# Patient Record
Sex: FEMALE | Race: WHITE | Employment: OTHER | URBAN - METROPOLITAN AREA
[De-identification: names, ages, dates, MRNs, and addresses within clinical notes are randomized per-mention and may not be internally consistent; named-entity substitution may affect disease eponyms.]

---

## 2017-11-01 ENCOUNTER — HOSPITAL ENCOUNTER (OUTPATIENT)
Dept: OTHER | Age: 82
Discharge: HOME | End: 2017-11-30
Attending: INTERNAL MEDICINE | Admitting: INTERNAL MEDICINE

## 2017-12-01 ENCOUNTER — HOSPITAL ENCOUNTER (OUTPATIENT)
Dept: OTHER | Age: 82
Discharge: HOME | End: 2017-12-31
Attending: INTERNAL MEDICINE | Admitting: INTERNAL MEDICINE

## 2018-01-01 ENCOUNTER — HOSPITAL ENCOUNTER (OUTPATIENT)
Dept: OTHER | Age: 83
Discharge: HOME | End: 2018-01-31
Attending: INTERNAL MEDICINE | Admitting: INTERNAL MEDICINE

## 2018-02-01 ENCOUNTER — HOSPITAL ENCOUNTER (OUTPATIENT)
Dept: OTHER | Age: 83
Discharge: HOME | End: 2018-02-28
Attending: INTERNAL MEDICINE | Admitting: INTERNAL MEDICINE

## 2018-03-01 ENCOUNTER — HOSPITAL ENCOUNTER (OUTPATIENT)
Dept: OTHER | Age: 83
Discharge: HOME | End: 2018-03-31
Attending: INTERNAL MEDICINE | Admitting: INTERNAL MEDICINE

## 2018-04-01 ENCOUNTER — HOSPITAL ENCOUNTER (OUTPATIENT)
Dept: OTHER | Age: 83
Discharge: HOME | End: 2018-04-30
Attending: INTERNAL MEDICINE | Admitting: INTERNAL MEDICINE

## 2018-05-01 ENCOUNTER — HOSPITAL ENCOUNTER (OUTPATIENT)
Dept: OTHER | Age: 83
Discharge: HOME | End: 2018-05-31
Attending: INTERNAL MEDICINE | Admitting: INTERNAL MEDICINE

## 2018-06-01 ENCOUNTER — HOSPITAL ENCOUNTER (OUTPATIENT)
Dept: OTHER | Age: 83
Discharge: HOME | End: 2018-06-30
Attending: INTERNAL MEDICINE | Admitting: INTERNAL MEDICINE

## 2018-07-01 ENCOUNTER — HOSPITAL ENCOUNTER (OUTPATIENT)
Dept: OTHER | Age: 83
Discharge: HOME | End: 2018-07-01
Attending: INTERNAL MEDICINE | Admitting: INTERNAL MEDICINE

## 2018-08-08 ENCOUNTER — HOSPITAL ENCOUNTER (OUTPATIENT)
Age: 83
Discharge: HOME | End: 2018-08-08

## 2018-08-08 PROCEDURE — 9900000038 HC CARDIAC REHAB PHASE 3 - MONTHLY

## 2018-09-06 ENCOUNTER — HOSPITAL ENCOUNTER (OUTPATIENT)
Age: 83
Discharge: HOME | End: 2018-09-06

## 2018-09-06 PROCEDURE — 9900000038 HC CARDIAC REHAB PHASE 3 - MONTHLY

## 2018-10-03 ENCOUNTER — HOSPITAL ENCOUNTER (OUTPATIENT)
Age: 83
Discharge: HOME | End: 2018-10-03

## 2018-10-03 PROCEDURE — 9900000038 HC CARDIAC REHAB PHASE 3 - MONTHLY

## 2018-11-06 ENCOUNTER — HOSPITAL ENCOUNTER (OUTPATIENT)
Age: 83
Discharge: HOME | End: 2018-11-06

## 2018-11-06 PROCEDURE — 9900000038 HC CARDIAC REHAB PHASE 3 - MONTHLY

## 2018-12-05 ENCOUNTER — HOSPITAL ENCOUNTER (OUTPATIENT)
Age: 83
Discharge: HOME | End: 2018-12-05

## 2018-12-05 PROCEDURE — 9900000038 HC CARDIAC REHAB PHASE 3 - MONTHLY

## 2019-01-07 ENCOUNTER — HOSPITAL ENCOUNTER (OUTPATIENT)
Age: 84
Discharge: HOME | End: 2019-01-07

## 2019-01-07 PROCEDURE — 9900000038 HC CARDIAC REHAB PHASE 3 - MONTHLY

## 2019-02-06 ENCOUNTER — HOSPITAL ENCOUNTER (OUTPATIENT)
Age: 84
Discharge: HOME | End: 2019-02-06

## 2019-02-06 PROCEDURE — 9900000038 HC CARDIAC REHAB PHASE 3 - MONTHLY

## 2019-03-05 ENCOUNTER — HOSPITAL ENCOUNTER (OUTPATIENT)
Age: 84
Discharge: HOME | End: 2019-03-05

## 2019-03-05 PROCEDURE — 9900000038 HC CARDIAC REHAB PHASE 3 - MONTHLY

## 2019-04-03 ENCOUNTER — HOSPITAL ENCOUNTER (OUTPATIENT)
Age: 84
Discharge: HOME | End: 2019-04-03
Payer: COMMERCIAL

## 2019-04-03 PROCEDURE — 9900000038 HC CARDIAC REHAB PHASE 3 - MONTHLY

## 2019-05-02 ENCOUNTER — OFFICE VISIT (OUTPATIENT)
Dept: ORTHOPEDIC SURGERY | Age: 84
End: 2019-05-02
Payer: COMMERCIAL

## 2019-05-02 VITALS — HEIGHT: 64 IN | WEIGHT: 169.97 LBS | BODY MASS INDEX: 29.02 KG/M2

## 2019-05-02 DIAGNOSIS — M17.12 PRIMARY OSTEOARTHRITIS OF LEFT KNEE: ICD-10-CM

## 2019-05-02 DIAGNOSIS — M25.562 LEFT KNEE PAIN, UNSPECIFIED CHRONICITY: Primary | ICD-10-CM

## 2019-05-02 PROCEDURE — 99214 OFFICE O/P EST MOD 30 MIN: CPT | Performed by: ORTHOPAEDIC SURGERY

## 2019-05-02 RX ORDER — LEVOTHYROXINE SODIUM 0.05 MG/1
50 TABLET ORAL DAILY
Refills: 1 | Status: ON HOLD | COMMUNITY
Start: 2019-02-27 | End: 2021-01-01 | Stop reason: HOSPADM

## 2019-05-02 NOTE — PROGRESS NOTES
Juni Garza  [facility]#-[id number]  LOT#- [id number]  WYK:64/8899    4CC XYLOCAINE  VFW#-[id number]  OWX#[id number]  EXP: 10/2022    2CC DEPO  NDC#-[id number]  ZGI#[id number]  EXP: 01/2021    SITE: LEFT KNEE

## 2019-05-02 NOTE — PROGRESS NOTES
CHIEF COMPLAINT:    Chief Complaint   Patient presents with    Knee Pain     LEFT KNEE. PT STATES NO INJURY PAIN FOR YEARS. . NOW GETTING WORSE HARD TO WALK       HISTORY OF PRESENT ILLNESS:                The patient is a 81 y.o. female presents to clinic for evaluation of left knee pain. She states she's had pain in his knee for several years however, it has recently been worsening. She reports increased pain with activity and stair climbing. She points the anterior and medial aspects of the knee as the location of pain.     Does have stage IV kidney disease    Past Medical History:   Diagnosis Date    CAD (coronary artery disease)     MI    Hyperlipidemia     Hypertension     Kidney disease     stage 3 kidney disease    Peripheral vascular disease ([facility] [address]           The pain assessment was noted & is as follows:  Pain Assessment  Location of Pain: Knee  Location Modifiers: Left  Severity of Pain: 6  Quality of Pain: Aching, Dull, Sharp  Duration of Pain: Persistent  Frequency of Pain: Constant  Aggravating Factors: Bending, Stretching, Walking, Stairs  Limiting Behavior: Some  Relieving Factors: Rest  Result of Injury: No  Work-Related Injury: No  Are there other pain locations you wish to document?: No]      Work Status/Functionality:     Past Medical History: Medical history form was reviewed today & can be found in the media tab  Past Medical History:   Diagnosis Date    CAD (coronary artery disease)     MI    Hyperlipidemia     Hypertension     Kidney disease     stage 3 kidney disease    Peripheral vascular disease ([facility] [address]       Past Surgical History:     Past Surgical History:   Procedure Laterality Date    COLONOSCOPY  2/2012    CORONARY ANGIOPLASTY WITH STENT PLACEMENT      2009    ELBOW SURGERY      left    JOINT REPLACEMENT  8/30/11     right total knee replacement    TONSILLECTOMY       Current Medications:     Current Outpatient Medications:     levothyroxine (SYNTHROID) 50 MCG tablet, Visco supplementation with hyaluronate products. We talked about the typical course of this type of treatment and the fact that often times in the treatment for significant arthritis, this is successful less than half the time. We also talked about the corticosteroid injections and the fact that this can give a brief window of relief, but does not cure the problem; in fact, the pain often has a rebound effect in 6-10 weeks after the steroid has worn off. We also discussed arthroscopy surgery in attempts to debride the joint, but the fact that this is relatively unreliable treatment in the face of significant arthritis. It can occasionally be used, particularly if there is significant meniscus pathology. Lastly we discussed total joint replacement arthroplasty as the final and definitive step in treatment of arthritis. Patient realizes the magnitude of this type of treatment as well as having voiced a general understanding to the duration of the prosthesis. The patient voiced understanding to these continuum of treatment options. She was given a cortisone injection to the left knee today and will return to clinic for symptoms do not improve with this treatment. I discussed in detail the risks, benefits and complications of an injection which included but are not limited to infection, skin reactions, hot swollen joint, and anaphylaxis with the patient. The patient verbalized understanding and gave informed consent for the injection. The patient's knee was flexed to 90° and the skin prepped using sterile alcohol solution. A sterile 22-gauge needle was inserted into the knee and the mixture of 4 mL of 2% Carbocaine, 4 mL of 0.25% Marcaine, and 2mL of 40 mg of Depo-Medrol was injected under sterile technique. The needle was withdrawn and the puncture site sealed with a Band-Aid.      Technique: Under sterile conditions a [employer] ultrasound unit with a variable frequency (6.0-15.0 MHz) linear transducer was used

## 2019-05-10 ENCOUNTER — HOSPITAL ENCOUNTER (OUTPATIENT)
Age: 84
Discharge: HOME | End: 2019-05-10
Payer: COMMERCIAL

## 2019-05-10 PROCEDURE — 9900000038 HC CARDIAC REHAB PHASE 3 - MONTHLY

## 2019-05-23 ENCOUNTER — HOSPITAL ENCOUNTER (OUTPATIENT)
Dept: VASCULAR LAB | Age: 84
Discharge: HOME | End: 2019-05-23
Payer: COMMERCIAL

## 2019-05-23 PROCEDURE — 93922 UPR/L XTREMITY ART 2 LEVELS: CPT

## 2019-06-03 PROCEDURE — 9900000038 HC CARDIAC REHAB PHASE 3 - MONTHLY

## 2019-06-24 ENCOUNTER — HOSPITAL ENCOUNTER (OUTPATIENT)
Dept: CARDIAC REHAB | Age: 84
Discharge: HOME | End: 2019-06-24
Payer: COMMERCIAL

## 2019-07-12 PROCEDURE — 9900000038 HC CARDIAC REHAB PHASE 3 - MONTHLY

## 2019-07-29 ENCOUNTER — HOSPITAL ENCOUNTER (OUTPATIENT)
Dept: CARDIAC REHAB | Age: 84
Discharge: HOME | End: 2019-07-29
Payer: COMMERCIAL

## 2019-08-06 PROCEDURE — 9900000038 HC CARDIAC REHAB PHASE 3 - MONTHLY

## 2019-08-26 ENCOUNTER — HOSPITAL ENCOUNTER (OUTPATIENT)
Dept: CARDIAC REHAB | Age: 84
Discharge: HOME | End: 2019-08-26
Payer: COMMERCIAL

## 2019-09-11 PROCEDURE — 9900000038 HC CARDIAC REHAB PHASE 3 - MONTHLY

## 2019-09-23 ENCOUNTER — OFFICE VISIT (OUTPATIENT)
Dept: ORTHOPEDIC SURGERY | Age: 84
End: 2019-09-23
Payer: COMMERCIAL

## 2019-09-23 DIAGNOSIS — M25.562 LEFT KNEE PAIN, UNSPECIFIED CHRONICITY: Primary | ICD-10-CM

## 2019-09-23 RX ORDER — METHYLPREDNISOLONE ACETATE 40 MG/ML
80 INJECTION, SUSPENSION INTRA-ARTICULAR; INTRALESIONAL; INTRAMUSCULAR; SOFT TISSUE ONCE
Status: COMPLETED | OUTPATIENT
Start: 2019-09-23 | End: 2019-09-23

## 2019-09-23 RX ADMIN — METHYLPREDNISOLONE ACETATE 80 MG: 40 INJECTION, SUSPENSION INTRA-ARTICULAR; INTRALESIONAL; INTRAMUSCULAR; SOFT TISSUE at 10:18

## 2019-09-23 NOTE — PROGRESS NOTES
LEFT knee cortisone injection visit only  I discussed in detail the risks, benefits and complications of an injection which included but are not limited to infection, skin reactions, hot swollen joint, and anaphylaxis with the patient. The patient verbalized understanding and gave informed consent for the injection. The patient's knee was flexed to 90° and the skin prepped using sterile alcohol solution. A sterile 22-gauge needle was inserted into the knee and the mixture of 4 mL of 2% Carbocaine, 4 mL of 0.25% Marcaine, and 80 mg of Depo-Medrol was injected under sterile technique. The needle was withdrawn and the puncture site sealed with a Band-Aid. Technique: Under sterile conditions a [employer] ultrasound unit with a variable frequency (6.0-15.0 MHz) linear transducer was used to localize the placement of a 22-gauge needle into the LEFT knee joint. Findings: Successful needle placement for knee injection. Final images were taken and saved for permanent record. The patient tolerated the injection well. The patient was instructed to call the office immediately if there is any pain, redness, warmth, fever, or chills.

## 2019-09-30 ENCOUNTER — HOSPITAL ENCOUNTER (OUTPATIENT)
Dept: CARDIAC REHAB | Age: 84
Discharge: HOME | End: 2019-09-30
Payer: COMMERCIAL

## 2019-10-02 PROCEDURE — 9900000038 HC CARDIAC REHAB PHASE 3 - MONTHLY

## 2019-10-28 ENCOUNTER — HOSPITAL ENCOUNTER (OUTPATIENT)
Dept: CARDIAC REHAB | Age: 84
Discharge: HOME | End: 2019-10-28
Payer: COMMERCIAL

## 2019-11-05 PROCEDURE — 9900000038 HC CARDIAC REHAB PHASE 3 - MONTHLY

## 2019-11-25 ENCOUNTER — HOSPITAL ENCOUNTER (OUTPATIENT)
Dept: CARDIAC REHAB | Age: 84
Discharge: HOME | End: 2019-11-25
Payer: COMMERCIAL

## 2019-12-02 PROCEDURE — 9900000038 HC CARDIAC REHAB PHASE 3 - MONTHLY

## 2019-12-23 ENCOUNTER — OFFICE VISIT (OUTPATIENT)
Dept: ORTHOPEDIC SURGERY | Age: 84
End: 2019-12-23
Payer: COMMERCIAL

## 2019-12-23 DIAGNOSIS — M17.12 PRIMARY OSTEOARTHRITIS OF LEFT KNEE: Primary | ICD-10-CM

## 2019-12-23 RX ORDER — METHYLPREDNISOLONE ACETATE 40 MG/ML
80 INJECTION, SUSPENSION INTRA-ARTICULAR; INTRALESIONAL; INTRAMUSCULAR; SOFT TISSUE ONCE
Status: COMPLETED | OUTPATIENT
Start: 2019-12-23 | End: 2019-12-23

## 2019-12-23 RX ADMIN — METHYLPREDNISOLONE ACETATE 80 MG: 40 INJECTION, SUSPENSION INTRA-ARTICULAR; INTRALESIONAL; INTRAMUSCULAR; SOFT TISSUE at 10:23

## 2019-12-30 ENCOUNTER — HOSPITAL ENCOUNTER (OUTPATIENT)
Dept: CARDIAC REHAB | Age: 84
Discharge: HOME | End: 2019-12-30
Payer: COMMERCIAL

## 2020-01-08 PROCEDURE — 9900000038 HC CARDIAC REHAB PHASE 3 - MONTHLY

## 2020-01-27 ENCOUNTER — HOSPITAL ENCOUNTER (OUTPATIENT)
Dept: CARDIAC REHAB | Age: 85
Discharge: HOME | End: 2020-01-27
Payer: COMMERCIAL

## 2020-02-11 PROCEDURE — 9900000038 HC CARDIAC REHAB PHASE 3 - MONTHLY

## 2020-02-24 ENCOUNTER — HOSPITAL ENCOUNTER (OUTPATIENT)
Dept: CARDIAC REHAB | Age: 85
Discharge: HOME | End: 2020-02-24
Payer: COMMERCIAL

## 2020-03-04 ENCOUNTER — OFFICE VISIT (OUTPATIENT)
Dept: ORTHOPEDIC SURGERY | Age: 85
End: 2020-03-04
Payer: COMMERCIAL

## 2020-03-04 VITALS — HEIGHT: 64 IN | WEIGHT: 169.97 LBS | BODY MASS INDEX: 29.02 KG/M2

## 2020-03-04 PROBLEM — M70.41 PREPATELLAR BURSITIS OF RIGHT KNEE: Status: ACTIVE | Noted: 2020-03-04

## 2020-03-04 PROCEDURE — 99214 OFFICE O/P EST MOD 30 MIN: CPT | Performed by: ORTHOPAEDIC SURGERY

## 2020-03-04 RX ORDER — CEPHALEXIN 500 MG/1
500 CAPSULE ORAL 3 TIMES DAILY
Qty: 30 CAPSULE | Refills: 0 | Status: SHIPPED | OUTPATIENT
Start: 2020-03-04 | End: 2020-03-14

## 2020-03-04 RX ORDER — SULFAMETHOXAZOLE AND TRIMETHOPRIM 800; 160 MG/1; MG/1
1 TABLET ORAL 2 TIMES DAILY
Qty: 20 TABLET | Refills: 0 | Status: CANCELLED | OUTPATIENT
Start: 2020-03-04 | End: 2020-03-14

## 2020-03-04 NOTE — PROGRESS NOTES
SURGERY      left    JOINT REPLACEMENT  8/30/11     right total knee replacement    TONSILLECTOMY       Current Medications:     Current Outpatient Medications:     cephALEXin (KEFLEX) 500 MG capsule, Take 1 capsule by mouth 3 times daily for 10 days, Disp: 30 capsule, Rfl: 0    levothyroxine (SYNTHROID) 50 MCG tablet, TK 1 T PO D, Disp: , Rfl: 1    therapeutic multivitamin-minerals (THERAGRAN-M) tablet, Take 1 tablet by mouth daily. , Disp: , Rfl:     fenofibrate (TRIGLIDE) 160 MG tablet, Take 160 mg by mouth daily. , Disp: , Rfl:     amlodipine (NORVASC) 5 MG tablet, Take 5 mg by mouth 2 times daily. , Disp: , Rfl:     metoprolol (LOPRESSOR) 25 MG tablet, Take 25 mg by mouth 2 times daily. , Disp: , Rfl:     lisinopril-hydrochlorothiazide (PRINZIDE;ZESTORETIC) 20-12.5 MG per tablet, Take 1 tablet by mouth daily. , Disp: , Rfl:     atorvastatin (LIPITOR) 40 MG tablet, Take 40 mg by mouth nightly.  , Disp: , Rfl:     aspirin 81 MG chewable tablet, Take 81 mg by mouth daily. , Disp: , Rfl:     Cholecalciferol (VITAMIN D3) 2000 UNIT CAPS, Take 2,000 Units by mouth daily. , Disp: , Rfl:   Allergies:  Patient has no known allergies. Social History:    reports that she has never smoked. She has never used smokeless tobacco. She reports current alcohol use. She reports that she does not use drugs. Family History:   Family History   Problem Relation Age of Onset    Heart Disease Mother     Cancer Mother         ovarian    Cancer Father         lulu       REVIEW OF SYSTEMS:   For new problems, a full review of systems will be found scanned in the patient's chart. CONSTITUTIONAL: Denies unexplained weight loss, fevers, chills   NEUROLOGICAL: Denies unsteady gait or progressive weakness  SKIN: Denies skin changes, delayed healing, rash, itching       PHYSICAL EXAM:    Vitals: Height 5' 4.02\" (1.626 m), weight 169 lb 15.6 oz (77.1 kg).     GENERAL EXAM:  · General Apparence: Patient is adequately

## 2020-03-04 NOTE — PATIENT INSTRUCTIONS
chair, counter, or wall. 2. Standing on the leg with your affected ankle, keep that knee straight. Try to balance on that leg for up to 30 seconds. Then rest for up to 10 seconds. 3. Repeat 6 to 8 times. 4. When you can balance on your affected leg for 30 seconds with your eyes open, try to balance on it with your eyes closed. 5. When you can do this exercise with your eyes closed for 30 seconds and with ease and no pain, try standing on a pillow or piece of foam, and repeat steps 1 through 4. Follow-up care is a key part of your treatment and safety. Be sure to make and go to all appointments, and call your doctor if you are having problems. It's also a good idea to know your test results and keep a list of the medicines you take. Where can you learn more? Go to [email or web address] and sign in to your [employer] account. Enter Amarilys Heath in the [facility] box to learn more about \"Ankle Sprain: Rehab Exercises. \"     If you do not have an account, please click on the \"Sign Up Now\" link. Current as of: June 26, 2019  Content Version: 12.3  © 2096-3401 [employer]. Care instructions adapted under license by [facility] ([facility]). If you have questions about a medical condition or this instruction, always ask your healthcare professional. [place] [address] any warranty or liability for your use of this information.

## 2020-05-11 ENCOUNTER — NURSE ONLY (OUTPATIENT)
Dept: ORTHOPEDIC SURGERY | Age: 85
End: 2020-05-11
Payer: COMMERCIAL

## 2020-05-11 RX ORDER — BUPIVACAINE HYDROCHLORIDE 2.5 MG/ML
30 INJECTION, SOLUTION INFILTRATION; PERINEURAL ONCE
Status: COMPLETED | OUTPATIENT
Start: 2020-05-11 | End: 2020-05-11

## 2020-05-11 RX ORDER — METHYLPREDNISOLONE ACETATE 40 MG/ML
80 INJECTION, SUSPENSION INTRA-ARTICULAR; INTRALESIONAL; INTRAMUSCULAR; SOFT TISSUE ONCE
Status: COMPLETED | OUTPATIENT
Start: 2020-05-11 | End: 2020-05-11

## 2020-05-11 RX ORDER — LIDOCAINE HYDROCHLORIDE 10 MG/ML
20 INJECTION, SOLUTION INFILTRATION; PERINEURAL ONCE
Status: COMPLETED | OUTPATIENT
Start: 2020-05-11 | End: 2020-05-11

## 2020-05-11 RX ADMIN — LIDOCAINE HYDROCHLORIDE 20 ML: 10 INJECTION, SOLUTION INFILTRATION; PERINEURAL at 10:09

## 2020-05-11 RX ADMIN — METHYLPREDNISOLONE ACETATE 80 MG: 40 INJECTION, SUSPENSION INTRA-ARTICULAR; INTRALESIONAL; INTRAMUSCULAR; SOFT TISSUE at 10:09

## 2020-05-11 RX ADMIN — BUPIVACAINE HYDROCHLORIDE 75 MG: 2.5 INJECTION, SOLUTION INFILTRATION; PERINEURAL at 10:09

## 2021-01-01 ENCOUNTER — ANESTHESIA (OUTPATIENT)
Dept: OPERATING ROOM | Age: 86
DRG: 470 | End: 2021-01-01
Payer: MEDICARE

## 2021-01-01 ENCOUNTER — CARE COORDINATION (OUTPATIENT)
Dept: CASE MANAGEMENT | Age: 86
End: 2021-01-01

## 2021-01-01 ENCOUNTER — HOSPITAL ENCOUNTER (OUTPATIENT)
Age: 86
Discharge: HOME | End: 2021-06-24
Payer: COMMERCIAL

## 2021-01-01 ENCOUNTER — HOSPITAL ENCOUNTER (INPATIENT)
Age: 86
LOS: 2 days | Discharge: NURSING, REHAB OR LONG-TERM CARE | DRG: 470 | End: 2021-07-16
Attending: ORTHOPAEDIC SURGERY | Admitting: ORTHOPAEDIC SURGERY
Payer: MEDICARE

## 2021-01-01 ENCOUNTER — HOSPITAL ENCOUNTER (OUTPATIENT)
Age: 86
Setting detail: OBSERVATION
Discharge: ANOTHER HOSPITAL | End: 2021-08-17
Attending: EMERGENCY MEDICINE | Admitting: INTERNAL MEDICINE
Payer: COMMERCIAL

## 2021-01-01 ENCOUNTER — OFFICE VISIT (OUTPATIENT)
Dept: ORTHOPEDIC SURGERY | Age: 86
End: 2021-01-01
Payer: COMMERCIAL

## 2021-01-01 ENCOUNTER — HOSPITAL ENCOUNTER (OUTPATIENT)
Dept: CT IMAGING | Age: 86
Discharge: HOME | DRG: 470 | End: 2021-07-07
Payer: MEDICARE

## 2021-01-01 ENCOUNTER — APPOINTMENT (OUTPATIENT)
Dept: CT IMAGING | Age: 86
DRG: 871 | End: 2021-01-01
Payer: MEDICARE

## 2021-01-01 ENCOUNTER — TELEPHONE (OUTPATIENT)
Dept: ORTHOPEDIC SURGERY | Age: 86
End: 2021-01-01

## 2021-01-01 ENCOUNTER — HOSPITAL ENCOUNTER (INPATIENT)
Age: 86
LOS: 4 days | DRG: 871 | End: 2021-10-15
Attending: EMERGENCY MEDICINE | Admitting: INTERNAL MEDICINE
Payer: MEDICARE

## 2021-01-01 ENCOUNTER — APPOINTMENT (OUTPATIENT)
Dept: GENERAL RADIOLOGY | Age: 86
DRG: 470 | End: 2021-01-01
Attending: ORTHOPAEDIC SURGERY
Payer: MEDICARE

## 2021-01-01 ENCOUNTER — HOSPITAL ENCOUNTER (OUTPATIENT)
Dept: CT IMAGING | Age: 86
Discharge: HOME | End: 2021-06-04
Payer: COMMERCIAL

## 2021-01-01 ENCOUNTER — OFFICE VISIT (OUTPATIENT)
Dept: ORTHOPEDIC SURGERY | Age: 86
End: 2021-01-01

## 2021-01-01 ENCOUNTER — TELEPHONE (OUTPATIENT)
Dept: OBGYN CLINIC | Age: 86
End: 2021-01-01

## 2021-01-01 ENCOUNTER — APPOINTMENT (OUTPATIENT)
Dept: GENERAL RADIOLOGY | Age: 86
DRG: 871 | End: 2021-01-01
Payer: MEDICARE

## 2021-01-01 ENCOUNTER — ANESTHESIA EVENT (OUTPATIENT)
Dept: OPERATING ROOM | Age: 86
DRG: 470 | End: 2021-01-01
Payer: MEDICARE

## 2021-01-01 VITALS
RESPIRATION RATE: 16 BRPM | WEIGHT: 169 LBS | HEIGHT: 64 IN | OXYGEN SATURATION: 94 % | SYSTOLIC BLOOD PRESSURE: 164 MMHG | BODY MASS INDEX: 28.85 KG/M2 | DIASTOLIC BLOOD PRESSURE: 76 MMHG | TEMPERATURE: 98.3 F | HEART RATE: 91 BPM

## 2021-01-01 VITALS
RESPIRATION RATE: 16 BRPM | SYSTOLIC BLOOD PRESSURE: 106 MMHG | DIASTOLIC BLOOD PRESSURE: 54 MMHG | OXYGEN SATURATION: 100 %

## 2021-01-01 VITALS — BODY MASS INDEX: 28.85 KG/M2 | HEIGHT: 64 IN | WEIGHT: 169 LBS

## 2021-01-01 VITALS
HEIGHT: 64 IN | HEART RATE: 77 BPM | SYSTOLIC BLOOD PRESSURE: 152 MMHG | TEMPERATURE: 97.7 F | WEIGHT: 169 LBS | DIASTOLIC BLOOD PRESSURE: 69 MMHG | OXYGEN SATURATION: 97 % | RESPIRATION RATE: 19 BRPM | BODY MASS INDEX: 28.85 KG/M2

## 2021-01-01 VITALS — HEIGHT: 64 IN | WEIGHT: 169 LBS | BODY MASS INDEX: 28.85 KG/M2

## 2021-01-01 VITALS
RESPIRATION RATE: 16 BRPM | BODY MASS INDEX: 23.94 KG/M2 | WEIGHT: 140.21 LBS | HEART RATE: 97 BPM | SYSTOLIC BLOOD PRESSURE: 109 MMHG | HEIGHT: 64 IN | TEMPERATURE: 97.9 F | DIASTOLIC BLOOD PRESSURE: 74 MMHG | OXYGEN SATURATION: 94 %

## 2021-01-01 DIAGNOSIS — I21.4 NSTEMI (NON-ST ELEVATED MYOCARDIAL INFARCTION) (HCC): Primary | ICD-10-CM

## 2021-01-01 DIAGNOSIS — N18.30 STAGE 3 CHRONIC KIDNEY DISEASE, UNSPECIFIED WHETHER STAGE 3A OR 3B CKD (HCC): ICD-10-CM

## 2021-01-01 DIAGNOSIS — R19.07 GENERALIZED ABDOMINAL MASS: ICD-10-CM

## 2021-01-01 DIAGNOSIS — E11.00 HYPEROSMOLAR HYPERGLYCEMIC STATE (HHS) (HCC): ICD-10-CM

## 2021-01-01 DIAGNOSIS — R33.9 URINARY RETENTION: ICD-10-CM

## 2021-01-01 DIAGNOSIS — M25.562 LEFT KNEE PAIN, UNSPECIFIED CHRONICITY: ICD-10-CM

## 2021-01-01 DIAGNOSIS — Z96.652 STATUS POST TOTAL KNEE REPLACEMENT, LEFT: ICD-10-CM

## 2021-01-01 DIAGNOSIS — E87.5 HYPERKALEMIA: ICD-10-CM

## 2021-01-01 DIAGNOSIS — R19.00 ABDOMINAL MASS, UNSPECIFIED ABDOMINAL LOCATION: ICD-10-CM

## 2021-01-01 DIAGNOSIS — J96.01 ACUTE RESPIRATORY FAILURE WITH HYPOXIA (HCC): ICD-10-CM

## 2021-01-01 DIAGNOSIS — M17.12 LOCALIZED OSTEOARTHRITIS OF LEFT KNEE: Primary | ICD-10-CM

## 2021-01-01 DIAGNOSIS — A41.9 SEPSIS, DUE TO UNSPECIFIED ORGANISM, UNSPECIFIED WHETHER ACUTE ORGAN DYSFUNCTION PRESENT (HCC): ICD-10-CM

## 2021-01-01 DIAGNOSIS — Z96.652 S/P TKR (TOTAL KNEE REPLACEMENT), LEFT: Primary | ICD-10-CM

## 2021-01-01 DIAGNOSIS — M17.12 LOCALIZED OSTEOARTHRITIS OF LEFT KNEE: ICD-10-CM

## 2021-01-01 DIAGNOSIS — Z91.81 AT HIGH RISK FOR INJURY RELATED TO FALL: ICD-10-CM

## 2021-01-01 DIAGNOSIS — T83.021A DISPLACEMENT OF FOLEY CATHETER, INITIAL ENCOUNTER (HCC): Primary | ICD-10-CM

## 2021-01-01 DIAGNOSIS — M25.562 LEFT KNEE PAIN, UNSPECIFIED CHRONICITY: Primary | ICD-10-CM

## 2021-01-01 DIAGNOSIS — M24.662 ARTHROFIBROSIS OF KNEE JOINT, LEFT: ICD-10-CM

## 2021-01-01 DIAGNOSIS — Z95.5 HISTORY OF HEART ARTERY STENT: ICD-10-CM

## 2021-01-01 DIAGNOSIS — J18.9 MULTIFOCAL PNEUMONIA: ICD-10-CM

## 2021-01-01 LAB
A/G RATIO: 0.6 (ref 1.1–2.2)
A/G RATIO: 0.7 (ref 1.1–2.2)
A/G RATIO: 1.1 (ref 1.1–2.2)
ABO/RH: NORMAL
ABO/RH: NORMAL
ALBUMIN SERPL-MCNC: 1.8 G/DL (ref 3.4–5)
ALBUMIN SERPL-MCNC: 1.8 G/DL (ref 3.4–5)
ALBUMIN SERPL-MCNC: 2.1 G/DL (ref 3.4–5)
ALBUMIN SERPL-MCNC: 2.3 G/DL (ref 3.4–5)
ALBUMIN SERPL-MCNC: 3.4 G/DL (ref 3.4–5)
ALBUMIN SERPL-MCNC: 4 G/DL (ref 3.4–5)
ALP BLD-CCNC: 70 U/L (ref 40–129)
ALP BLD-CCNC: 78 U/L (ref 40–129)
ALP BLD-CCNC: 80 U/L (ref 40–129)
ALP BLD-CCNC: 94 U/L (ref 40–129)
ALP BLD-CCNC: 98 U/L (ref 40–129)
ALT SERPL-CCNC: 118 U/L (ref 10–40)
ALT SERPL-CCNC: 130 U/L (ref 10–40)
ALT SERPL-CCNC: 17 U/L (ref 10–40)
ALT SERPL-CCNC: 171 U/L (ref 10–40)
ALT SERPL-CCNC: 174 U/L (ref 10–40)
ANION GAP SERPL CALCULATED.3IONS-SCNC: 10 MMOL/L (ref 3–16)
ANION GAP SERPL CALCULATED.3IONS-SCNC: 11 MMOL/L (ref 3–16)
ANION GAP SERPL CALCULATED.3IONS-SCNC: 11 MMOL/L (ref 3–16)
ANION GAP SERPL CALCULATED.3IONS-SCNC: 12 MMOL/L (ref 3–16)
ANION GAP SERPL CALCULATED.3IONS-SCNC: 12 MMOL/L (ref 3–16)
ANION GAP SERPL CALCULATED.3IONS-SCNC: 13 MMOL/L (ref 3–16)
ANION GAP SERPL CALCULATED.3IONS-SCNC: 13 MMOL/L (ref 3–16)
ANION GAP SERPL CALCULATED.3IONS-SCNC: 7 MMOL/L (ref 3–16)
ANION GAP SERPL CALCULATED.3IONS-SCNC: 8 MMOL/L (ref 3–16)
ANION GAP SERPL CALCULATED.3IONS-SCNC: 9 MMOL/L (ref 3–16)
ANTIBODY SCREEN: NORMAL
ANTIBODY SCREEN: NORMAL
APTT: 31.9 SEC (ref 24.2–36.2)
AST SERPL-CCNC: 182 U/L (ref 15–37)
AST SERPL-CCNC: 192 U/L (ref 15–37)
AST SERPL-CCNC: 33 U/L (ref 15–37)
AST SERPL-CCNC: 89 U/L (ref 15–37)
AST SERPL-CCNC: 96 U/L (ref 15–37)
BACTERIA: ABNORMAL /HPF
BACTERIA: ABNORMAL /HPF
BASE EXCESS VENOUS: -0.5 MMOL/L (ref -3–3)
BASOPHILS ABSOLUTE: 0 K/UL (ref 0–0.2)
BASOPHILS ABSOLUTE: 0.1 K/UL (ref 0–0.2)
BASOPHILS ABSOLUTE: 0.1 K/UL (ref 0–0.2)
BASOPHILS RELATIVE PERCENT: 0.1 %
BASOPHILS RELATIVE PERCENT: 0.2 %
BASOPHILS RELATIVE PERCENT: 0.3 %
BASOPHILS RELATIVE PERCENT: 0.6 %
BASOPHILS RELATIVE PERCENT: 0.7 %
BASOPHILS RELATIVE PERCENT: 1.1 %
BILIRUB SERPL-MCNC: 0.7 MG/DL (ref 0–1)
BILIRUB SERPL-MCNC: 0.8 MG/DL (ref 0–1)
BILIRUB SERPL-MCNC: 0.8 MG/DL (ref 0–1)
BILIRUB SERPL-MCNC: 0.9 MG/DL (ref 0–1)
BILIRUB SERPL-MCNC: 1 MG/DL (ref 0–1)
BILIRUBIN URINE: NEGATIVE
BLOOD BANK DISPENSE STATUS: NORMAL
BLOOD BANK PRODUCT CODE: NORMAL
BLOOD CULTURE, ROUTINE: ABNORMAL
BLOOD, URINE: ABNORMAL
BLOOD, URINE: NEGATIVE
BLOOD, URINE: NEGATIVE
BPU ID: NORMAL
BUN BLDV-MCNC: 109 MG/DL (ref 7–20)
BUN BLDV-MCNC: 112 MG/DL (ref 7–20)
BUN BLDV-MCNC: 117 MG/DL (ref 7–20)
BUN BLDV-MCNC: 118 MG/DL (ref 7–20)
BUN BLDV-MCNC: 118 MG/DL (ref 7–20)
BUN BLDV-MCNC: 119 MG/DL (ref 7–20)
BUN BLDV-MCNC: 121 MG/DL (ref 7–20)
BUN BLDV-MCNC: 124 MG/DL (ref 7–20)
BUN BLDV-MCNC: 129 MG/DL (ref 7–20)
BUN BLDV-MCNC: 129 MG/DL (ref 7–20)
BUN BLDV-MCNC: 42 MG/DL (ref 7–20)
BUN BLDV-MCNC: 42 MG/DL (ref 7–20)
BUN BLDV-MCNC: 44 MG/DL (ref 7–20)
BUN BLDV-MCNC: 50 MG/DL (ref 7–20)
BUN BLDV-MCNC: 54 MG/DL (ref 7–20)
BUN BLDV-MCNC: 56 MG/DL (ref 7–20)
BUN BLDV-MCNC: 57 MG/DL (ref 7–20)
BUN BLDV-MCNC: 60 MG/DL (ref 7–20)
CA 125: 256.2 U/ML (ref 0–35)
CA 19-9: 23 U/ML (ref 0–35)
CALCIUM SERPL-MCNC: 10.3 MG/DL (ref 8.3–10.6)
CALCIUM SERPL-MCNC: 10.4 MG/DL (ref 8.3–10.6)
CALCIUM SERPL-MCNC: 8.6 MG/DL (ref 8.3–10.6)
CALCIUM SERPL-MCNC: 8.6 MG/DL (ref 8.3–10.6)
CALCIUM SERPL-MCNC: 8.7 MG/DL (ref 8.3–10.6)
CALCIUM SERPL-MCNC: 8.7 MG/DL (ref 8.3–10.6)
CALCIUM SERPL-MCNC: 8.8 MG/DL (ref 8.3–10.6)
CALCIUM SERPL-MCNC: 8.9 MG/DL (ref 8.3–10.6)
CALCIUM SERPL-MCNC: 8.9 MG/DL (ref 8.3–10.6)
CALCIUM SERPL-MCNC: 9 MG/DL (ref 8.3–10.6)
CALCIUM SERPL-MCNC: 9.1 MG/DL (ref 8.3–10.6)
CALCIUM SERPL-MCNC: 9.2 MG/DL (ref 8.3–10.6)
CALCIUM SERPL-MCNC: 9.5 MG/DL (ref 8.3–10.6)
CALCIUM SERPL-MCNC: 9.9 MG/DL (ref 8.3–10.6)
CARBOXYHEMOGLOBIN: 2.4 % (ref 0–1.5)
CEA: 3.8 NG/ML (ref 0–5)
CHLORIDE BLD-SCNC: 101 MMOL/L (ref 99–110)
CHLORIDE BLD-SCNC: 102 MMOL/L (ref 99–110)
CHLORIDE BLD-SCNC: 102 MMOL/L (ref 99–110)
CHLORIDE BLD-SCNC: 103 MMOL/L (ref 99–110)
CHLORIDE BLD-SCNC: 104 MMOL/L (ref 99–110)
CHLORIDE BLD-SCNC: 107 MMOL/L (ref 99–110)
CHLORIDE BLD-SCNC: 88 MMOL/L (ref 99–110)
CHLORIDE BLD-SCNC: 90 MMOL/L (ref 99–110)
CHLORIDE BLD-SCNC: 93 MMOL/L (ref 99–110)
CHLORIDE BLD-SCNC: 94 MMOL/L (ref 99–110)
CHLORIDE BLD-SCNC: 94 MMOL/L (ref 99–110)
CHLORIDE BLD-SCNC: 95 MMOL/L (ref 99–110)
CHLORIDE BLD-SCNC: 96 MMOL/L (ref 99–110)
CHLORIDE BLD-SCNC: 97 MMOL/L (ref 99–110)
CLARITY: CLEAR
CO2: 19 MMOL/L (ref 21–32)
CO2: 21 MMOL/L (ref 21–32)
CO2: 22 MMOL/L (ref 21–32)
CO2: 23 MMOL/L (ref 21–32)
CO2: 24 MMOL/L (ref 21–32)
CO2: 24 MMOL/L (ref 21–32)
CO2: 25 MMOL/L (ref 21–32)
CO2: 25 MMOL/L (ref 21–32)
COLOR: YELLOW
CREAT SERPL-MCNC: 1 MG/DL (ref 0.6–1.2)
CREAT SERPL-MCNC: 1.1 MG/DL (ref 0.6–1.2)
CREAT SERPL-MCNC: 1.1 MG/DL (ref 0.6–1.2)
CREAT SERPL-MCNC: 1.2 MG/DL (ref 0.6–1.2)
CREAT SERPL-MCNC: 1.3 MG/DL (ref 0.6–1.2)
CREAT SERPL-MCNC: 1.4 MG/DL (ref 0.6–1.2)
CREAT SERPL-MCNC: 1.5 MG/DL (ref 0.6–1.2)
CREAT SERPL-MCNC: 1.6 MG/DL (ref 0.6–1.2)
CREAT SERPL-MCNC: 1.6 MG/DL (ref 0.6–1.2)
CREAT SERPL-MCNC: 1.8 MG/DL (ref 0.6–1.2)
CREAT SERPL-MCNC: 1.8 MG/DL (ref 0.6–1.2)
CULTURE, BLOOD 2: ABNORMAL
CULTURE, BLOOD 2: ABNORMAL
DESCRIPTION BLOOD BANK: NORMAL
EKG ATRIAL RATE: 78 BPM
EKG ATRIAL RATE: 92 BPM
EKG ATRIAL RATE: 93 BPM
EKG DIAGNOSIS: NORMAL
EKG Q-T INTERVAL: 352 MS
EKG Q-T INTERVAL: 360 MS
EKG Q-T INTERVAL: 362 MS
EKG QRS DURATION: 114 MS
EKG QRS DURATION: 122 MS
EKG QRS DURATION: 92 MS
EKG QTC CALCULATION (BAZETT): 435 MS
EKG QTC CALCULATION (BAZETT): 452 MS
EKG QTC CALCULATION (BAZETT): 464 MS
EKG R AXIS: 36 DEGREES
EKG R AXIS: 54 DEGREES
EKG R AXIS: 71 DEGREES
EKG T AXIS: -87 DEGREES
EKG T AXIS: 261 DEGREES
EKG T AXIS: 87 DEGREES
EKG VENTRICULAR RATE: 100 BPM
EKG VENTRICULAR RATE: 92 BPM
EKG VENTRICULAR RATE: 94 BPM
EOSINOPHILS ABSOLUTE: 0 K/UL (ref 0–0.6)
EOSINOPHILS ABSOLUTE: 0.1 K/UL (ref 0–0.6)
EOSINOPHILS ABSOLUTE: 0.2 K/UL (ref 0–0.6)
EOSINOPHILS ABSOLUTE: 0.2 K/UL (ref 0–0.6)
EOSINOPHILS RELATIVE PERCENT: 0 %
EOSINOPHILS RELATIVE PERCENT: 0.1 %
EOSINOPHILS RELATIVE PERCENT: 0.1 %
EOSINOPHILS RELATIVE PERCENT: 0.3 %
EOSINOPHILS RELATIVE PERCENT: 0.6 %
EOSINOPHILS RELATIVE PERCENT: 0.9 %
EOSINOPHILS RELATIVE PERCENT: 1 %
EOSINOPHILS RELATIVE PERCENT: 2.3 %
EOSINOPHILS RELATIVE PERCENT: 2.7 %
EPITHELIAL CELLS, UA: ABNORMAL /HPF (ref 0–5)
ESTIMATED AVERAGE GLUCOSE: 119.8 MG/DL
ESTIMATED AVERAGE GLUCOSE: 194.4 MG/DL
ESTIMATED AVERAGE GLUCOSE: 200.1 MG/DL
GFR AFRICAN AMERICAN: 32
GFR AFRICAN AMERICAN: 32
GFR AFRICAN AMERICAN: 37
GFR AFRICAN AMERICAN: 37
GFR AFRICAN AMERICAN: 40
GFR AFRICAN AMERICAN: 43
GFR AFRICAN AMERICAN: 47
GFR AFRICAN AMERICAN: 52
GFR AFRICAN AMERICAN: 57
GFR AFRICAN AMERICAN: 57
GFR AFRICAN AMERICAN: >60
GFR NON-AFRICAN AMERICAN: 27
GFR NON-AFRICAN AMERICAN: 27
GFR NON-AFRICAN AMERICAN: 31
GFR NON-AFRICAN AMERICAN: 31
GFR NON-AFRICAN AMERICAN: 33
GFR NON-AFRICAN AMERICAN: 36
GFR NON-AFRICAN AMERICAN: 39
GFR NON-AFRICAN AMERICAN: 43
GFR NON-AFRICAN AMERICAN: 47
GFR NON-AFRICAN AMERICAN: 47
GFR NON-AFRICAN AMERICAN: 53
GLOBULIN: 3.1 G/DL
GLOBULIN: 3.2 G/DL
GLOBULIN: 3.2 G/DL
GLOBULIN: 3.4 G/DL
GLOBULIN: 3.4 G/DL
GLUCOSE BLD-MCNC: 105 MG/DL (ref 70–99)
GLUCOSE BLD-MCNC: 109 MG/DL (ref 70–99)
GLUCOSE BLD-MCNC: 110 MG/DL (ref 70–99)
GLUCOSE BLD-MCNC: 113 MG/DL (ref 70–99)
GLUCOSE BLD-MCNC: 120 MG/DL (ref 70–99)
GLUCOSE BLD-MCNC: 121 MG/DL (ref 70–99)
GLUCOSE BLD-MCNC: 124 MG/DL (ref 70–99)
GLUCOSE BLD-MCNC: 124 MG/DL (ref 70–99)
GLUCOSE BLD-MCNC: 126 MG/DL (ref 70–99)
GLUCOSE BLD-MCNC: 126 MG/DL (ref 70–99)
GLUCOSE BLD-MCNC: 127 MG/DL (ref 70–99)
GLUCOSE BLD-MCNC: 128 MG/DL (ref 70–99)
GLUCOSE BLD-MCNC: 128 MG/DL (ref 70–99)
GLUCOSE BLD-MCNC: 129 MG/DL (ref 70–99)
GLUCOSE BLD-MCNC: 131 MG/DL (ref 70–99)
GLUCOSE BLD-MCNC: 133 MG/DL (ref 70–99)
GLUCOSE BLD-MCNC: 133 MG/DL (ref 70–99)
GLUCOSE BLD-MCNC: 136 MG/DL (ref 70–99)
GLUCOSE BLD-MCNC: 137 MG/DL (ref 70–99)
GLUCOSE BLD-MCNC: 139 MG/DL (ref 70–99)
GLUCOSE BLD-MCNC: 142 MG/DL (ref 70–99)
GLUCOSE BLD-MCNC: 149 MG/DL (ref 70–99)
GLUCOSE BLD-MCNC: 149 MG/DL (ref 70–99)
GLUCOSE BLD-MCNC: 150 MG/DL (ref 70–99)
GLUCOSE BLD-MCNC: 150 MG/DL (ref 70–99)
GLUCOSE BLD-MCNC: 151 MG/DL (ref 70–99)
GLUCOSE BLD-MCNC: 153 MG/DL (ref 70–99)
GLUCOSE BLD-MCNC: 158 MG/DL (ref 70–99)
GLUCOSE BLD-MCNC: 159 MG/DL (ref 70–99)
GLUCOSE BLD-MCNC: 167 MG/DL (ref 70–99)
GLUCOSE BLD-MCNC: 168 MG/DL (ref 70–99)
GLUCOSE BLD-MCNC: 169 MG/DL (ref 70–99)
GLUCOSE BLD-MCNC: 174 MG/DL (ref 70–99)
GLUCOSE BLD-MCNC: 181 MG/DL (ref 70–99)
GLUCOSE BLD-MCNC: 181 MG/DL (ref 70–99)
GLUCOSE BLD-MCNC: 183 MG/DL (ref 70–99)
GLUCOSE BLD-MCNC: 183 MG/DL (ref 70–99)
GLUCOSE BLD-MCNC: 184 MG/DL (ref 70–99)
GLUCOSE BLD-MCNC: 192 MG/DL (ref 70–99)
GLUCOSE BLD-MCNC: 196 MG/DL (ref 70–99)
GLUCOSE BLD-MCNC: 196 MG/DL (ref 70–99)
GLUCOSE BLD-MCNC: 203 MG/DL (ref 70–99)
GLUCOSE BLD-MCNC: 213 MG/DL (ref 70–99)
GLUCOSE BLD-MCNC: 276 MG/DL (ref 70–99)
GLUCOSE BLD-MCNC: 313 MG/DL (ref 70–99)
GLUCOSE BLD-MCNC: 317 MG/DL (ref 70–99)
GLUCOSE BLD-MCNC: 321 MG/DL (ref 70–99)
GLUCOSE BLD-MCNC: 362 MG/DL (ref 70–99)
GLUCOSE BLD-MCNC: 490 MG/DL (ref 70–99)
GLUCOSE BLD-MCNC: 505 MG/DL (ref 70–99)
GLUCOSE BLD-MCNC: 554 MG/DL (ref 70–99)
GLUCOSE BLD-MCNC: 573 MG/DL (ref 70–99)
GLUCOSE BLD-MCNC: 582 MG/DL (ref 70–99)
GLUCOSE BLD-MCNC: 695 MG/DL (ref 70–99)
GLUCOSE BLD-MCNC: 729 MG/DL (ref 70–99)
GLUCOSE BLD-MCNC: 733 MG/DL (ref 70–99)
GLUCOSE BLD-MCNC: 778 MG/DL (ref 70–99)
GLUCOSE BLD-MCNC: 92 MG/DL (ref 70–99)
GLUCOSE BLD-MCNC: 94 MG/DL (ref 70–99)
GLUCOSE BLD-MCNC: >600 MG/DL (ref 70–99)
GLUCOSE BLD-MCNC: >600 MG/DL (ref 70–99)
GLUCOSE URINE: 500 MG/DL
GLUCOSE URINE: NEGATIVE MG/DL
GLUCOSE URINE: NEGATIVE MG/DL
HBA1C MFR BLD: 5.8 %
HBA1C MFR BLD: 8.4 %
HBA1C MFR BLD: 8.6 %
HCO3 VENOUS: 22.3 MMOL/L (ref 23–29)
HCT VFR BLD CALC: 20.9 % (ref 36–48)
HCT VFR BLD CALC: 24.4 % (ref 36–48)
HCT VFR BLD CALC: 26.1 % (ref 36–48)
HCT VFR BLD CALC: 26.6 % (ref 36–48)
HCT VFR BLD CALC: 26.9 % (ref 36–48)
HCT VFR BLD CALC: 27.6 % (ref 36–48)
HCT VFR BLD CALC: 28.3 % (ref 36–48)
HCT VFR BLD CALC: 28.5 % (ref 36–48)
HCT VFR BLD CALC: 28.6 % (ref 36–48)
HCT VFR BLD CALC: 31.2 % (ref 36–48)
HCT VFR BLD CALC: 31.3 % (ref 36–48)
HCT VFR BLD CALC: 34.3 % (ref 36–48)
HEMOGLOBIN: 10.2 G/DL (ref 12–16)
HEMOGLOBIN: 10.5 G/DL (ref 12–16)
HEMOGLOBIN: 11.4 G/DL (ref 12–16)
HEMOGLOBIN: 7 G/DL (ref 12–16)
HEMOGLOBIN: 8.2 G/DL (ref 12–16)
HEMOGLOBIN: 8.8 G/DL (ref 12–16)
HEMOGLOBIN: 8.8 G/DL (ref 12–16)
HEMOGLOBIN: 9.1 G/DL (ref 12–16)
HEMOGLOBIN: 9.2 G/DL (ref 12–16)
HEMOGLOBIN: 9.3 G/DL (ref 12–16)
HEMOGLOBIN: 9.4 G/DL (ref 12–16)
HEMOGLOBIN: 9.6 G/DL (ref 12–16)
HUMAN EPIDIDYMIS PROTEIN 4: 187 PMOL/L (ref 0–140)
INR BLD: 1.09 (ref 0.86–1.14)
INR BLD: 1.26 (ref 0.88–1.12)
INR BLD: 1.3 (ref 0.88–1.12)
INR BLD: 1.36 (ref 0.88–1.12)
KETONES, URINE: NEGATIVE MG/DL
LACTIC ACID: 2.6 MMOL/L (ref 0.4–2)
LACTIC ACID: 2.9 MMOL/L (ref 0.4–2)
LACTIC ACID: 3 MMOL/L (ref 0.4–2)
LACTIC ACID: 4.1 MMOL/L (ref 0.4–2)
LEUKOCYTE ESTERASE, URINE: ABNORMAL
LEUKOCYTE ESTERASE, URINE: NEGATIVE
LEUKOCYTE ESTERASE, URINE: NEGATIVE
LV EF: 58 %
LVEF MODALITY: NORMAL
LYMPHOCYTES ABSOLUTE: 0.5 K/UL (ref 1–5.1)
LYMPHOCYTES ABSOLUTE: 0.6 K/UL (ref 1–5.1)
LYMPHOCYTES ABSOLUTE: 0.7 K/UL (ref 1–5.1)
LYMPHOCYTES ABSOLUTE: 0.7 K/UL (ref 1–5.1)
LYMPHOCYTES ABSOLUTE: 0.8 K/UL (ref 1–5.1)
LYMPHOCYTES ABSOLUTE: 0.9 K/UL (ref 1–5.1)
LYMPHOCYTES ABSOLUTE: 1.2 K/UL (ref 1–5.1)
LYMPHOCYTES ABSOLUTE: 1.2 K/UL (ref 1–5.1)
LYMPHOCYTES RELATIVE PERCENT: 10 %
LYMPHOCYTES RELATIVE PERCENT: 11.5 %
LYMPHOCYTES RELATIVE PERCENT: 13.9 %
LYMPHOCYTES RELATIVE PERCENT: 3.2 %
LYMPHOCYTES RELATIVE PERCENT: 3.8 %
LYMPHOCYTES RELATIVE PERCENT: 6.2 %
LYMPHOCYTES RELATIVE PERCENT: 6.8 %
LYMPHOCYTES RELATIVE PERCENT: 7 %
LYMPHOCYTES RELATIVE PERCENT: 7.3 %
LYMPHOCYTES RELATIVE PERCENT: 7.6 %
LYMPHOCYTES RELATIVE PERCENT: 9.2 %
MAGNESIUM: 1.8 MG/DL (ref 1.8–2.4)
MAGNESIUM: 1.8 MG/DL (ref 1.8–2.4)
MAGNESIUM: 1.9 MG/DL (ref 1.8–2.4)
MAGNESIUM: 2 MG/DL (ref 1.8–2.4)
MAGNESIUM: 2.1 MG/DL (ref 1.8–2.4)
MCH RBC QN AUTO: 26.6 PG (ref 26–34)
MCH RBC QN AUTO: 26.9 PG (ref 26–34)
MCH RBC QN AUTO: 27.1 PG (ref 26–34)
MCH RBC QN AUTO: 27.3 PG (ref 26–34)
MCH RBC QN AUTO: 29 PG (ref 26–34)
MCH RBC QN AUTO: 29.1 PG (ref 26–34)
MCH RBC QN AUTO: 29.1 PG (ref 26–34)
MCH RBC QN AUTO: 29.5 PG (ref 26–34)
MCH RBC QN AUTO: 29.8 PG (ref 26–34)
MCH RBC QN AUTO: 30.5 PG (ref 26–34)
MCH RBC QN AUTO: 30.7 PG (ref 26–34)
MCHC RBC AUTO-ENTMCNC: 32.4 G/DL (ref 31–36)
MCHC RBC AUTO-ENTMCNC: 32.8 G/DL (ref 31–36)
MCHC RBC AUTO-ENTMCNC: 32.9 G/DL (ref 31–36)
MCHC RBC AUTO-ENTMCNC: 33.1 G/DL (ref 31–36)
MCHC RBC AUTO-ENTMCNC: 33.1 G/DL (ref 31–36)
MCHC RBC AUTO-ENTMCNC: 33.5 G/DL (ref 31–36)
MCHC RBC AUTO-ENTMCNC: 33.5 G/DL (ref 31–36)
MCHC RBC AUTO-ENTMCNC: 33.7 G/DL (ref 31–36)
MCHC RBC AUTO-ENTMCNC: 33.7 G/DL (ref 31–36)
MCHC RBC AUTO-ENTMCNC: 33.9 G/DL (ref 31–36)
MCHC RBC AUTO-ENTMCNC: 33.9 G/DL (ref 31–36)
MCV RBC AUTO: 80.9 FL (ref 80–100)
MCV RBC AUTO: 81.9 FL (ref 80–100)
MCV RBC AUTO: 82 FL (ref 80–100)
MCV RBC AUTO: 84.4 FL (ref 80–100)
MCV RBC AUTO: 86.1 FL (ref 80–100)
MCV RBC AUTO: 86.2 FL (ref 80–100)
MCV RBC AUTO: 86.9 FL (ref 80–100)
MCV RBC AUTO: 87.1 FL (ref 80–100)
MCV RBC AUTO: 88.1 FL (ref 80–100)
MCV RBC AUTO: 91.1 FL (ref 80–100)
MCV RBC AUTO: 92.8 FL (ref 80–100)
METHEMOGLOBIN VENOUS: 0.6 %
MICROSCOPIC EXAMINATION: NORMAL
MICROSCOPIC EXAMINATION: YES
MICROSCOPIC EXAMINATION: YES
MONOCYTES ABSOLUTE: 0.5 K/UL (ref 0–1.3)
MONOCYTES ABSOLUTE: 0.6 K/UL (ref 0–1.3)
MONOCYTES ABSOLUTE: 0.7 K/UL (ref 0–1.3)
MONOCYTES ABSOLUTE: 1 K/UL (ref 0–1.3)
MONOCYTES ABSOLUTE: 1.1 K/UL (ref 0–1.3)
MONOCYTES ABSOLUTE: 1.2 K/UL (ref 0–1.3)
MONOCYTES ABSOLUTE: 1.2 K/UL (ref 0–1.3)
MONOCYTES RELATIVE PERCENT: 10 %
MONOCYTES RELATIVE PERCENT: 10.3 %
MONOCYTES RELATIVE PERCENT: 10.3 %
MONOCYTES RELATIVE PERCENT: 10.5 %
MONOCYTES RELATIVE PERCENT: 3.1 %
MONOCYTES RELATIVE PERCENT: 3.5 %
MONOCYTES RELATIVE PERCENT: 3.7 %
MONOCYTES RELATIVE PERCENT: 4.2 %
MONOCYTES RELATIVE PERCENT: 9 %
MONOCYTES RELATIVE PERCENT: 9.1 %
MONOCYTES RELATIVE PERCENT: 9.6 %
MRSA SCREEN RT-PCR: NORMAL
NEUTROPHILS ABSOLUTE: 11.1 K/UL (ref 1.7–7.7)
NEUTROPHILS ABSOLUTE: 13.4 K/UL (ref 1.7–7.7)
NEUTROPHILS ABSOLUTE: 14.4 K/UL (ref 1.7–7.7)
NEUTROPHILS ABSOLUTE: 14.5 K/UL (ref 1.7–7.7)
NEUTROPHILS ABSOLUTE: 16.7 K/UL (ref 1.7–7.7)
NEUTROPHILS ABSOLUTE: 4.7 K/UL (ref 1.7–7.7)
NEUTROPHILS ABSOLUTE: 5 K/UL (ref 1.7–7.7)
NEUTROPHILS ABSOLUTE: 5.4 K/UL (ref 1.7–7.7)
NEUTROPHILS ABSOLUTE: 7.9 K/UL (ref 1.7–7.7)
NEUTROPHILS ABSOLUTE: 8.3 K/UL (ref 1.7–7.7)
NEUTROPHILS ABSOLUTE: 9.8 K/UL (ref 1.7–7.7)
NEUTROPHILS RELATIVE PERCENT: 74.5 %
NEUTROPHILS RELATIVE PERCENT: 75.5 %
NEUTROPHILS RELATIVE PERCENT: 77.5 %
NEUTROPHILS RELATIVE PERCENT: 79.2 %
NEUTROPHILS RELATIVE PERCENT: 81.7 %
NEUTROPHILS RELATIVE PERCENT: 83.3 %
NEUTROPHILS RELATIVE PERCENT: 83.8 %
NEUTROPHILS RELATIVE PERCENT: 87.9 %
NEUTROPHILS RELATIVE PERCENT: 88.5 %
NEUTROPHILS RELATIVE PERCENT: 92.8 %
NEUTROPHILS RELATIVE PERCENT: 93.2 %
NITRITE, URINE: NEGATIVE
O2 SAT, VEN: 98 %
O2 THERAPY: ABNORMAL
ORGANISM: ABNORMAL
PCO2, VEN: 30 MMHG (ref 40–50)
PDW BLD-RTO: 14 % (ref 12.4–15.4)
PDW BLD-RTO: 15 % (ref 12.4–15.4)
PDW BLD-RTO: 15.2 % (ref 12.4–15.4)
PDW BLD-RTO: 15.3 % (ref 12.4–15.4)
PDW BLD-RTO: 17.6 % (ref 12.4–15.4)
PDW BLD-RTO: 17.8 % (ref 12.4–15.4)
PDW BLD-RTO: 18 % (ref 12.4–15.4)
PDW BLD-RTO: 18.3 % (ref 12.4–15.4)
PDW BLD-RTO: 18.4 % (ref 12.4–15.4)
PDW BLD-RTO: 18.5 % (ref 12.4–15.4)
PDW BLD-RTO: 18.6 % (ref 12.4–15.4)
PERFORMED ON: ABNORMAL
PERFORMED ON: NORMAL
PH UA: 5.5 (ref 5–8)
PH VENOUS: 7.49 (ref 7.35–7.45)
PHOSPHORUS: 2.9 MG/DL (ref 2.5–4.9)
PHOSPHORUS: 3 MG/DL (ref 2.5–4.9)
PHOSPHORUS: 3 MG/DL (ref 2.5–4.9)
PHOSPHORUS: 3.1 MG/DL (ref 2.5–4.9)
PHOSPHORUS: 3.6 MG/DL (ref 2.5–4.9)
PHOSPHORUS: 3.6 MG/DL (ref 2.5–4.9)
PHOSPHORUS: 3.7 MG/DL (ref 2.5–4.9)
PHOSPHORUS: 3.9 MG/DL (ref 2.5–4.9)
PLATELET # BLD: 159 K/UL (ref 135–450)
PLATELET # BLD: 166 K/UL (ref 135–450)
PLATELET # BLD: 166 K/UL (ref 135–450)
PLATELET # BLD: 173 K/UL (ref 135–450)
PLATELET # BLD: 176 K/UL (ref 135–450)
PLATELET # BLD: 186 K/UL (ref 135–450)
PLATELET # BLD: 217 K/UL (ref 135–450)
PLATELET # BLD: 228 K/UL (ref 135–450)
PLATELET # BLD: 228 K/UL (ref 135–450)
PLATELET # BLD: 257 K/UL (ref 135–450)
PLATELET # BLD: 300 K/UL (ref 135–450)
PMV BLD AUTO: 10 FL (ref 5–10.5)
PMV BLD AUTO: 8.4 FL (ref 5–10.5)
PMV BLD AUTO: 8.6 FL (ref 5–10.5)
PMV BLD AUTO: 8.6 FL (ref 5–10.5)
PMV BLD AUTO: 8.8 FL (ref 5–10.5)
PMV BLD AUTO: 8.9 FL (ref 5–10.5)
PMV BLD AUTO: 9 FL (ref 5–10.5)
PMV BLD AUTO: 9.1 FL (ref 5–10.5)
PMV BLD AUTO: 9.1 FL (ref 5–10.5)
PMV BLD AUTO: 9.2 FL (ref 5–10.5)
PMV BLD AUTO: 9.5 FL (ref 5–10.5)
PO2, VEN: 115.1 MMHG (ref 25–40)
POTASSIUM REFLEX MAGNESIUM: 3.9 MMOL/L (ref 3.5–5.1)
POTASSIUM REFLEX MAGNESIUM: 3.9 MMOL/L (ref 3.5–5.1)
POTASSIUM REFLEX MAGNESIUM: 4.4 MMOL/L (ref 3.5–5.1)
POTASSIUM REFLEX MAGNESIUM: 4.6 MMOL/L (ref 3.5–5.1)
POTASSIUM SERPL-SCNC: 4.2 MMOL/L (ref 3.5–5.1)
POTASSIUM SERPL-SCNC: 4.2 MMOL/L (ref 3.5–5.1)
POTASSIUM SERPL-SCNC: 4.3 MMOL/L (ref 3.5–5.1)
POTASSIUM SERPL-SCNC: 4.5 MMOL/L (ref 3.5–5.1)
POTASSIUM SERPL-SCNC: 4.6 MMOL/L (ref 3.5–5.1)
POTASSIUM SERPL-SCNC: 4.8 MMOL/L (ref 3.5–5.1)
POTASSIUM SERPL-SCNC: 4.9 MMOL/L (ref 3.5–5.1)
POTASSIUM SERPL-SCNC: 5 MMOL/L (ref 3.5–5.1)
POTASSIUM SERPL-SCNC: 5 MMOL/L (ref 3.5–5.1)
POTASSIUM SERPL-SCNC: 5.1 MMOL/L (ref 3.5–5.1)
POTASSIUM SERPL-SCNC: 5.1 MMOL/L (ref 3.5–5.1)
POTASSIUM SERPL-SCNC: 5.5 MMOL/L (ref 3.5–5.1)
POTASSIUM SERPL-SCNC: 5.5 MMOL/L (ref 3.5–5.1)
POTASSIUM SERPL-SCNC: 5.9 MMOL/L (ref 3.5–5.1)
PRO-BNP: ABNORMAL PG/ML (ref 0–449)
PROCALCITONIN: 2.19 NG/ML (ref 0–0.15)
PROTEIN UA: 100 MG/DL
PROTEIN UA: NEGATIVE MG/DL
PROTEIN UA: NEGATIVE MG/DL
PROTHROMBIN TIME: 12.6 SEC (ref 10–13.2)
PROTHROMBIN TIME: 14.4 SEC (ref 9.9–12.7)
PROTHROMBIN TIME: 14.8 SEC (ref 9.9–12.7)
PROTHROMBIN TIME: 15.6 SEC (ref 9.9–12.7)
RBC # BLD: 2.3 M/UL (ref 4–5.2)
RBC # BLD: 2.83 M/UL (ref 4–5.2)
RBC # BLD: 3.03 M/UL (ref 4–5.2)
RBC # BLD: 3.09 M/UL (ref 4–5.2)
RBC # BLD: 3.23 M/UL (ref 4–5.2)
RBC # BLD: 3.25 M/UL (ref 4–5.2)
RBC # BLD: 3.36 M/UL (ref 4–5.2)
RBC # BLD: 3.53 M/UL (ref 4–5.2)
RBC # BLD: 3.61 M/UL (ref 4–5.2)
RBC # BLD: 3.7 M/UL (ref 4–5.2)
RBC # BLD: 3.8 M/UL (ref 4–5.2)
RBC UA: ABNORMAL /HPF (ref 0–4)
RBC UA: ABNORMAL /HPF (ref 0–4)
RENAL EPITHELIAL, UA: ABNORMAL /HPF (ref 0–1)
REPORT: NORMAL
SARS-COV-2, NAAT: NOT DETECTED
SARS-COV-2: NOT DETECTED
SODIUM BLD-SCNC: 121 MMOL/L (ref 136–145)
SODIUM BLD-SCNC: 123 MMOL/L (ref 136–145)
SODIUM BLD-SCNC: 124 MMOL/L (ref 136–145)
SODIUM BLD-SCNC: 126 MMOL/L (ref 136–145)
SODIUM BLD-SCNC: 127 MMOL/L (ref 136–145)
SODIUM BLD-SCNC: 128 MMOL/L (ref 136–145)
SODIUM BLD-SCNC: 133 MMOL/L (ref 136–145)
SODIUM BLD-SCNC: 134 MMOL/L (ref 136–145)
SODIUM BLD-SCNC: 134 MMOL/L (ref 136–145)
SODIUM BLD-SCNC: 136 MMOL/L (ref 136–145)
SODIUM BLD-SCNC: 138 MMOL/L (ref 136–145)
SODIUM BLD-SCNC: 139 MMOL/L (ref 136–145)
SODIUM BLD-SCNC: 139 MMOL/L (ref 136–145)
SODIUM BLD-SCNC: 140 MMOL/L (ref 136–145)
SPECIFIC GRAVITY UA: 1.02 (ref 1–1.03)
SPECIFIC GRAVITY UA: 1.02 (ref 1–1.03)
SPECIFIC GRAVITY UA: >=1.03 (ref 1–1.03)
SPECIMEN STATUS: NORMAL
TCO2 CALC VENOUS: 23 MMOL/L
TOTAL PROTEIN: 5 G/DL (ref 6.4–8.2)
TOTAL PROTEIN: 5 G/DL (ref 6.4–8.2)
TOTAL PROTEIN: 5.5 G/DL (ref 6.4–8.2)
TOTAL PROTEIN: 5.7 G/DL (ref 6.4–8.2)
TOTAL PROTEIN: 6.5 G/DL (ref 6.4–8.2)
TRANSFERRIN: 348 MG/DL (ref 200–360)
TROPONIN: 0.15 NG/ML
TROPONIN: 0.16 NG/ML
TROPONIN: 0.18 NG/ML
TROPONIN: 0.2 NG/ML
TSH REFLEX: 3.6 UIU/ML (ref 0.27–4.2)
URINE CULTURE, ROUTINE: ABNORMAL
URINE CULTURE, ROUTINE: NORMAL
URINE REFLEX TO CULTURE: ABNORMAL
URINE REFLEX TO CULTURE: YES
URINE TYPE: ABNORMAL
URINE TYPE: ABNORMAL
URINE TYPE: NORMAL
UROBILINOGEN, URINE: 0.2 E.U./DL
VANCOMYCIN RANDOM: 12.2 UG/ML
WBC # BLD: 10 K/UL (ref 4–11)
WBC # BLD: 12 K/UL (ref 4–11)
WBC # BLD: 13.3 K/UL (ref 4–11)
WBC # BLD: 14.4 K/UL (ref 4–11)
WBC # BLD: 16.2 K/UL (ref 4–11)
WBC # BLD: 16.5 K/UL (ref 4–11)
WBC # BLD: 18 K/UL (ref 4–11)
WBC # BLD: 6.3 K/UL (ref 4–11)
WBC # BLD: 6.7 K/UL (ref 4–11)
WBC # BLD: 7 K/UL (ref 4–11)
WBC # BLD: 9.9 K/UL (ref 4–11)
WBC UA: ABNORMAL /HPF (ref 0–5)
WBC UA: ABNORMAL /HPF (ref 0–5)

## 2021-01-01 PROCEDURE — 86850 RBC ANTIBODY SCREEN: CPT

## 2021-01-01 PROCEDURE — 81001 URINALYSIS AUTO W/SCOPE: CPT

## 2021-01-01 PROCEDURE — 99233 SBSQ HOSP IP/OBS HIGH 50: CPT | Performed by: INTERNAL MEDICINE

## 2021-01-01 PROCEDURE — 3600000005 HC SURGERY LEVEL 5 BASE: Performed by: ORTHOPAEDIC SURGERY

## 2021-01-01 PROCEDURE — G0378 HOSPITAL OBSERVATION PER HR: HCPCS

## 2021-01-01 PROCEDURE — 84484 ASSAY OF TROPONIN QUANT: CPT

## 2021-01-01 PROCEDURE — 97110 THERAPEUTIC EXERCISES: CPT

## 2021-01-01 PROCEDURE — 86900 BLOOD TYPING SEROLOGIC ABO: CPT

## 2021-01-01 PROCEDURE — 99024 POSTOP FOLLOW-UP VISIT: CPT | Performed by: PHYSICIAN ASSISTANT

## 2021-01-01 PROCEDURE — 2580000003 HC RX 258: Performed by: INTERNAL MEDICINE

## 2021-01-01 PROCEDURE — 80053 COMPREHEN METABOLIC PANEL: CPT

## 2021-01-01 PROCEDURE — 97162 PT EVAL MOD COMPLEX 30 MIN: CPT

## 2021-01-01 PROCEDURE — 6360000002 HC RX W HCPCS: Performed by: INTERNAL MEDICINE

## 2021-01-01 PROCEDURE — 6360000002 HC RX W HCPCS: Performed by: NURSE ANESTHETIST, CERTIFIED REGISTERED

## 2021-01-01 PROCEDURE — 51798 US URINE CAPACITY MEASURE: CPT

## 2021-01-01 PROCEDURE — 96374 THER/PROPH/DIAG INJ IV PUSH: CPT

## 2021-01-01 PROCEDURE — 2000000000 HC ICU R&B

## 2021-01-01 PROCEDURE — 94761 N-INVAS EAR/PLS OXIMETRY MLT: CPT

## 2021-01-01 PROCEDURE — 6370000000 HC RX 637 (ALT 250 FOR IP): Performed by: HOSPITALIST

## 2021-01-01 PROCEDURE — 97530 THERAPEUTIC ACTIVITIES: CPT

## 2021-01-01 PROCEDURE — 1200000000 HC SEMI PRIVATE

## 2021-01-01 PROCEDURE — 85025 COMPLETE CBC W/AUTO DIFF WBC: CPT

## 2021-01-01 PROCEDURE — 85610 PROTHROMBIN TIME: CPT

## 2021-01-01 PROCEDURE — 99215 OFFICE O/P EST HI 40 MIN: CPT | Performed by: ORTHOPAEDIC SURGERY

## 2021-01-01 PROCEDURE — 36430 TRANSFUSION BLD/BLD COMPNT: CPT

## 2021-01-01 PROCEDURE — 6360000002 HC RX W HCPCS: Performed by: NURSE PRACTITIONER

## 2021-01-01 PROCEDURE — 6370000000 HC RX 637 (ALT 250 FOR IP): Performed by: NURSE PRACTITIONER

## 2021-01-01 PROCEDURE — 27447 TOTAL KNEE ARTHROPLASTY: CPT | Performed by: ORTHOPAEDIC SURGERY

## 2021-01-01 PROCEDURE — 27447 TOTAL KNEE ARTHROPLASTY: CPT | Performed by: PHYSICIAN ASSISTANT

## 2021-01-01 PROCEDURE — 71045 X-RAY EXAM CHEST 1 VIEW: CPT

## 2021-01-01 PROCEDURE — 2720000010 HC SURG SUPPLY STERILE: Performed by: ORTHOPAEDIC SURGERY

## 2021-01-01 PROCEDURE — 83036 HEMOGLOBIN GLYCOSYLATED A1C: CPT

## 2021-01-01 PROCEDURE — 84100 ASSAY OF PHOSPHORUS: CPT

## 2021-01-01 PROCEDURE — 97035 APP MDLTY 1+ULTRASOUND EA 15: CPT

## 2021-01-01 PROCEDURE — 99284 EMERGENCY DEPT VISIT MOD MDM: CPT

## 2021-01-01 PROCEDURE — 2700000000 HC OXYGEN THERAPY PER DAY

## 2021-01-01 PROCEDURE — 84466 ASSAY OF TRANSFERRIN: CPT

## 2021-01-01 PROCEDURE — 87077 CULTURE AEROBIC IDENTIFY: CPT

## 2021-01-01 PROCEDURE — 96375 TX/PRO/DX INJ NEW DRUG ADDON: CPT

## 2021-01-01 PROCEDURE — 96372 THER/PROPH/DIAG INJ SC/IM: CPT

## 2021-01-01 PROCEDURE — 83735 ASSAY OF MAGNESIUM: CPT

## 2021-01-01 PROCEDURE — 7100000000 HC PACU RECOVERY - FIRST 15 MIN: Performed by: ORTHOPAEDIC SURGERY

## 2021-01-01 PROCEDURE — 87635 SARS-COV-2 COVID-19 AMP PRB: CPT

## 2021-01-01 PROCEDURE — 36415 COLL VENOUS BLD VENIPUNCTURE: CPT

## 2021-01-01 PROCEDURE — 82947 ASSAY GLUCOSE BLOOD QUANT: CPT

## 2021-01-01 PROCEDURE — 7100000001 HC PACU RECOVERY - ADDTL 15 MIN: Performed by: ORTHOPAEDIC SURGERY

## 2021-01-01 PROCEDURE — 64447 NJX AA&/STRD FEMORAL NRV IMG: CPT | Performed by: ANESTHESIOLOGY

## 2021-01-01 PROCEDURE — 93005 ELECTROCARDIOGRAM TRACING: CPT | Performed by: PHYSICIAN ASSISTANT

## 2021-01-01 PROCEDURE — 97535 SELF CARE MNGMENT TRAINING: CPT

## 2021-01-01 PROCEDURE — 6360000002 HC RX W HCPCS: Performed by: PHYSICIAN ASSISTANT

## 2021-01-01 PROCEDURE — 87086 URINE CULTURE/COLONY COUNT: CPT

## 2021-01-01 PROCEDURE — 85018 HEMOGLOBIN: CPT

## 2021-01-01 PROCEDURE — 99222 1ST HOSP IP/OBS MODERATE 55: CPT | Performed by: INTERNAL MEDICINE

## 2021-01-01 PROCEDURE — 2580000003 HC RX 258: Performed by: PHYSICIAN ASSISTANT

## 2021-01-01 PROCEDURE — 93005 ELECTROCARDIOGRAM TRACING: CPT

## 2021-01-01 PROCEDURE — 2580000003 HC RX 258: Performed by: ORTHOPAEDIC SURGERY

## 2021-01-01 PROCEDURE — 83605 ASSAY OF LACTIC ACID: CPT

## 2021-01-01 PROCEDURE — 3700000001 HC ADD 15 MINUTES (ANESTHESIA): Performed by: ORTHOPAEDIC SURGERY

## 2021-01-01 PROCEDURE — 84145 PROCALCITONIN (PCT): CPT

## 2021-01-01 PROCEDURE — 2709999900 HC NON-CHARGEABLE SUPPLY: Performed by: ORTHOPAEDIC SURGERY

## 2021-01-01 PROCEDURE — C1776 JOINT DEVICE (IMPLANTABLE): HCPCS | Performed by: ORTHOPAEDIC SURGERY

## 2021-01-01 PROCEDURE — 84443 ASSAY THYROID STIM HORMONE: CPT

## 2021-01-01 PROCEDURE — 80048 BASIC METABOLIC PNL TOTAL CA: CPT

## 2021-01-01 PROCEDURE — 2780000010 HC IMPLANT OTHER: Performed by: ORTHOPAEDIC SURGERY

## 2021-01-01 PROCEDURE — 6360000002 HC RX W HCPCS: Performed by: HOSPITALIST

## 2021-01-01 PROCEDURE — 6370000000 HC RX 637 (ALT 250 FOR IP): Performed by: INTERNAL MEDICINE

## 2021-01-01 PROCEDURE — 97166 OT EVAL MOD COMPLEX 45 MIN: CPT

## 2021-01-01 PROCEDURE — 2580000003 HC RX 258: Performed by: ANESTHESIOLOGY

## 2021-01-01 PROCEDURE — 96367 TX/PROPH/DG ADDL SEQ IV INF: CPT

## 2021-01-01 PROCEDURE — 87040 BLOOD CULTURE FOR BACTERIA: CPT

## 2021-01-01 PROCEDURE — 6370000000 HC RX 637 (ALT 250 FOR IP): Performed by: PHYSICIAN ASSISTANT

## 2021-01-01 PROCEDURE — 3E0T3BZ INTRODUCTION OF ANESTHETIC AGENT INTO PERIPHERAL NERVES AND PLEXI, PERCUTANEOUS APPROACH: ICD-10-PCS | Performed by: ORTHOPAEDIC SURGERY

## 2021-01-01 PROCEDURE — 82803 BLOOD GASES ANY COMBINATION: CPT

## 2021-01-01 PROCEDURE — 86923 COMPATIBILITY TEST ELECTRIC: CPT

## 2021-01-01 PROCEDURE — 96366 THER/PROPH/DIAG IV INF ADDON: CPT

## 2021-01-01 PROCEDURE — 93010 ELECTROCARDIOGRAM REPORT: CPT | Performed by: INTERNAL MEDICINE

## 2021-01-01 PROCEDURE — 86901 BLOOD TYPING SEROLOGIC RH(D): CPT

## 2021-01-01 PROCEDURE — 99215 OFFICE O/P EST HI 40 MIN: CPT | Performed by: PHYSICIAN ASSISTANT

## 2021-01-01 PROCEDURE — 80202 ASSAY OF VANCOMYCIN: CPT

## 2021-01-01 PROCEDURE — 74176 CT ABD & PELVIS W/O CONTRAST: CPT

## 2021-01-01 PROCEDURE — 87186 SC STD MICRODIL/AGAR DIL: CPT

## 2021-01-01 PROCEDURE — 51702 INSERT TEMP BLADDER CATH: CPT

## 2021-01-01 PROCEDURE — 6360000002 HC RX W HCPCS: Performed by: ORTHOPAEDIC SURGERY

## 2021-01-01 PROCEDURE — 83880 ASSAY OF NATRIURETIC PEPTIDE: CPT

## 2021-01-01 PROCEDURE — U0005 INFEC AGEN DETEC AMPLI PROBE: HCPCS

## 2021-01-01 PROCEDURE — 86301 IMMUNOASSAY TUMOR CA 19-9: CPT

## 2021-01-01 PROCEDURE — 86305 HUMAN EPIDIDYMIS PROTEIN 4: CPT

## 2021-01-01 PROCEDURE — 51701 INSERT BLADDER CATHETER: CPT

## 2021-01-01 PROCEDURE — 73560 X-RAY EXAM OF KNEE 1 OR 2: CPT

## 2021-01-01 PROCEDURE — P9016 RBC LEUKOCYTES REDUCED: HCPCS

## 2021-01-01 PROCEDURE — 2500000003 HC RX 250 WO HCPCS: Performed by: ORTHOPAEDIC SURGERY

## 2021-01-01 PROCEDURE — 99285 EMERGENCY DEPT VISIT HI MDM: CPT

## 2021-01-01 PROCEDURE — 81003 URINALYSIS AUTO W/O SCOPE: CPT

## 2021-01-01 PROCEDURE — 3700000000 HC ANESTHESIA ATTENDED CARE: Performed by: ORTHOPAEDIC SURGERY

## 2021-01-01 PROCEDURE — 82378 CARCINOEMBRYONIC ANTIGEN: CPT

## 2021-01-01 PROCEDURE — C1713 ANCHOR/SCREW BN/BN,TIS/BN: HCPCS | Performed by: ORTHOPAEDIC SURGERY

## 2021-01-01 PROCEDURE — C9290 INJ, BUPIVACAINE LIPOSOME: HCPCS | Performed by: ORTHOPAEDIC SURGERY

## 2021-01-01 PROCEDURE — 0SRD0J9 REPLACEMENT OF LEFT KNEE JOINT WITH SYNTHETIC SUBSTITUTE, CEMENTED, OPEN APPROACH: ICD-10-PCS | Performed by: ORTHOPAEDIC SURGERY

## 2021-01-01 PROCEDURE — 87641 MR-STAPH DNA AMP PROBE: CPT

## 2021-01-01 PROCEDURE — 99232 SBSQ HOSP IP/OBS MODERATE 35: CPT | Performed by: INTERNAL MEDICINE

## 2021-01-01 PROCEDURE — 73700 CT LOWER EXTREMITY W/O DYE: CPT

## 2021-01-01 PROCEDURE — 2500000003 HC RX 250 WO HCPCS: Performed by: PHYSICIAN ASSISTANT

## 2021-01-01 PROCEDURE — 99024 POSTOP FOLLOW-UP VISIT: CPT | Performed by: ORTHOPAEDIC SURGERY

## 2021-01-01 PROCEDURE — 87150 DNA/RNA AMPLIFIED PROBE: CPT

## 2021-01-01 PROCEDURE — 99243 OFF/OP CNSLTJ NEW/EST LOW 30: CPT | Performed by: OBSTETRICS & GYNECOLOGY

## 2021-01-01 PROCEDURE — 97116 GAIT TRAINING THERAPY: CPT

## 2021-01-01 PROCEDURE — 3600000015 HC SURGERY LEVEL 5 ADDTL 15MIN: Performed by: ORTHOPAEDIC SURGERY

## 2021-01-01 PROCEDURE — 2500000003 HC RX 250 WO HCPCS: Performed by: NURSE ANESTHETIST, CERTIFIED REGISTERED

## 2021-01-01 PROCEDURE — U0003 INFECTIOUS AGENT DETECTION BY NUCLEIC ACID (DNA OR RNA); SEVERE ACUTE RESPIRATORY SYNDROME CORONAVIRUS 2 (SARS-COV-2) (CORONAVIRUS DISEASE [COVID-19]), AMPLIFIED PROBE TECHNIQUE, MAKING USE OF HIGH THROUGHPUT TECHNOLOGIES AS DESCRIBED BY CMS-2020-01-R: HCPCS

## 2021-01-01 PROCEDURE — 2580000003 HC RX 258: Performed by: NURSE PRACTITIONER

## 2021-01-01 PROCEDURE — 85014 HEMATOCRIT: CPT

## 2021-01-01 PROCEDURE — 96365 THER/PROPH/DIAG IV INF INIT: CPT

## 2021-01-01 PROCEDURE — 2500000003 HC RX 250 WO HCPCS: Performed by: ANESTHESIOLOGY

## 2021-01-01 PROCEDURE — 86304 IMMUNOASSAY TUMOR CA 125: CPT

## 2021-01-01 PROCEDURE — 93306 TTE W/DOPPLER COMPLETE: CPT

## 2021-01-01 PROCEDURE — 85730 THROMBOPLASTIN TIME PARTIAL: CPT

## 2021-01-01 PROCEDURE — 82040 ASSAY OF SERUM ALBUMIN: CPT

## 2021-01-01 PROCEDURE — 6360000002 HC RX W HCPCS: Performed by: ANESTHESIOLOGY

## 2021-01-01 DEVICE — CEMENT BNE 40 GM RADIOPAQUE BA SIMPLEX P: Type: IMPLANTABLE DEVICE | Site: KNEE | Status: FUNCTIONAL

## 2021-01-01 DEVICE — CEMENTED EXTENSION STEM Ø13MM, 65MM
Type: IMPLANTABLE DEVICE | Site: KNEE | Status: FUNCTIONAL
Brand: GMK REVISION TOTAL KNEE SYSTEM

## 2021-01-01 DEVICE — LEGION TIB CONE ID 20 SHORT
Type: IMPLANTABLE DEVICE | Site: KNEE | Status: FUNCTIONAL
Brand: LEGION

## 2021-01-01 DEVICE — RESURFACING PATELLA SIZE 2
Type: IMPLANTABLE DEVICE | Site: KNEE | Status: FUNCTIONAL
Brand: GMK PRIMARY TOTAL KNEE SYSTEM

## 2021-01-01 DEVICE — CEMENTED EXTENSION STEM Ø11MM, 65MM
Type: IMPLANTABLE DEVICE | Site: KNEE | Status: FUNCTIONAL
Brand: GMK REVISION TOTAL KNEE SYSTEM

## 2021-01-01 RX ORDER — SODIUM CHLORIDE 9 MG/ML
25 INJECTION, SOLUTION INTRAVENOUS PRN
Status: DISCONTINUED | OUTPATIENT
Start: 2021-01-01 | End: 2021-01-01 | Stop reason: HOSPADM

## 2021-01-01 RX ORDER — SODIUM CHLORIDE 0.9 % (FLUSH) 0.9 %
5-40 SYRINGE (ML) INJECTION PRN
Status: DISCONTINUED | OUTPATIENT
Start: 2021-01-01 | End: 2021-01-01 | Stop reason: HOSPADM

## 2021-01-01 RX ORDER — LINEZOLID 2 MG/ML
600 INJECTION, SOLUTION INTRAVENOUS EVERY 12 HOURS
Status: DISCONTINUED | OUTPATIENT
Start: 2021-01-01 | End: 2021-01-01

## 2021-01-01 RX ORDER — ASPIRIN 81 MG/1
324 TABLET, CHEWABLE ORAL ONCE
Status: COMPLETED | OUTPATIENT
Start: 2021-01-01 | End: 2021-01-01

## 2021-01-01 RX ORDER — DEXTROSE MONOHYDRATE 25 G/50ML
12.5 INJECTION, SOLUTION INTRAVENOUS PRN
Status: DISCONTINUED | OUTPATIENT
Start: 2021-01-01 | End: 2021-01-01 | Stop reason: HOSPADM

## 2021-01-01 RX ORDER — ONDANSETRON 2 MG/ML
4 INJECTION INTRAMUSCULAR; INTRAVENOUS EVERY 6 HOURS PRN
Status: DISCONTINUED | OUTPATIENT
Start: 2021-01-01 | End: 2021-01-01 | Stop reason: HOSPADM

## 2021-01-01 RX ORDER — ASPIRIN 81 MG/1
81 TABLET ORAL 2 TIMES DAILY
Status: DISCONTINUED | OUTPATIENT
Start: 2021-01-01 | End: 2021-01-01

## 2021-01-01 RX ORDER — AMLODIPINE BESYLATE 5 MG/1
5 TABLET ORAL 2 TIMES DAILY
Status: DISCONTINUED | OUTPATIENT
Start: 2021-01-01 | End: 2021-01-01 | Stop reason: HOSPADM

## 2021-01-01 RX ORDER — HYDROCHLOROTHIAZIDE 12.5 MG/1
12.5 CAPSULE, GELATIN COATED ORAL DAILY
Status: DISCONTINUED | OUTPATIENT
Start: 2021-01-01 | End: 2021-01-01 | Stop reason: HOSPADM

## 2021-01-01 RX ORDER — LEVOTHYROXINE SODIUM 0.05 MG/1
50 TABLET ORAL DAILY
Status: DISCONTINUED | OUTPATIENT
Start: 2021-01-01 | End: 2021-01-01 | Stop reason: HOSPADM

## 2021-01-01 RX ORDER — ACETAMINOPHEN 325 MG/1
650 TABLET ORAL EVERY 6 HOURS PRN
Status: ON HOLD | COMMUNITY
End: 2021-01-01 | Stop reason: HOSPADM

## 2021-01-01 RX ORDER — OXYCODONE HYDROCHLORIDE 5 MG/1
5 TABLET ORAL EVERY 4 HOURS PRN
Status: DISCONTINUED | OUTPATIENT
Start: 2021-01-01 | End: 2021-01-01 | Stop reason: HOSPADM

## 2021-01-01 RX ORDER — SODIUM CHLORIDE 0.9 % (FLUSH) 0.9 %
10 SYRINGE (ML) INJECTION PRN
Status: DISCONTINUED | OUTPATIENT
Start: 2021-01-01 | End: 2021-01-01 | Stop reason: HOSPADM

## 2021-01-01 RX ORDER — MORPHINE SULFATE 2 MG/ML
1 INJECTION, SOLUTION INTRAMUSCULAR; INTRAVENOUS EVERY 5 MIN PRN
Status: DISCONTINUED | OUTPATIENT
Start: 2021-01-01 | End: 2021-01-01 | Stop reason: HOSPADM

## 2021-01-01 RX ORDER — OXYCODONE HYDROCHLORIDE 5 MG/1
10 TABLET ORAL EVERY 4 HOURS PRN
Status: DISCONTINUED | OUTPATIENT
Start: 2021-01-01 | End: 2021-01-01 | Stop reason: HOSPADM

## 2021-01-01 RX ORDER — TRANEXAMIC ACID 100 MG/ML
INJECTION, SOLUTION INTRAVENOUS PRN
Status: DISCONTINUED | OUTPATIENT
Start: 2021-01-01 | End: 2021-01-01 | Stop reason: ALTCHOICE

## 2021-01-01 RX ORDER — M-VIT,TX,IRON,MINS/CALC/FOLIC 27MG-0.4MG
1 TABLET ORAL DAILY
Status: DISCONTINUED | OUTPATIENT
Start: 2021-01-01 | End: 2021-01-01 | Stop reason: HOSPADM

## 2021-01-01 RX ORDER — ONDANSETRON 4 MG/1
4 TABLET, ORALLY DISINTEGRATING ORAL EVERY 8 HOURS PRN
Status: DISCONTINUED | OUTPATIENT
Start: 2021-01-01 | End: 2021-01-01 | Stop reason: HOSPADM

## 2021-01-01 RX ORDER — SODIUM CHLORIDE 9 MG/ML
INJECTION, SOLUTION INTRAVENOUS CONTINUOUS
Status: DISCONTINUED | OUTPATIENT
Start: 2021-01-01 | End: 2021-01-01

## 2021-01-01 RX ORDER — DEXTROSE, SODIUM CHLORIDE, SODIUM LACTATE, POTASSIUM CHLORIDE, AND CALCIUM CHLORIDE 5; .6; .31; .03; .02 G/100ML; G/100ML; G/100ML; G/100ML; G/100ML
INJECTION, SOLUTION INTRAVENOUS CONTINUOUS PRN
Status: DISCONTINUED | OUTPATIENT
Start: 2021-01-01 | End: 2021-01-01 | Stop reason: HOSPADM

## 2021-01-01 RX ORDER — CALCIUM GLUCONATE 20 MG/ML
1000 INJECTION, SOLUTION INTRAVENOUS ONCE
Status: COMPLETED | OUTPATIENT
Start: 2021-01-01 | End: 2021-01-01

## 2021-01-01 RX ORDER — SODIUM CHLORIDE 450 MG/100ML
INJECTION, SOLUTION INTRAVENOUS CONTINUOUS
Status: DISCONTINUED | OUTPATIENT
Start: 2021-01-01 | End: 2021-01-01

## 2021-01-01 RX ORDER — OXYCODONE HYDROCHLORIDE 5 MG/1
5-10 TABLET ORAL EVERY 4 HOURS PRN
Qty: 28 TABLET | Refills: 0 | Status: SHIPPED | OUTPATIENT
Start: 2021-01-01 | End: 2021-01-01

## 2021-01-01 RX ORDER — MORPHINE SULFATE 2 MG/ML
2 INJECTION, SOLUTION INTRAMUSCULAR; INTRAVENOUS EVERY 5 MIN PRN
Status: DISCONTINUED | OUTPATIENT
Start: 2021-01-01 | End: 2021-01-01 | Stop reason: HOSPADM

## 2021-01-01 RX ORDER — NICOTINE POLACRILEX 4 MG
15 LOZENGE BUCCAL PRN
Status: DISCONTINUED | OUTPATIENT
Start: 2021-01-01 | End: 2021-01-01 | Stop reason: HOSPADM

## 2021-01-01 RX ORDER — MEPERIDINE HYDROCHLORIDE 50 MG/ML
12.5 INJECTION INTRAMUSCULAR; INTRAVENOUS; SUBCUTANEOUS EVERY 5 MIN PRN
Status: DISCONTINUED | OUTPATIENT
Start: 2021-01-01 | End: 2021-01-01 | Stop reason: HOSPADM

## 2021-01-01 RX ORDER — SODIUM CHLORIDE, SODIUM LACTATE, POTASSIUM CHLORIDE, CALCIUM CHLORIDE 600; 310; 30; 20 MG/100ML; MG/100ML; MG/100ML; MG/100ML
INJECTION, SOLUTION INTRAVENOUS CONTINUOUS
Status: DISCONTINUED | OUTPATIENT
Start: 2021-01-01 | End: 2021-01-01

## 2021-01-01 RX ORDER — SENNA AND DOCUSATE SODIUM 50; 8.6 MG/1; MG/1
1 TABLET, FILM COATED ORAL DAILY
Status: DISCONTINUED | OUTPATIENT
Start: 2021-01-01 | End: 2021-01-01 | Stop reason: HOSPADM

## 2021-01-01 RX ORDER — ACETAMINOPHEN 325 MG/1
650 TABLET ORAL EVERY 6 HOURS PRN
Status: DISCONTINUED | OUTPATIENT
Start: 2021-01-01 | End: 2021-01-01 | Stop reason: HOSPADM

## 2021-01-01 RX ORDER — ATORVASTATIN CALCIUM 40 MG/1
40 TABLET, FILM COATED ORAL NIGHTLY
Status: DISCONTINUED | OUTPATIENT
Start: 2021-01-01 | End: 2021-01-01 | Stop reason: HOSPADM

## 2021-01-01 RX ORDER — ACETAMINOPHEN 650 MG/1
650 SUPPOSITORY RECTAL EVERY 4 HOURS PRN
Status: DISCONTINUED | OUTPATIENT
Start: 2021-01-01 | End: 2021-01-01 | Stop reason: HOSPADM

## 2021-01-01 RX ORDER — ONDANSETRON 2 MG/ML
INJECTION INTRAMUSCULAR; INTRAVENOUS PRN
Status: DISCONTINUED | OUTPATIENT
Start: 2021-01-01 | End: 2021-01-01 | Stop reason: SDUPTHER

## 2021-01-01 RX ORDER — OXYCODONE HYDROCHLORIDE AND ACETAMINOPHEN 5; 325 MG/1; MG/1
2 TABLET ORAL PRN
Status: DISCONTINUED | OUTPATIENT
Start: 2021-01-01 | End: 2021-01-01 | Stop reason: HOSPADM

## 2021-01-01 RX ORDER — ATROPINE SULFATE 10 MG/ML
1 SOLUTION/ DROPS OPHTHALMIC EVERY 4 HOURS PRN
Status: DISCONTINUED | OUTPATIENT
Start: 2021-01-01 | End: 2021-01-01 | Stop reason: HOSPADM

## 2021-01-01 RX ORDER — LISINOPRIL AND HYDROCHLOROTHIAZIDE 20; 12.5 MG/1; MG/1
1 TABLET ORAL DAILY
Status: DISCONTINUED | OUTPATIENT
Start: 2021-01-01 | End: 2021-01-01 | Stop reason: SDUPTHER

## 2021-01-01 RX ORDER — ACETAMINOPHEN 650 MG/1
650 SUPPOSITORY RECTAL EVERY 6 HOURS PRN
Status: DISCONTINUED | OUTPATIENT
Start: 2021-01-01 | End: 2021-01-01 | Stop reason: HOSPADM

## 2021-01-01 RX ORDER — 0.9 % SODIUM CHLORIDE 0.9 %
15 INTRAVENOUS SOLUTION INTRAVENOUS ONCE
Status: DISCONTINUED | OUTPATIENT
Start: 2021-01-01 | End: 2021-01-01

## 2021-01-01 RX ORDER — POTASSIUM CHLORIDE 7.45 MG/ML
10 INJECTION INTRAVENOUS PRN
Status: DISCONTINUED | OUTPATIENT
Start: 2021-01-01 | End: 2021-01-01

## 2021-01-01 RX ORDER — MORPHINE SULFATE 2 MG/ML
2 INJECTION, SOLUTION INTRAMUSCULAR; INTRAVENOUS
Status: DISCONTINUED | OUTPATIENT
Start: 2021-01-01 | End: 2021-01-01 | Stop reason: HOSPADM

## 2021-01-01 RX ORDER — LISINOPRIL 20 MG/1
20 TABLET ORAL DAILY
Status: DISCONTINUED | OUTPATIENT
Start: 2021-01-01 | End: 2021-01-01 | Stop reason: HOSPADM

## 2021-01-01 RX ORDER — SENNA AND DOCUSATE SODIUM 50; 8.6 MG/1; MG/1
1 TABLET, FILM COATED ORAL 2 TIMES DAILY
Status: DISCONTINUED | OUTPATIENT
Start: 2021-01-01 | End: 2021-01-01 | Stop reason: HOSPADM

## 2021-01-01 RX ORDER — OXYCODONE HYDROCHLORIDE AND ACETAMINOPHEN 5; 325 MG/1; MG/1
1 TABLET ORAL PRN
Status: DISCONTINUED | OUTPATIENT
Start: 2021-01-01 | End: 2021-01-01 | Stop reason: HOSPADM

## 2021-01-01 RX ORDER — MIDAZOLAM HYDROCHLORIDE 1 MG/ML
INJECTION INTRAMUSCULAR; INTRAVENOUS PRN
Status: DISCONTINUED | OUTPATIENT
Start: 2021-01-01 | End: 2021-01-01 | Stop reason: SDUPTHER

## 2021-01-01 RX ORDER — ASPIRIN 81 MG/1
81 TABLET ORAL DAILY
Status: DISCONTINUED | OUTPATIENT
Start: 2021-01-01 | End: 2021-01-01 | Stop reason: HOSPADM

## 2021-01-01 RX ORDER — SODIUM CHLORIDE 0.9 % (FLUSH) 0.9 %
10 SYRINGE (ML) INJECTION EVERY 12 HOURS SCHEDULED
Status: DISCONTINUED | OUTPATIENT
Start: 2021-01-01 | End: 2021-01-01 | Stop reason: HOSPADM

## 2021-01-01 RX ORDER — ATORVASTATIN CALCIUM 10 MG/1
40 TABLET, FILM COATED ORAL NIGHTLY
Status: DISCONTINUED | OUTPATIENT
Start: 2021-01-01 | End: 2021-01-01 | Stop reason: HOSPADM

## 2021-01-01 RX ORDER — VANCOMYCIN HYDROCHLORIDE 1 G/20ML
INJECTION, POWDER, LYOPHILIZED, FOR SOLUTION INTRAVENOUS PRN
Status: DISCONTINUED | OUTPATIENT
Start: 2021-01-01 | End: 2021-01-01 | Stop reason: ALTCHOICE

## 2021-01-01 RX ORDER — BUPIVACAINE HYDROCHLORIDE 2.5 MG/ML
INJECTION, SOLUTION EPIDURAL; INFILTRATION; INTRACAUDAL PRN
Status: DISCONTINUED | OUTPATIENT
Start: 2021-01-01 | End: 2021-01-01 | Stop reason: SDUPTHER

## 2021-01-01 RX ORDER — 0.9 % SODIUM CHLORIDE 0.9 %
1000 INTRAVENOUS SOLUTION INTRAVENOUS ONCE
Status: COMPLETED | OUTPATIENT
Start: 2021-01-01 | End: 2021-01-01

## 2021-01-01 RX ORDER — OXYCODONE HYDROCHLORIDE 5 MG/1
5 TABLET ORAL EVERY 6 HOURS PRN
Status: ON HOLD | COMMUNITY
End: 2021-01-01 | Stop reason: HOSPADM

## 2021-01-01 RX ORDER — POLYETHYLENE GLYCOL 3350 17 G/17G
17 POWDER, FOR SOLUTION ORAL DAILY
Status: ON HOLD | COMMUNITY
End: 2021-01-01 | Stop reason: HOSPADM

## 2021-01-01 RX ORDER — SODIUM CHLORIDE 0.9 % (FLUSH) 0.9 %
5-40 SYRINGE (ML) INJECTION EVERY 12 HOURS SCHEDULED
Status: DISCONTINUED | OUTPATIENT
Start: 2021-01-01 | End: 2021-01-01 | Stop reason: HOSPADM

## 2021-01-01 RX ORDER — POLYETHYLENE GLYCOL 3350 17 G/17G
17 POWDER, FOR SOLUTION ORAL DAILY PRN
Status: DISCONTINUED | OUTPATIENT
Start: 2021-01-01 | End: 2021-01-01 | Stop reason: HOSPADM

## 2021-01-01 RX ORDER — INSULIN GLARGINE 100 [IU]/ML
0.25 INJECTION, SOLUTION SUBCUTANEOUS NIGHTLY
Status: DISCONTINUED | OUTPATIENT
Start: 2021-01-01 | End: 2021-01-01 | Stop reason: HOSPADM

## 2021-01-01 RX ORDER — DEXTROSE MONOHYDRATE 50 MG/ML
100 INJECTION, SOLUTION INTRAVENOUS PRN
Status: DISCONTINUED | OUTPATIENT
Start: 2021-01-01 | End: 2021-01-01 | Stop reason: HOSPADM

## 2021-01-01 RX ORDER — ONDANSETRON 2 MG/ML
4 INJECTION INTRAMUSCULAR; INTRAVENOUS
Status: DISCONTINUED | OUTPATIENT
Start: 2021-01-01 | End: 2021-01-01 | Stop reason: HOSPADM

## 2021-01-01 RX ORDER — SODIUM CHLORIDE, SODIUM LACTATE, POTASSIUM CHLORIDE, CALCIUM CHLORIDE 600; 310; 30; 20 MG/100ML; MG/100ML; MG/100ML; MG/100ML
INJECTION, SOLUTION INTRAVENOUS CONTINUOUS
Status: DISCONTINUED | OUTPATIENT
Start: 2021-01-01 | End: 2021-01-01 | Stop reason: HOSPADM

## 2021-01-01 RX ORDER — ACETAMINOPHEN 325 MG/1
650 TABLET ORAL EVERY 6 HOURS
Status: DISCONTINUED | OUTPATIENT
Start: 2021-01-01 | End: 2021-01-01 | Stop reason: HOSPADM

## 2021-01-01 RX ORDER — LIDOCAINE HYDROCHLORIDE 10 MG/ML
0.3 INJECTION, SOLUTION EPIDURAL; INFILTRATION; INTRACAUDAL; PERINEURAL
Status: DISCONTINUED | OUTPATIENT
Start: 2021-01-01 | End: 2021-01-01 | Stop reason: HOSPADM

## 2021-01-01 RX ORDER — LISINOPRIL AND HYDROCHLOROTHIAZIDE 20; 12.5 MG/1; MG/1
1 TABLET ORAL DAILY
Status: ON HOLD | COMMUNITY
Start: 2021-01-01 | End: 2021-01-01 | Stop reason: HOSPADM

## 2021-01-01 RX ORDER — DEXTROSE, SODIUM CHLORIDE, AND POTASSIUM CHLORIDE 5; .45; .15 G/100ML; G/100ML; G/100ML
INJECTION INTRAVENOUS CONTINUOUS PRN
Status: DISCONTINUED | OUTPATIENT
Start: 2021-01-01 | End: 2021-01-01

## 2021-01-01 RX ORDER — FENTANYL CITRATE 50 UG/ML
INJECTION, SOLUTION INTRAMUSCULAR; INTRAVENOUS PRN
Status: DISCONTINUED | OUTPATIENT
Start: 2021-01-01 | End: 2021-01-01 | Stop reason: SDUPTHER

## 2021-01-01 RX ORDER — DEXAMETHASONE SODIUM PHOSPHATE 4 MG/ML
INJECTION, SOLUTION INTRA-ARTICULAR; INTRALESIONAL; INTRAMUSCULAR; INTRAVENOUS; SOFT TISSUE PRN
Status: DISCONTINUED | OUTPATIENT
Start: 2021-01-01 | End: 2021-01-01 | Stop reason: SDUPTHER

## 2021-01-01 RX ORDER — SODIUM CHLORIDE, SODIUM LACTATE, POTASSIUM CHLORIDE, CALCIUM CHLORIDE 600; 310; 30; 20 MG/100ML; MG/100ML; MG/100ML; MG/100ML
2000 INJECTION, SOLUTION INTRAVENOUS ONCE
Status: COMPLETED | OUTPATIENT
Start: 2021-01-01 | End: 2021-01-01

## 2021-01-01 RX ORDER — NITROGLYCERIN 0.4 MG/1
0.4 TABLET SUBLINGUAL EVERY 5 MIN PRN
Status: ON HOLD | COMMUNITY
End: 2021-01-01 | Stop reason: HOSPADM

## 2021-01-01 RX ORDER — LORAZEPAM 2 MG/ML
0.25 INJECTION INTRAMUSCULAR EVERY 4 HOURS PRN
Status: DISCONTINUED | OUTPATIENT
Start: 2021-01-01 | End: 2021-01-01 | Stop reason: HOSPADM

## 2021-01-01 RX ORDER — PROPOFOL 10 MG/ML
INJECTION, EMULSION INTRAVENOUS PRN
Status: DISCONTINUED | OUTPATIENT
Start: 2021-01-01 | End: 2021-01-01 | Stop reason: SDUPTHER

## 2021-01-01 RX ORDER — ASPIRIN 81 MG/1
81 TABLET ORAL DAILY
Status: ON HOLD | COMMUNITY
End: 2021-01-01 | Stop reason: HOSPADM

## 2021-01-01 RX ORDER — LIDOCAINE HYDROCHLORIDE 20 MG/ML
INJECTION, SOLUTION INFILTRATION; PERINEURAL PRN
Status: DISCONTINUED | OUTPATIENT
Start: 2021-01-01 | End: 2021-01-01 | Stop reason: SDUPTHER

## 2021-01-01 RX ORDER — FENOFIBRATE 160 MG/1
160 TABLET ORAL DAILY
Status: DISCONTINUED | OUTPATIENT
Start: 2021-01-01 | End: 2021-01-01 | Stop reason: HOSPADM

## 2021-01-01 RX ORDER — MAGNESIUM SULFATE 1 G/100ML
1000 INJECTION INTRAVENOUS PRN
Status: DISCONTINUED | OUTPATIENT
Start: 2021-01-01 | End: 2021-01-01

## 2021-01-01 RX ORDER — ATORVASTATIN CALCIUM 40 MG/1
40 TABLET, FILM COATED ORAL NIGHTLY
Status: DISCONTINUED | OUTPATIENT
Start: 2021-01-01 | End: 2021-01-01

## 2021-01-01 RX ORDER — CASTOR OIL AND BALSAM, PERU 788; 87 MG/G; MG/G
OINTMENT TOPICAL 2 TIMES DAILY
Status: DISCONTINUED | OUTPATIENT
Start: 2021-01-01 | End: 2021-01-01 | Stop reason: HOSPADM

## 2021-01-01 RX ORDER — ACETAMINOPHEN 325 MG/1
650 TABLET ORAL EVERY 4 HOURS PRN
Status: DISCONTINUED | OUTPATIENT
Start: 2021-01-01 | End: 2021-01-01 | Stop reason: HOSPADM

## 2021-01-01 RX ORDER — SODIUM CHLORIDE 9 MG/ML
INJECTION, SOLUTION INTRAVENOUS PRN
Status: DISCONTINUED | OUTPATIENT
Start: 2021-01-01 | End: 2021-01-01 | Stop reason: HOSPADM

## 2021-01-01 RX ORDER — SENNA AND DOCUSATE SODIUM 50; 8.6 MG/1; MG/1
1 TABLET, FILM COATED ORAL 2 TIMES DAILY
Status: ON HOLD | COMMUNITY
Start: 2021-01-01 | End: 2021-01-01 | Stop reason: HOSPADM

## 2021-01-01 RX ORDER — MORPHINE SULFATE 4 MG/ML
4 INJECTION, SOLUTION INTRAMUSCULAR; INTRAVENOUS
Status: DISCONTINUED | OUTPATIENT
Start: 2021-01-01 | End: 2021-01-01 | Stop reason: HOSPADM

## 2021-01-01 RX ORDER — POLYETHYLENE GLYCOL 3350 17 G/17G
17 POWDER, FOR SOLUTION ORAL DAILY
Status: DISCONTINUED | OUTPATIENT
Start: 2021-01-01 | End: 2021-01-01 | Stop reason: HOSPADM

## 2021-01-01 RX ORDER — INSULIN GLARGINE 100 [IU]/ML
20 INJECTION, SOLUTION SUBCUTANEOUS NIGHTLY
Status: DISCONTINUED | OUTPATIENT
Start: 2021-01-01 | End: 2021-01-01 | Stop reason: HOSPADM

## 2021-01-01 RX ORDER — HYDRALAZINE HYDROCHLORIDE 20 MG/ML
10 INJECTION INTRAMUSCULAR; INTRAVENOUS EVERY 4 HOURS PRN
Status: DISCONTINUED | OUTPATIENT
Start: 2021-01-01 | End: 2021-01-01 | Stop reason: HOSPADM

## 2021-01-01 RX ORDER — VITAMIN B COMPLEX
2000 TABLET ORAL DAILY
Status: DISCONTINUED | OUTPATIENT
Start: 2021-01-01 | End: 2021-01-01 | Stop reason: HOSPADM

## 2021-01-01 RX ADMIN — INSULIN LISPRO 1 UNITS: 100 INJECTION, SOLUTION INTRAVENOUS; SUBCUTANEOUS at 20:49

## 2021-01-01 RX ADMIN — INSULIN LISPRO 6 UNITS: 100 INJECTION, SOLUTION INTRAVENOUS; SUBCUTANEOUS at 18:00

## 2021-01-01 RX ADMIN — DEXAMETHASONE SODIUM PHOSPHATE 8 MG: 4 INJECTION, SOLUTION INTRAMUSCULAR; INTRAVENOUS at 14:07

## 2021-01-01 RX ADMIN — ACETAMINOPHEN 650 MG: 325 TABLET ORAL at 14:48

## 2021-01-01 RX ADMIN — PROPOFOL 40 MG: 10 INJECTION, EMULSION INTRAVENOUS at 14:32

## 2021-01-01 RX ADMIN — LIDOCAINE HYDROCHLORIDE 60 MG: 20 INJECTION, SOLUTION INFILTRATION; PERINEURAL at 13:59

## 2021-01-01 RX ADMIN — SODIUM ZIRCONIUM CYCLOSILICATE 10 G: 10 POWDER, FOR SUSPENSION ORAL at 18:39

## 2021-01-01 RX ADMIN — APIXABAN 2.5 MG: 2.5 TABLET, FILM COATED ORAL at 08:37

## 2021-01-01 RX ADMIN — INSULIN GLARGINE 20 UNITS: 100 INJECTION, SOLUTION SUBCUTANEOUS at 22:03

## 2021-01-01 RX ADMIN — INSULIN LISPRO 6 UNITS: 100 INJECTION, SOLUTION INTRAVENOUS; SUBCUTANEOUS at 08:38

## 2021-01-01 RX ADMIN — APIXABAN 2.5 MG: 2.5 TABLET, FILM COATED ORAL at 08:35

## 2021-01-01 RX ADMIN — POTASSIUM CHLORIDE 10 MEQ: 7.46 INJECTION, SOLUTION INTRAVENOUS at 01:07

## 2021-01-01 RX ADMIN — MUPIROCIN: 20 OINTMENT TOPICAL at 09:54

## 2021-01-01 RX ADMIN — ATORVASTATIN CALCIUM 40 MG: 10 TABLET, FILM COATED ORAL at 20:28

## 2021-01-01 RX ADMIN — ACETAMINOPHEN 650 MG: 325 TABLET ORAL at 09:30

## 2021-01-01 RX ADMIN — POLYETHYLENE GLYCOL 3350 17 G: 17 POWDER, FOR SOLUTION ORAL at 08:07

## 2021-01-01 RX ADMIN — MULTIPLE VITAMINS W/ MINERALS TAB 1 TABLET: TAB at 09:36

## 2021-01-01 RX ADMIN — INSULIN LISPRO 1 UNITS: 100 INJECTION, SOLUTION INTRAVENOUS; SUBCUTANEOUS at 21:11

## 2021-01-01 RX ADMIN — OXYCODONE 5 MG: 5 TABLET ORAL at 06:58

## 2021-01-01 RX ADMIN — POTASSIUM CHLORIDE 10 MEQ: 7.46 INJECTION, SOLUTION INTRAVENOUS at 05:24

## 2021-01-01 RX ADMIN — DOCUSATE SODIUM 50 MG AND SENNOSIDES 8.6 MG 1 TABLET: 8.6; 5 TABLET, FILM COATED ORAL at 07:32

## 2021-01-01 RX ADMIN — MULTIPLE VITAMINS W/ MINERALS TAB 1 TABLET: TAB at 08:18

## 2021-01-01 RX ADMIN — CEFAZOLIN 2000 MG: 10 INJECTION, POWDER, FOR SOLUTION INTRAVENOUS at 13:54

## 2021-01-01 RX ADMIN — METOPROLOL TARTRATE 25 MG: 25 TABLET, FILM COATED ORAL at 09:55

## 2021-01-01 RX ADMIN — APIXABAN 2.5 MG: 2.5 TABLET, FILM COATED ORAL at 21:10

## 2021-01-01 RX ADMIN — INSULIN LISPRO 6 UNITS: 100 INJECTION, SOLUTION INTRAVENOUS; SUBCUTANEOUS at 12:03

## 2021-01-01 RX ADMIN — Medication 2000 UNITS: at 22:50

## 2021-01-01 RX ADMIN — LEVOTHYROXINE SODIUM 50 MCG: 0.05 TABLET ORAL at 06:35

## 2021-01-01 RX ADMIN — APIXABAN 2.5 MG: 2.5 TABLET, FILM COATED ORAL at 21:14

## 2021-01-01 RX ADMIN — ACETAMINOPHEN 650 MG: 325 TABLET ORAL at 21:14

## 2021-01-01 RX ADMIN — FENOFIBRATE 160 MG: 160 TABLET ORAL at 22:51

## 2021-01-01 RX ADMIN — SODIUM CHLORIDE 1000 ML: 9 INJECTION, SOLUTION INTRAVENOUS at 17:03

## 2021-01-01 RX ADMIN — SODIUM CHLORIDE, SODIUM LACTATE, POTASSIUM CHLORIDE, AND CALCIUM CHLORIDE: .6; .31; .03; .02 INJECTION, SOLUTION INTRAVENOUS at 15:10

## 2021-01-01 RX ADMIN — AMLODIPINE BESYLATE 5 MG: 5 TABLET ORAL at 09:54

## 2021-01-01 RX ADMIN — ACETAMINOPHEN 650 MG: 325 TABLET ORAL at 08:58

## 2021-01-01 RX ADMIN — CEFAZOLIN 2000 MG: 10 INJECTION, POWDER, FOR SOLUTION INTRAVENOUS at 11:52

## 2021-01-01 RX ADMIN — SODIUM CHLORIDE, SODIUM LACTATE, POTASSIUM CHLORIDE, AND CALCIUM CHLORIDE: .6; .31; .03; .02 INJECTION, SOLUTION INTRAVENOUS at 13:11

## 2021-01-01 RX ADMIN — ACETAMINOPHEN 650 MG: 325 TABLET ORAL at 14:28

## 2021-01-01 RX ADMIN — ACETAMINOPHEN 650 MG: 325 TABLET ORAL at 08:37

## 2021-01-01 RX ADMIN — FENOFIBRATE 160 MG: 160 TABLET ORAL at 08:37

## 2021-01-01 RX ADMIN — INSULIN HUMAN 10 UNITS: 100 INJECTION, SOLUTION PARENTERAL at 17:04

## 2021-01-01 RX ADMIN — OXYCODONE 5 MG: 5 TABLET ORAL at 11:41

## 2021-01-01 RX ADMIN — ENOXAPARIN SODIUM 40 MG: 40 INJECTION SUBCUTANEOUS at 08:59

## 2021-01-01 RX ADMIN — MULTIPLE VITAMINS W/ MINERALS TAB 1 TABLET: TAB at 08:34

## 2021-01-01 RX ADMIN — MORPHINE SULFATE 2 MG: 2 INJECTION, SOLUTION INTRAMUSCULAR; INTRAVENOUS at 17:31

## 2021-01-01 RX ADMIN — INSULIN LISPRO 6 UNITS: 100 INJECTION, SOLUTION INTRAVENOUS; SUBCUTANEOUS at 13:00

## 2021-01-01 RX ADMIN — INSULIN LISPRO 1 UNITS: 100 INJECTION, SOLUTION INTRAVENOUS; SUBCUTANEOUS at 20:05

## 2021-01-01 RX ADMIN — Medication 10 ML: at 08:39

## 2021-01-01 RX ADMIN — SODIUM CHLORIDE 3000 MG: 900 INJECTION INTRAVENOUS at 17:16

## 2021-01-01 RX ADMIN — SODIUM CHLORIDE, SODIUM LACTATE, POTASSIUM CHLORIDE, CALCIUM CHLORIDE AND DEXTROSE MONOHYDRATE: 5; 600; 310; 30; 20 INJECTION, SOLUTION INTRAVENOUS at 06:26

## 2021-01-01 RX ADMIN — SODIUM CHLORIDE 3000 MG: 900 INJECTION INTRAVENOUS at 04:48

## 2021-01-01 RX ADMIN — POTASSIUM CHLORIDE 10 MEQ: 7.46 INJECTION, SOLUTION INTRAVENOUS at 22:35

## 2021-01-01 RX ADMIN — POLYETHYLENE GLYCOL 3350 17 G: 17 POWDER, FOR SOLUTION ORAL at 08:59

## 2021-01-01 RX ADMIN — AMLODIPINE BESYLATE 5 MG: 5 TABLET ORAL at 20:28

## 2021-01-01 RX ADMIN — ASPIRIN 324 MG: 81 TABLET, CHEWABLE ORAL at 16:58

## 2021-01-01 RX ADMIN — ACETAMINOPHEN 650 MG: 325 TABLET ORAL at 14:46

## 2021-01-01 RX ADMIN — DOCUSATE SODIUM 50 MG AND SENNOSIDES 8.6 MG 1 TABLET: 8.6; 5 TABLET, FILM COATED ORAL at 08:07

## 2021-01-01 RX ADMIN — CEFEPIME HYDROCHLORIDE 2000 MG: 2 INJECTION, POWDER, FOR SOLUTION INTRAVENOUS at 06:29

## 2021-01-01 RX ADMIN — METOPROLOL TARTRATE 25 MG: 25 TABLET, FILM COATED ORAL at 21:14

## 2021-01-01 RX ADMIN — INSULIN LISPRO 1 UNITS: 100 INJECTION, SOLUTION INTRAVENOUS; SUBCUTANEOUS at 12:57

## 2021-01-01 RX ADMIN — INSULIN GLARGINE 19 UNITS: 100 INJECTION, SOLUTION SUBCUTANEOUS at 21:10

## 2021-01-01 RX ADMIN — INSULIN LISPRO 6 UNITS: 100 INJECTION, SOLUTION INTRAVENOUS; SUBCUTANEOUS at 12:19

## 2021-01-01 RX ADMIN — SODIUM CHLORIDE: 9 INJECTION, SOLUTION INTRAVENOUS at 17:09

## 2021-01-01 RX ADMIN — SODIUM CHLORIDE, POTASSIUM CHLORIDE, SODIUM LACTATE AND CALCIUM CHLORIDE 2000 ML: 600; 310; 30; 20 INJECTION, SOLUTION INTRAVENOUS at 20:20

## 2021-01-01 RX ADMIN — ACETAMINOPHEN 650 MG: 325 TABLET ORAL at 14:33

## 2021-01-01 RX ADMIN — VANCOMYCIN HYDROCHLORIDE 1250 MG: 1 INJECTION, POWDER, LYOPHILIZED, FOR SOLUTION INTRAVENOUS at 13:19

## 2021-01-01 RX ADMIN — MIDAZOLAM HYDROCHLORIDE 1 MG: 2 INJECTION, SOLUTION INTRAMUSCULAR; INTRAVENOUS at 13:40

## 2021-01-01 RX ADMIN — Medication 1 TABLET: at 08:07

## 2021-01-01 RX ADMIN — SODIUM ZIRCONIUM CYCLOSILICATE 10 G: 10 POWDER, FOR SUSPENSION ORAL at 17:47

## 2021-01-01 RX ADMIN — ATORVASTATIN CALCIUM 40 MG: 40 TABLET, FILM COATED ORAL at 22:20

## 2021-01-01 RX ADMIN — INSULIN LISPRO 1 UNITS: 100 INJECTION, SOLUTION INTRAVENOUS; SUBCUTANEOUS at 17:20

## 2021-01-01 RX ADMIN — Medication 1 TABLET: at 08:59

## 2021-01-01 RX ADMIN — MUPIROCIN: 20 OINTMENT TOPICAL at 21:56

## 2021-01-01 RX ADMIN — LORAZEPAM 0.25 MG: 2 INJECTION INTRAMUSCULAR; INTRAVENOUS at 18:07

## 2021-01-01 RX ADMIN — ENOXAPARIN SODIUM 40 MG: 40 INJECTION SUBCUTANEOUS at 08:06

## 2021-01-01 RX ADMIN — INSULIN LISPRO 6 UNITS: 100 INJECTION, SOLUTION INTRAVENOUS; SUBCUTANEOUS at 08:19

## 2021-01-01 RX ADMIN — MUPIROCIN: 20 OINTMENT TOPICAL at 08:59

## 2021-01-01 RX ADMIN — ENOXAPARIN SODIUM 40 MG: 40 INJECTION SUBCUTANEOUS at 20:09

## 2021-01-01 RX ADMIN — METOPROLOL TARTRATE 25 MG: 25 TABLET, FILM COATED ORAL at 08:36

## 2021-01-01 RX ADMIN — ATORVASTATIN CALCIUM 40 MG: 40 TABLET, FILM COATED ORAL at 21:09

## 2021-01-01 RX ADMIN — INSULIN LISPRO 1 UNITS: 100 INJECTION, SOLUTION INTRAVENOUS; SUBCUTANEOUS at 17:29

## 2021-01-01 RX ADMIN — LEVOTHYROXINE SODIUM 50 MCG: 0.05 TABLET ORAL at 07:38

## 2021-01-01 RX ADMIN — INSULIN LISPRO 1 UNITS: 100 INJECTION, SOLUTION INTRAVENOUS; SUBCUTANEOUS at 16:38

## 2021-01-01 RX ADMIN — DOCUSATE SODIUM 50 MG AND SENNOSIDES 8.6 MG 1 TABLET: 8.6; 5 TABLET, FILM COATED ORAL at 08:34

## 2021-01-01 RX ADMIN — MIDAZOLAM HYDROCHLORIDE 1 MG: 2 INJECTION, SOLUTION INTRAMUSCULAR; INTRAVENOUS at 13:42

## 2021-01-01 RX ADMIN — ASPIRIN 81 MG: 81 TABLET, COATED ORAL at 08:07

## 2021-01-01 RX ADMIN — CASTOR OIL AND BALSAM, PERU: 788; 87 OINTMENT TOPICAL at 20:06

## 2021-01-01 RX ADMIN — INSULIN HUMAN 10 UNITS: 100 INJECTION, SOLUTION PARENTERAL at 18:41

## 2021-01-01 RX ADMIN — APIXABAN 2.5 MG: 2.5 TABLET, FILM COATED ORAL at 08:19

## 2021-01-01 RX ADMIN — INSULIN GLARGINE 20 UNITS: 100 INJECTION, SOLUTION SUBCUTANEOUS at 15:35

## 2021-01-01 RX ADMIN — AMLODIPINE BESYLATE 5 MG: 5 TABLET ORAL at 08:37

## 2021-01-01 RX ADMIN — MORPHINE SULFATE 4 MG: 4 INJECTION, SOLUTION INTRAMUSCULAR; INTRAVENOUS at 01:53

## 2021-01-01 RX ADMIN — OXYCODONE 5 MG: 5 TABLET ORAL at 06:50

## 2021-01-01 RX ADMIN — METOPROLOL TARTRATE 25 MG: 25 TABLET, FILM COATED ORAL at 08:35

## 2021-01-01 RX ADMIN — Medication 10 ML: at 08:50

## 2021-01-01 RX ADMIN — OXYCODONE 5 MG: 5 TABLET ORAL at 14:28

## 2021-01-01 RX ADMIN — METOPROLOL TARTRATE 25 MG: 25 TABLET, FILM COATED ORAL at 09:54

## 2021-01-01 RX ADMIN — INSULIN LISPRO 6 UNITS: 100 INJECTION, SOLUTION INTRAVENOUS; SUBCUTANEOUS at 17:30

## 2021-01-01 RX ADMIN — ENOXAPARIN SODIUM 40 MG: 40 INJECTION SUBCUTANEOUS at 22:26

## 2021-01-01 RX ADMIN — MUPIROCIN: 20 OINTMENT TOPICAL at 20:07

## 2021-01-01 RX ADMIN — INSULIN LISPRO 1 UNITS: 100 INJECTION, SOLUTION INTRAVENOUS; SUBCUTANEOUS at 18:01

## 2021-01-01 RX ADMIN — ATORVASTATIN CALCIUM 40 MG: 40 TABLET, FILM COATED ORAL at 22:51

## 2021-01-01 RX ADMIN — INSULIN LISPRO 6 UNITS: 100 INJECTION, SOLUTION INTRAVENOUS; SUBCUTANEOUS at 17:57

## 2021-01-01 RX ADMIN — LEVOTHYROXINE SODIUM 50 MCG: 0.05 TABLET ORAL at 06:40

## 2021-01-01 RX ADMIN — AMLODIPINE BESYLATE 5 MG: 5 TABLET ORAL at 08:49

## 2021-01-01 RX ADMIN — Medication 1 TABLET: at 07:32

## 2021-01-01 RX ADMIN — METOPROLOL TARTRATE 25 MG: 25 TABLET, FILM COATED ORAL at 22:50

## 2021-01-01 RX ADMIN — VANCOMYCIN HYDROCHLORIDE 500 MG: 500 INJECTION, POWDER, LYOPHILIZED, FOR SOLUTION INTRAVENOUS at 07:58

## 2021-01-01 RX ADMIN — FENOFIBRATE 160 MG: 160 TABLET ORAL at 09:40

## 2021-01-01 RX ADMIN — LEVOTHYROXINE SODIUM 50 MCG: 0.05 TABLET ORAL at 05:33

## 2021-01-01 RX ADMIN — MULTIPLE VITAMINS W/ MINERALS TAB 1 TABLET: TAB at 08:36

## 2021-01-01 RX ADMIN — SODIUM CHLORIDE, POTASSIUM CHLORIDE, SODIUM LACTATE AND CALCIUM CHLORIDE: 600; 310; 30; 20 INJECTION, SOLUTION INTRAVENOUS at 23:03

## 2021-01-01 RX ADMIN — AMLODIPINE BESYLATE 5 MG: 5 TABLET ORAL at 08:35

## 2021-01-01 RX ADMIN — HYDRALAZINE HYDROCHLORIDE 10 MG: 20 INJECTION INTRAMUSCULAR; INTRAVENOUS at 12:55

## 2021-01-01 RX ADMIN — ACETAMINOPHEN 650 MG: 325 TABLET ORAL at 22:49

## 2021-01-01 RX ADMIN — METOPROLOL TARTRATE 25 MG: 25 TABLET, FILM COATED ORAL at 21:56

## 2021-01-01 RX ADMIN — POTASSIUM CHLORIDE 10 MEQ: 7.46 INJECTION, SOLUTION INTRAVENOUS at 21:17

## 2021-01-01 RX ADMIN — CASTOR OIL AND BALSAM, PERU: 788; 87 OINTMENT TOPICAL at 08:08

## 2021-01-01 RX ADMIN — BUPIVACAINE HYDROCHLORIDE 20 MG: 2.5 INJECTION, SOLUTION EPIDURAL; INFILTRATION; INTRACAUDAL; PERINEURAL at 13:47

## 2021-01-01 RX ADMIN — CASTOR OIL AND BALSAM, PERU: 788; 87 OINTMENT TOPICAL at 21:56

## 2021-01-01 RX ADMIN — METOPROLOL TARTRATE 25 MG: 25 TABLET, FILM COATED ORAL at 08:07

## 2021-01-01 RX ADMIN — ASPIRIN 81 MG: 81 TABLET, COATED ORAL at 08:59

## 2021-01-01 RX ADMIN — SODIUM CHLORIDE 5.72 UNITS/HR: 9 INJECTION, SOLUTION INTRAVENOUS at 07:36

## 2021-01-01 RX ADMIN — Medication 2000 UNITS: at 08:18

## 2021-01-01 RX ADMIN — AMLODIPINE BESYLATE 5 MG: 5 TABLET ORAL at 21:14

## 2021-01-01 RX ADMIN — AMLODIPINE BESYLATE 5 MG: 5 TABLET ORAL at 22:51

## 2021-01-01 RX ADMIN — METOPROLOL TARTRATE 25 MG: 25 TABLET, FILM COATED ORAL at 20:47

## 2021-01-01 RX ADMIN — INSULIN LISPRO 1 UNITS: 100 INJECTION, SOLUTION INTRAVENOUS; SUBCUTANEOUS at 12:03

## 2021-01-01 RX ADMIN — ATORVASTATIN CALCIUM 40 MG: 40 TABLET, FILM COATED ORAL at 20:48

## 2021-01-01 RX ADMIN — SODIUM CHLORIDE 3000 MG: 900 INJECTION INTRAVENOUS at 17:11

## 2021-01-01 RX ADMIN — ACETAMINOPHEN 650 MG: 325 TABLET ORAL at 21:59

## 2021-01-01 RX ADMIN — OXYCODONE 5 MG: 5 TABLET ORAL at 21:59

## 2021-01-01 RX ADMIN — ENOXAPARIN SODIUM 40 MG: 40 INJECTION SUBCUTANEOUS at 09:55

## 2021-01-01 RX ADMIN — CEFAZOLIN 2000 MG: 10 INJECTION, POWDER, FOR SOLUTION INTRAVENOUS at 23:55

## 2021-01-01 RX ADMIN — SODIUM CHLORIDE 3000 MG: 900 INJECTION INTRAVENOUS at 06:40

## 2021-01-01 RX ADMIN — DOCUSATE SODIUM 50 MG AND SENNOSIDES 8.6 MG 1 TABLET: 8.6; 5 TABLET, FILM COATED ORAL at 09:43

## 2021-01-01 RX ADMIN — ACETAMINOPHEN 650 MG: 325 TABLET ORAL at 08:19

## 2021-01-01 RX ADMIN — METOPROLOL TARTRATE 25 MG: 25 TABLET, FILM COATED ORAL at 20:07

## 2021-01-01 RX ADMIN — INSULIN LISPRO 2 UNITS: 100 INJECTION, SOLUTION INTRAVENOUS; SUBCUTANEOUS at 11:45

## 2021-01-01 RX ADMIN — INSULIN GLARGINE 19 UNITS: 100 INJECTION, SOLUTION SUBCUTANEOUS at 20:48

## 2021-01-01 RX ADMIN — INSULIN GLARGINE 19 UNITS: 100 INJECTION, SOLUTION SUBCUTANEOUS at 23:44

## 2021-01-01 RX ADMIN — ENOXAPARIN SODIUM 40 MG: 40 INJECTION SUBCUTANEOUS at 21:55

## 2021-01-01 RX ADMIN — ASPIRIN 81 MG: 81 TABLET, COATED ORAL at 09:39

## 2021-01-01 RX ADMIN — Medication 10 ML: at 11:53

## 2021-01-01 RX ADMIN — METOPROLOL TARTRATE 25 MG: 25 TABLET, FILM COATED ORAL at 21:10

## 2021-01-01 RX ADMIN — ACETAMINOPHEN 650 MG: 325 TABLET ORAL at 21:09

## 2021-01-01 RX ADMIN — CASTOR OIL AND BALSAM, PERU: 788; 87 OINTMENT TOPICAL at 09:54

## 2021-01-01 RX ADMIN — FENTANYL CITRATE 50 MCG: 50 INJECTION INTRAMUSCULAR; INTRAVENOUS at 15:37

## 2021-01-01 RX ADMIN — ENOXAPARIN SODIUM 40 MG: 40 INJECTION SUBCUTANEOUS at 08:50

## 2021-01-01 RX ADMIN — DOCUSATE SODIUM 50 MG AND SENNOSIDES 8.6 MG 1 TABLET: 8.6; 5 TABLET, FILM COATED ORAL at 08:36

## 2021-01-01 RX ADMIN — Medication 10 ML: at 09:32

## 2021-01-01 RX ADMIN — LEVOTHYROXINE SODIUM 50 MCG: 0.05 TABLET ORAL at 05:38

## 2021-01-01 RX ADMIN — DOCUSATE SODIUM 50 MG AND SENNOSIDES 8.6 MG 1 TABLET: 8.6; 5 TABLET, FILM COATED ORAL at 08:18

## 2021-01-01 RX ADMIN — OXYCODONE 5 MG: 5 TABLET ORAL at 08:18

## 2021-01-01 RX ADMIN — PROPOFOL 120 MG: 10 INJECTION, EMULSION INTRAVENOUS at 13:59

## 2021-01-01 RX ADMIN — SODIUM CHLORIDE 0.1 UNITS/KG/HR: 9 INJECTION, SOLUTION INTRAVENOUS at 20:43

## 2021-01-01 RX ADMIN — Medication 2000 UNITS: at 09:40

## 2021-01-01 RX ADMIN — Medication 10 ML: at 21:20

## 2021-01-01 RX ADMIN — INSULIN LISPRO 6 UNITS: 100 INJECTION, SOLUTION INTRAVENOUS; SUBCUTANEOUS at 16:39

## 2021-01-01 RX ADMIN — ATORVASTATIN CALCIUM 40 MG: 40 TABLET, FILM COATED ORAL at 21:56

## 2021-01-01 RX ADMIN — CALCIUM GLUCONATE 1000 MG: 20 INJECTION, SOLUTION INTRAVENOUS at 17:04

## 2021-01-01 RX ADMIN — ASPIRIN 81 MG: 81 TABLET, COATED ORAL at 20:48

## 2021-01-01 RX ADMIN — LEVOTHYROXINE SODIUM 50 MCG: 0.05 TABLET ORAL at 06:46

## 2021-01-01 RX ADMIN — LINEZOLID 600 MG: 600 INJECTION, SOLUTION INTRAVENOUS at 19:29

## 2021-01-01 RX ADMIN — ONDANSETRON 4 MG: 2 INJECTION INTRAMUSCULAR; INTRAVENOUS at 14:07

## 2021-01-01 RX ADMIN — ATORVASTATIN CALCIUM 40 MG: 40 TABLET, FILM COATED ORAL at 20:09

## 2021-01-01 RX ADMIN — FENTANYL CITRATE 25 MCG: 50 INJECTION INTRAMUSCULAR; INTRAVENOUS at 14:35

## 2021-01-01 RX ADMIN — MUPIROCIN: 20 OINTMENT TOPICAL at 08:08

## 2021-01-01 RX ADMIN — ASPIRIN 81 MG: 81 TABLET, COATED ORAL at 07:32

## 2021-01-01 RX ADMIN — CEFEPIME HYDROCHLORIDE 2000 MG: 2 INJECTION, POWDER, FOR SOLUTION INTRAVENOUS at 18:39

## 2021-01-01 RX ADMIN — LEVOTHYROXINE SODIUM 50 MCG: 0.05 TABLET ORAL at 06:14

## 2021-01-01 RX ADMIN — ACETAMINOPHEN 650 MG: 325 TABLET ORAL at 20:48

## 2021-01-01 RX ADMIN — POTASSIUM CHLORIDE 10 MEQ: 7.46 INJECTION, SOLUTION INTRAVENOUS at 03:23

## 2021-01-01 RX ADMIN — INSULIN LISPRO 6 UNITS: 100 INJECTION, SOLUTION INTRAVENOUS; SUBCUTANEOUS at 09:15

## 2021-01-01 RX ADMIN — LEVOTHYROXINE SODIUM 50 MCG: 0.05 TABLET ORAL at 06:30

## 2021-01-01 RX ADMIN — POLYETHYLENE GLYCOL 3350 17 G: 17 POWDER, FOR SOLUTION ORAL at 09:55

## 2021-01-01 RX ADMIN — INSULIN HUMAN 8 UNITS: 100 INJECTION, SOLUTION PARENTERAL at 20:44

## 2021-01-01 RX ADMIN — AMLODIPINE BESYLATE 5 MG: 5 TABLET ORAL at 20:48

## 2021-01-01 RX ADMIN — Medication 10 ML: at 21:17

## 2021-01-01 RX ADMIN — ACETAMINOPHEN 650 MG: 325 TABLET ORAL at 02:30

## 2021-01-01 RX ADMIN — FENOFIBRATE 160 MG: 160 TABLET ORAL at 08:18

## 2021-01-01 RX ADMIN — DOCUSATE SODIUM 50 MG AND SENNOSIDES 8.6 MG 1 TABLET: 8.6; 5 TABLET, FILM COATED ORAL at 21:14

## 2021-01-01 RX ADMIN — METOPROLOL TARTRATE 25 MG: 25 TABLET, FILM COATED ORAL at 20:28

## 2021-01-01 RX ADMIN — ATROPINE SULFATE 1 DROP: 10 SOLUTION/ DROPS OPHTHALMIC at 03:46

## 2021-01-01 RX ADMIN — AMLODIPINE BESYLATE 5 MG: 5 TABLET ORAL at 08:19

## 2021-01-01 RX ADMIN — METOPROLOL TARTRATE 25 MG: 25 TABLET, FILM COATED ORAL at 08:49

## 2021-01-01 RX ADMIN — INSULIN LISPRO 1 UNITS: 100 INJECTION, SOLUTION INTRAVENOUS; SUBCUTANEOUS at 21:14

## 2021-01-01 RX ADMIN — ATORVASTATIN CALCIUM 40 MG: 40 TABLET, FILM COATED ORAL at 21:14

## 2021-01-01 RX ADMIN — OXYCODONE 5 MG: 5 TABLET ORAL at 22:05

## 2021-01-01 RX ADMIN — Medication 2000 UNITS: at 08:36

## 2021-01-01 RX ADMIN — FENTANYL CITRATE 25 MCG: 50 INJECTION INTRAMUSCULAR; INTRAVENOUS at 14:52

## 2021-01-01 RX ADMIN — CASTOR OIL AND BALSAM, PERU: 788; 87 OINTMENT TOPICAL at 15:24

## 2021-01-01 RX ADMIN — AMLODIPINE BESYLATE 5 MG: 5 TABLET ORAL at 21:10

## 2021-01-01 RX ADMIN — METOPROLOL TARTRATE 25 MG: 25 TABLET, FILM COATED ORAL at 08:18

## 2021-01-01 RX ADMIN — Medication 10 ML: at 08:37

## 2021-01-01 RX ADMIN — FENOFIBRATE 160 MG: 160 TABLET ORAL at 08:34

## 2021-01-01 RX ADMIN — DOCUSATE SODIUM 50 MG AND SENNOSIDES 8.6 MG 1 TABLET: 8.6; 5 TABLET, FILM COATED ORAL at 08:59

## 2021-01-01 RX ADMIN — OXYCODONE 5 MG: 5 TABLET ORAL at 17:19

## 2021-01-01 RX ADMIN — ACETAMINOPHEN 650 MG: 325 TABLET ORAL at 06:58

## 2021-01-01 RX ADMIN — OXYCODONE 5 MG: 5 TABLET ORAL at 11:49

## 2021-01-01 RX ADMIN — ACETAMINOPHEN 650 MG: 325 TABLET ORAL at 08:36

## 2021-01-01 RX ADMIN — SODIUM CHLORIDE, POTASSIUM CHLORIDE, SODIUM LACTATE AND CALCIUM CHLORIDE: 600; 310; 30; 20 INJECTION, SOLUTION INTRAVENOUS at 19:53

## 2021-01-01 RX ADMIN — LEVOTHYROXINE SODIUM 50 MCG: 0.05 TABLET ORAL at 06:58

## 2021-01-01 ASSESSMENT — PULMONARY FUNCTION TESTS
PIF_VALUE: 11
PIF_VALUE: 10
PIF_VALUE: 11
PIF_VALUE: 14
PIF_VALUE: 11
PIF_VALUE: 10
PIF_VALUE: 2
PIF_VALUE: 11
PIF_VALUE: 10
PIF_VALUE: 11
PIF_VALUE: 10
PIF_VALUE: 11
PIF_VALUE: 11
PIF_VALUE: 10
PIF_VALUE: 11
PIF_VALUE: 13
PIF_VALUE: 11
PIF_VALUE: 10
PIF_VALUE: 10
PIF_VALUE: 11
PIF_VALUE: 11
PIF_VALUE: 10
PIF_VALUE: 10
PIF_VALUE: 11
PIF_VALUE: 10
PIF_VALUE: 13
PIF_VALUE: 11
PIF_VALUE: 10
PIF_VALUE: 11
PIF_VALUE: 10
PIF_VALUE: 11
PIF_VALUE: 11
PIF_VALUE: 13
PIF_VALUE: 10
PIF_VALUE: 11
PIF_VALUE: 10
PIF_VALUE: 10
PIF_VALUE: 11
PIF_VALUE: 10
PIF_VALUE: 11
PIF_VALUE: 10
PIF_VALUE: 11
PIF_VALUE: 10
PIF_VALUE: 11
PIF_VALUE: 13
PIF_VALUE: 10
PIF_VALUE: 13
PIF_VALUE: 10
PIF_VALUE: 11
PIF_VALUE: 11
PIF_VALUE: 12
PIF_VALUE: 11
PIF_VALUE: 10
PIF_VALUE: 11
PIF_VALUE: 13
PIF_VALUE: 10
PIF_VALUE: 11
PIF_VALUE: 10
PIF_VALUE: 10
PIF_VALUE: 9
PIF_VALUE: 11
PIF_VALUE: 11
PIF_VALUE: 14
PIF_VALUE: 10
PIF_VALUE: 11
PIF_VALUE: 10
PIF_VALUE: 11
PIF_VALUE: 11
PIF_VALUE: 15
PIF_VALUE: 11
PIF_VALUE: 10
PIF_VALUE: 10
PIF_VALUE: 11
PIF_VALUE: 11
PIF_VALUE: 10
PIF_VALUE: 11
PIF_VALUE: 10
PIF_VALUE: 10
PIF_VALUE: 11
PIF_VALUE: 1
PIF_VALUE: 11
PIF_VALUE: 10
PIF_VALUE: 10
PIF_VALUE: 11
PIF_VALUE: 10
PIF_VALUE: 10
PIF_VALUE: 11
PIF_VALUE: 10
PIF_VALUE: 10
PIF_VALUE: 11
PIF_VALUE: 11
PIF_VALUE: 10
PIF_VALUE: 5
PIF_VALUE: 10
PIF_VALUE: 11
PIF_VALUE: 10
PIF_VALUE: 10
PIF_VALUE: 11
PIF_VALUE: 10
PIF_VALUE: 10
PIF_VALUE: 11
PIF_VALUE: 12
PIF_VALUE: 11
PIF_VALUE: 10
PIF_VALUE: 10
PIF_VALUE: 11
PIF_VALUE: 11
PIF_VALUE: 10
PIF_VALUE: 11
PIF_VALUE: 14
PIF_VALUE: 11
PIF_VALUE: 10
PIF_VALUE: 11
PIF_VALUE: 10
PIF_VALUE: 11
PIF_VALUE: 11
PIF_VALUE: 10
PIF_VALUE: 11
PIF_VALUE: 10
PIF_VALUE: 11
PIF_VALUE: 10
PIF_VALUE: 11
PIF_VALUE: 11
PIF_VALUE: 10
PIF_VALUE: 10
PIF_VALUE: 11
PIF_VALUE: 11
PIF_VALUE: 10
PIF_VALUE: 11
PIF_VALUE: 10
PIF_VALUE: 14
PIF_VALUE: 10
PIF_VALUE: 11
PIF_VALUE: 10
PIF_VALUE: 10
PIF_VALUE: 11
PIF_VALUE: 2
PIF_VALUE: 11
PIF_VALUE: 10
PIF_VALUE: 10
PIF_VALUE: 11

## 2021-01-01 ASSESSMENT — PAIN DESCRIPTION - LOCATION
LOCATION: KNEE
LOCATION: BACK
LOCATION: KNEE
LOCATION: OTHER (COMMENT)
LOCATION: KNEE
LOCATION: BACK

## 2021-01-01 ASSESSMENT — PAIN SCALES - GENERAL
PAINLEVEL_OUTOF10: 6
PAINLEVEL_OUTOF10: 0
PAINLEVEL_OUTOF10: 4
PAINLEVEL_OUTOF10: 6
PAINLEVEL_OUTOF10: 0
PAINLEVEL_OUTOF10: 10
PAINLEVEL_OUTOF10: 6
PAINLEVEL_OUTOF10: 9
PAINLEVEL_OUTOF10: 0
PAINLEVEL_OUTOF10: 3
PAINLEVEL_OUTOF10: 3
PAINLEVEL_OUTOF10: 0
PAINLEVEL_OUTOF10: 7
PAINLEVEL_OUTOF10: 5
PAINLEVEL_OUTOF10: 0
PAINLEVEL_OUTOF10: 5
PAINLEVEL_OUTOF10: 7
PAINLEVEL_OUTOF10: 0
PAINLEVEL_OUTOF10: 4
PAINLEVEL_OUTOF10: 6
PAINLEVEL_OUTOF10: 0
PAINLEVEL_OUTOF10: 8
PAINLEVEL_OUTOF10: 6
PAINLEVEL_OUTOF10: 0
PAINLEVEL_OUTOF10: 3
PAINLEVEL_OUTOF10: 0
PAINLEVEL_OUTOF10: 6
PAINLEVEL_OUTOF10: 0
PAINLEVEL_OUTOF10: 4
PAINLEVEL_OUTOF10: 0
PAINLEVEL_OUTOF10: 6
PAINLEVEL_OUTOF10: 0
PAINLEVEL_OUTOF10: 4
PAINLEVEL_OUTOF10: 4
PAINLEVEL_OUTOF10: 0
PAINLEVEL_OUTOF10: 1
PAINLEVEL_OUTOF10: 6
PAINLEVEL_OUTOF10: 8
PAINLEVEL_OUTOF10: 0

## 2021-01-01 ASSESSMENT — PAIN SCALES - WONG BAKER
WONGBAKER_NUMERICALRESPONSE: 0
WONGBAKER_NUMERICALRESPONSE: 2
WONGBAKER_NUMERICALRESPONSE: 4
WONGBAKER_NUMERICALRESPONSE: 0
WONGBAKER_NUMERICALRESPONSE: 0
WONGBAKER_NUMERICALRESPONSE: 4
WONGBAKER_NUMERICALRESPONSE: 0
WONGBAKER_NUMERICALRESPONSE: 0
WONGBAKER_NUMERICALRESPONSE: 4

## 2021-01-01 ASSESSMENT — PAIN DESCRIPTION - PAIN TYPE
TYPE: OTHER (COMMENT)
TYPE: SURGICAL PAIN
TYPE: SURGICAL PAIN;CHRONIC PAIN
TYPE: CHRONIC PAIN
TYPE: CHRONIC PAIN
TYPE: ACUTE PAIN;CHRONIC PAIN
TYPE: CHRONIC PAIN
TYPE: ACUTE PAIN;CHRONIC PAIN;SURGICAL PAIN
TYPE: CHRONIC PAIN

## 2021-01-01 ASSESSMENT — ENCOUNTER SYMPTOMS
TACHYPNEA: 1
SHORTNESS OF BREATH: 0
SINUS PAIN: 0
BACK PAIN: 0
ABDOMINAL PAIN: 0
SHORTNESS OF BREATH: 0

## 2021-01-01 ASSESSMENT — PAIN DESCRIPTION - FREQUENCY
FREQUENCY: INTERMITTENT

## 2021-01-01 ASSESSMENT — PAIN DESCRIPTION - DESCRIPTORS
DESCRIPTORS: ACHING
DESCRIPTORS: ACHING;CONSTANT;DISCOMFORT
DESCRIPTORS: ACHING
DESCRIPTORS: ACHING
DESCRIPTORS: PATIENT UNABLE TO DESCRIBE
DESCRIPTORS: OTHER (COMMENT)
DESCRIPTORS: PATIENT UNABLE TO DESCRIBE
DESCRIPTORS: ACHING;DISCOMFORT

## 2021-01-01 ASSESSMENT — LIFESTYLE VARIABLES: SMOKING_STATUS: 0

## 2021-01-01 ASSESSMENT — PAIN DESCRIPTION - ONSET
ONSET: ON-GOING
ONSET: GRADUAL
ONSET: OTHER (COMMENT)

## 2021-01-01 ASSESSMENT — PAIN DESCRIPTION - PROGRESSION
CLINICAL_PROGRESSION: OTHER (COMMENT)
CLINICAL_PROGRESSION: NOT CHANGED

## 2021-01-01 ASSESSMENT — PAIN DESCRIPTION - ORIENTATION
ORIENTATION: OTHER (COMMENT)
ORIENTATION: LEFT

## 2021-01-01 ASSESSMENT — PAIN - FUNCTIONAL ASSESSMENT
PAIN_FUNCTIONAL_ASSESSMENT: PREVENTS OR INTERFERES SOME ACTIVE ACTIVITIES AND ADLS

## 2021-05-25 NOTE — PROGRESS NOTES
Chief Complaint    Knee Pain (LEFT KNEE PAIN )      History of Present Illness: Miguel Ambrosio is a 81 y.o. female presents to the office today for a new problem. Patient is here with a chief complaint of left knee pain. Patient has had left knee pain for many years. She has had multiple cortisone injections and viscosupplementation injections performed by Dr. Aarti Norton with minimal relief. Her pain symptoms are mostly concentrated over the lateral aspect of her knee. She has noticed progressive malalignment. She does have increased pain with weightbearing activities. No recent injury or trauma. Patient does have a cardiac stent. Her only anticoagulant is a baby aspirin. She also has stage III kidney disease. In addition she has had a previous right total knee arthroplasty done by Dr. Aarti Norton.            Medical History:  Past Medical History:   Diagnosis Date    CAD (coronary artery disease)     MI    Hyperlipidemia     Hypertension     Kidney disease     stage 3 kidney disease    Peripheral vascular disease ([facility] [address]      Patient Active Problem List    Diagnosis Date Noted    Prepatellar bursitis of right knee 03/04/2020    Right TKR 08/30/2011     Past Surgical History:   Procedure Laterality Date    COLONOSCOPY  2/2012    CORONARY ANGIOPLASTY WITH STENT PLACEMENT      2009    ELBOW SURGERY      left    JOINT REPLACEMENT  8/30/11     right total knee replacement    TONSILLECTOMY       Family History   Problem Relation Age of Onset    Heart Disease Mother     Cancer Mother         ovarian    Cancer Father         luekemia     Social History     Socioeconomic History    Marital status:      Spouse name: None    Number of children: None    Years of education: None    Highest education level: None   Occupational History    None   Tobacco Use    Smoking status: Never Smoker    Smokeless tobacco: Never Used   Substance and Sexual Activity    Alcohol use: Yes     Comment: occasional    Drug use: No    Sexual activity: None   Other Topics Concern    None   Social History Narrative    None     Social Determinants of Health     Financial Resource Strain:     Difficulty of Paying Living Expenses:    Food Insecurity:     Worried About Running Out of Food in the Last Year:     [address] in the Last Year:    Transportation Needs:     Lack of Transportation (Medical):  Lack of Transportation (Non-Medical):    Physical Activity:     Days of Exercise per Week:     Minutes of Exercise per Session:    Stress:     Feeling of Stress :    Social Connections:     Frequency of Communication with Friends and Family:     Frequency of Social Gatherings with Friends and Family:     Attends Taoism Services:     Active Member of Clubs or Organizations:     Attends Club or Organization Meetings:     Marital Status:    Intimate Partner Violence:     Fear of Current or Ex-Partner:     Emotionally Abused:     Physically Abused:     Sexually Abused:      Current Outpatient Medications   Medication Sig Dispense Refill    levothyroxine (SYNTHROID) 50 MCG tablet TK 1 T PO D  1    therapeutic multivitamin-minerals (THERAGRAN-M) tablet Take 1 tablet by mouth daily.  fenofibrate (TRIGLIDE) 160 MG tablet Take 160 mg by mouth daily.  amlodipine (NORVASC) 5 MG tablet Take 5 mg by mouth 2 times daily.  metoprolol (LOPRESSOR) 25 MG tablet Take 25 mg by mouth 2 times daily.  lisinopril-hydrochlorothiazide (PRINZIDE;ZESTORETIC) 20-12.5 MG per tablet Take 1 tablet by mouth daily.  atorvastatin (LIPITOR) 40 MG tablet Take 40 mg by mouth nightly.  aspirin 81 MG chewable tablet Take 81 mg by mouth daily.  Cholecalciferol (VITAMIN D3) 2000 UNIT CAPS Take 2,000 Units by mouth daily. No current facility-administered medications for this visit.        Review of Systems:  Relevant review of systems reviewed and available in the patient's chart    Vital Signs:  There were no vitals filed for this visit. General Exam:   Constitutional: Patient is adequately groomed with no evidence of malnutrition  DTRs: Deep tendon reflexes are intact  Mental Status: The patient is oriented to time, place and person. The patient's mood and affect are appropriate. Lymphatic: The lymphatic examination bilaterally reveals all areas to be without enlargement or induration. Vascular: Examination reveals no swelling or calf tenderness. Peripheral pulses are palpable and 2+. Neurological: The patient has good coordination. There is no weakness or sensory deficit. Body mass index is 29.01 kg/m². Left knee Examination:    Inspection:  No erythema or signs of infection. There are no cutaneous lesions. Patient does have valgus malalignment. Palpation:  There is tenderness to palpation along the lateral greater than medial joint line. Range of Motion: 10 extension to 100 of active flexion. Strength:  4+/5 quadriceps and hamstrings    Special Tests: The knee is stable to varus valgus stressing/anterior posterior drawer. Negative Homans test.                                 Skin: There are no rashes, ulcerations or lesions. Gait: mildly antalgic favoring the right side    Reflex 2+ patellar    Examination of the right knee reveals intact skin. There is no focal tenderness. The patient demonstrates full painless range of motion with regard to flexion and extension. Strength is 5/5 throughout all planes. Ligamentous stability is grossly intact. Examination of the right and left hip reveals intact skin. The patient demonstrates full painless range of motion with regards to flexion, abduction, internal and external rotation. There is no tenderness about the greater trochanter. There is a negative straight leg raise against resistance. Strength is 5/5 throughout all planes.     Radiology:   X-rays obtained and reviewed in office:  Views 4 views including AP weightbearing, PA flexed weightbearing, lateral, and skyline  Location left knee:    Impression:   There is advanced medial joint space thinning with sclerosis and osteophyte formation present. The patellofemoral joint with joint space thinning and osteophyte formation. No fractures are identified. Impression:  Encounter Diagnoses   Name Primary?  Left knee pain, unspecified chronicity Yes    Localized osteoarthritis of left knee     Stage 3 chronic kidney disease, unspecified whether stage 3a or 3b CKD     History of heart artery stent        Office Procedures:  Orders Placed This Encounter   Procedures    XR KNEE LEFT (MIN 4 VIEWS)     Standing Status:   Future     Number of Occurrences:   1     Standing Expiration Date:   5/25/2022     Order Specific Question:   Reason for exam:     Answer:   pain       Treatment Plan:  I discussed with the patient the nature of osteoarthritis of the knee. We talked about treatment of arthritis and the various options that are involved with this. The patient understands that the treatments can vary from essentially doing nothing to a total joint replacement arthroplasty for arthritis. I then went on to describe the utilization of glucosamine and chondroitin sulfate as a joint nutrition product. We talked about the fact that this is essentially a joint vitamin with typically minimal side effects. We also talked about utilization of prescription over-the-counter anti-inflammatory medications as the next option. We also discussed the possibility of brace wear or orthotic wear if the patient has significant varus alignment. We then went on to discuss the possibility of Visco supplementation with hyaluronate products. We talked about the typical course of this type of treatment and the fact that often times in the treatment for significant arthritis, this is successful less than half the time.  We also talked about the corticosteroid injections and the fact that this can Cortisone injections and Synvisc injections are also options when medicine has failed. We finally discussed surgical options including arthroscopic debridement versus knee replacement. Often the arthritis is too far gone for an arthroscopic debridement and pain relief will be short term. Their ultimate solution will be a knee replacement when they are ready for it. They should put it off until they can no longer stand the pain and when nothing else has worked. Conservative measures have failed. She is not interested in cortisone injections. I think she is an appropriate candidate for surgery due to her ongoing symptoms and dysfunction despite conservative measures. The procedure would be  [address] Total Knee Arthroplasty    Additional imaging needed: CT scan. Perioperative considerations include: Cardiac clearance and clearance from her primary care physician including her chronic kidney disease. .    We reviewed the risks, benefits, alternatives of this approach. We discussed risks including, but not limited to, bleeding, pain, infection, scarring, damage to the neurovascular structures, blood clots, pulmonary embolus, stiffness, implant instability or loosening, implant failure, incomplete relief of pain, and incomplete return of function. We also reviewed the surgical details, expected recovery, and rehabilitation (6-9 months). She expressed understanding and will undergo preoperative medical evaluation and optimization.

## 2021-05-25 NOTE — PROGRESS NOTES
Le Guerin    Age 81 y.o.    female    1935    MRN [id number]    7/12/2021  Arrival Time_____________  OR Time____________129 Min     Procedure(s):  LEFT TOTAL KNEE ARTHROPLASTY WITH ADDUCTOR CANAL BLOCK FOR PAIN CONTROL              MEDACTA                      Spinal     Surgeon(s): Tanja Hendricks, MD      DAY ADMIT ___  SDS/OP ___  OUTPT IN BED ___         Phone [phone] (home)    PCP _____________________ Phone_________________ Epic ( ) Epic CE ( ) Appt ________    ADDITIONAL INFO __________________________________ Cardio/Consult _____________    NOTES _____________________________________________________________________    ____________________________________________________________________________    PAT APPT DATE:________ TIME: ________  FAXED QAD: _______  (__) H&P w/ hospitalist  ____________________________________________________________________________    COVID TEST: Date/Location______________        NURSING HISTORY COMPLETE: _______  (__) CBC       (__) W/ DIFF ___________  (__)  ECHO    __________  (__) Hgb A1C    ___________  (__) CHEST X RAY   __________  (__) LIPID PROFILE  ___________  (__) EKG   __________  (__) PT/PTT   ___________  (__) PFT's   __________  (__) BMP   ___________  (__) CAROTIDS  __________  (__) CMP   ___________  (__) VEIN MAPPING  __________  (__) U/A   ___________  (__) HISTORY & PHYSICAL __________  (__) URINE C & S  ___________  (__) CARDIAC CLEARANCE __________  (__) U/A W/ FLEX  ___________  (__) PULM.  CLEARANCE __________  (__) SERUM PREGNANCY ___________  (__) Check Epic DOS orders __________  (__) TYPE & SCREEN ________ repeat ( ) (__)  __________________ __________  (__) ALBUMIN   ___________  (__)  __________________ __________  (__) TRANSFERRIN  ___________  (__)  __________________ __________  (__) LIVER PROFILE  ___________  (__)  __________________ __________  (__) CARBOXY HGB  ___________  (__) URINE PREG DOS __________  (__) NICOTINE & MET. ___________  (__) BLOOD SUGAR DOS __________  (__) PREALBUMIN  ___________    (__) MRSA NASAL SWAB ___________  (__) BLOOD THINNERS __________  (__) ACE/ ARBS: _____________________    (__) BETABLOCKERS ___________________

## 2021-05-25 NOTE — LETTER
[]Knee TROM  [x]Knee immobilizer                 []Shoulder Immob. (w/abd. pillow)  []Sling  []Ice Unit  [x]Ace-Wrap      [][employer]:  Carmita Cancino [phone], Tony [email or web address]. [email or web address]  [x]Medacta: Tanja Mead [phone], [email or web address]. [email or web address]  []Fx Shoulder: Yanet Mejia [phone], [email or web address]  [][place]: Tray Pineda [phone], [email or web address] [email or web address]. [email or web address]    Comments:        Destin Curry MD  [id number] [facility]  5/25/2021       11:17 AM EDT

## 2021-05-26 NOTE — TELEPHONE ENCOUNTER
LEFT VM FOR PATIENT, WILL CALL TO SCHEDULE CT SCAN WITH [facility] FOR SURGICAL PLANNING.   [phone]

## 2021-06-11 NOTE — TELEPHONE ENCOUNTER
General Question     Subject: REQ A SCRIPT FOR CHAIR LIFT   Patient and /or Facility Request: [id number] Number: [phone]

## 2021-06-24 PROBLEM — M17.12 LOCALIZED OSTEOARTHRITIS OF LEFT KNEE: Status: ACTIVE | Noted: 2021-01-01

## 2021-06-24 NOTE — PROGRESS NOTES
Chief Complaint    Pre-op Exam (Right knee )      History of Present Illness:  She is here for follow-up regarding discussion for surgery. She has multiple comorbidities including fear of falling, stage III kidney disease, status post cardiac stent. She wants to discuss further regarding surgery and what it entails. She walks minimally around the house. She presents today in a wheelchair. She has several questions and addressed today. She also reports to have had C. difficile after her previous surgery 10 years ago. She required p.o. antibiotics which did not resolve the problem. She also had yogurt and sauerkraut. Previous history:  presents to the office today for a new problem. Patient is here with a chief complaint of left knee pain. Patient has had left knee pain for many years. She has had multiple cortisone injections and viscosupplementation injections performed by Dr. Roma Garcia with minimal relief. Her pain symptoms are mostly concentrated over the lateral aspect of her knee. She has noticed progressive malalignment. She does have increased pain with weightbearing activities. No recent injury or trauma. Patient does have a cardiac stent. Her only anticoagulant is a baby aspirin. She also has stage III kidney disease. In addition she has had a previous right total knee arthroplasty done by Dr. Roma Garcia.            Medical History:  Past Medical History:   Diagnosis Date    CAD (coronary artery disease)     MI    Hyperlipidemia     Hypertension     Kidney disease     stage 3 kidney disease    Peripheral vascular disease ([facility] [address]      Patient Active Problem List    Diagnosis Date Noted    Prepatellar bursitis of right knee 03/04/2020    Right TKR 08/30/2011     Past Surgical History:   Procedure Laterality Date    COLONOSCOPY  2/2012    CORONARY ANGIOPLASTY WITH STENT PLACEMENT      2009    ELBOW SURGERY      left    JOINT REPLACEMENT  8/30/11     right total knee replacement    TONSILLECTOMY       Family History   Problem Relation Age of Onset    Heart Disease Mother     Cancer Mother         ovarian    Cancer Father         lulu     Social History     Socioeconomic History    Marital status:      Spouse name: None    Number of children: None    Years of education: None    Highest education level: None   Occupational History    None   Tobacco Use    Smoking status: Never Smoker    Smokeless tobacco: Never Used   Substance and Sexual Activity    Alcohol use: Yes     Comment: occasional    Drug use: No    Sexual activity: None   Other Topics Concern    None   Social History Narrative    None     Social Determinants of Health     Financial Resource Strain:     Difficulty of Paying Living Expenses:    Food Insecurity:     Worried About Running Out of Food in the Last Year:     Ran Out of Food in the Last Year:    Transportation Needs:     Lack of Transportation (Medical):  Lack of Transportation (Non-Medical):    Physical Activity:     Days of Exercise per Week:     Minutes of Exercise per Session:    Stress:     Feeling of Stress :    Social Connections:     Frequency of Communication with Friends and Family:     Frequency of Social Gatherings with Friends and Family:     Attends Hinduism Services:     Active Member of Clubs or Organizations:     Attends Club or Organization Meetings:     Marital Status:    Intimate Partner Violence:     Fear of Current or Ex-Partner:     Emotionally Abused:     Physically Abused:     Sexually Abused:      Current Outpatient Medications   Medication Sig Dispense Refill    levothyroxine (SYNTHROID) 50 MCG tablet TK 1 T PO D  1    therapeutic multivitamin-minerals (THERAGRAN-M) tablet Take 1 tablet by mouth daily.  fenofibrate (TRIGLIDE) 160 MG tablet Take 160 mg by mouth daily.  amlodipine (NORVASC) 5 MG tablet Take 5 mg by mouth 2 times daily.         metoprolol (LOPRESSOR) 25 MG tablet Take 25 mg by mouth 2 times daily.  lisinopril-hydrochlorothiazide (PRINZIDE;ZESTORETIC) 20-12.5 MG per tablet Take 1 tablet by mouth daily.  atorvastatin (LIPITOR) 40 MG tablet Take 40 mg by mouth nightly.  aspirin 81 MG chewable tablet Take 81 mg by mouth daily.  Cholecalciferol (VITAMIN D3) 2000 UNIT CAPS Take 2,000 Units by mouth daily.  [START ON 7/7/2021] mupirocin (BACTROBAN) 2 % ointment Apply BID begin 07/07/2021 for 5 days (Patient not taking: Reported on 6/24/2021) 1 Tube 0     No current facility-administered medications for this visit. Review of Systems:  Relevant review of systems reviewed and available in the patient's chart    Vital Signs: There were no vitals filed for this visit. General Exam:   Constitutional: Patient is adequately groomed with no evidence of malnutrition  DTRs: Deep tendon reflexes are intact  Mental Status: The patient is oriented to time, place and person. The patient's mood and affect are appropriate. Lymphatic: The lymphatic examination bilaterally reveals all areas to be without enlargement or induration. Vascular: Examination reveals no swelling or calf tenderness. Peripheral pulses are palpable and 2+. Neurological: The patient has good coordination. There is no weakness or sensory deficit. Body mass index is 29.01 kg/m². Left knee Examination:    Inspection:  No erythema or signs of infection. There are no cutaneous lesions. Patient does have valgus malalignment. Palpation:  There is tenderness to palpation along the lateral greater than medial joint line. Range of Motion: 30 extension to 120 of active flexion. Unable to correct flexion contracture actively or passively. Strength:  4/5 quadriceps and hamstrings    Special Tests: The knee is stable to varus valgus stressing/anterior posterior drawer. Negative Homans test.  Unable to reduce valgus deformity.                                  Skin: There are no consistent with knee arthritis    We reviewed the natural history of these conditions and treatment options ranging from conservative measures (rest, icing, activity modification, physical therapy, pain meds, cortisone injection)  to surgical options. We had a long discussion with the patient about their knee. We discussed surgical and non surgical options for knee arthritis. The most important thing is to work to maintain their range of motion. Next they can try medications including tylenol and NSAIDs. They can try glucosamine or chondroitin. They should also ice frequently and avoid activities that make their knee hurt. Cortisone injections and Synvisc injections are also options when medicine has failed. We finally discussed surgical options including arthroscopic debridement versus knee replacement. Often the arthritis is too far gone for an arthroscopic debridement and pain relief will be short term. Their ultimate solution will be a knee replacement when they are ready for it. They should put it off until they can no longer stand the pain and when nothing else has worked. Conservative measures have failed. She is not interested in cortisone injections. I think she is an appropriate candidate for surgery due to her ongoing symptoms and dysfunction despite conservative measures. The procedure would be  00988 Total Knee Arthroplasty, Complex    Perioperative considerations include: Cardiac clearance and clearance from her primary care physician . Cardiology clearance pending pending. We reviewed the risks, benefits, alternatives of this approach. We discussed risks including, but not limited to, bleeding, pain, infection, scarring, damage to the neurovascular structures, blood clots, pulmonary embolus, stiffness, implant instability or loosening, implant failure, incomplete relief of pain, and incomplete return of function.   She is at particularly high risk for perioperative morbidity and mortality with her comorbidities. In addition her flexion contracture is quite severe in the setting of severe osteoporosis. She is considerably high risk for falling due to overall frailty. This may require more constrained implant, possible hinged knee replacement to restore her motion and restore alignment. In addition, I discussed peroneal nerve palsy is a risk while correcting flexion contracture and severe valgus deformity simultaneously. We also reviewed the surgical details, expected recovery, and rehabilitation (6-9 months). She expressed understanding and will undergo preoperative medical evaluation and optimization.       Gio Diamond MD  [facility]

## 2021-07-01 NOTE — TELEPHONE ENCOUNTER
COVID: 2nd vaccine 2/20/21    Orthopedic Nurse Navigator Summary  -  Patient Name: Gonzalo Leon  Anticipated Date of Surgery:7/12/21  Using [employer]? Yes, Are they Registered: Yes  Attended Pre-Op Education Class: No  If No, why not? PCP:  Phone #:  Date of PCP Visit for H&P:7/7/21  Any Noted Concerns from PCP prior to surgery: Yes    Of greater importance is a large mid abdominal mass. Movable but clearly firm and likely a malignancy. 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 There was no comment on the CT scan as to whether or not there was adenopathy or evidence of peritoneal carcinomatosis however some further evaluation of this may need to be undertaken prior to the knee surgery although it is almost impossible to do anything with her given the degree of knee pain that she has. I am going to discuss this with her orthopedic surgeon. For the moment, clearance is on hold but ultimately she may decide to proceed as it would be very difficult to do any work with her given her pain. Dr. Davis Palma spoke with - stated pt can move forward with surgery. Is the Patient in a Pain Management Program?: No  Review of Past Medical History Reveals History of:  -  Critical Lab Values:  - Hemoglobin (g/dL): Date Value 11.4  - Hematocrit (%): Date Value  - HgbA1C : Date Value 5.8  - Albumin : Date Value 4.0  - BUN (mg/dL) : Date Value 57.0  - CREATININE (mg/dL) : Date Value 1.4  - BMI (kg/m2) : Date Value  -  Coronary Artery Disease/HTN/CHF History: Yes  Cardiologist: HTN, CAD, Stents  Cardiac Clearance Necessary: Yes  Date of Cardiac Clearance Appt: 07/07/2021- Stress test 7/8/21  On Plavix? No  If YES, when will they stop taking? Final Cardiac Recommendations:   On any anticoagulation: No  -  Diabetes History: No  Most Recent HgbA1C: 5.8  PCP or Endocrine Recommendations:  Nutritionist/Dietician Consult Scheduled:  Final Plan For Diabetic Control:  Pulmonary: COPD/Emphysema/ Use of home oxygen: NONE  Alcohol use: Casual Drinker  -  DVT Risk Stratification: Low Risk  Vascular Consult Ordered:  Date of Vascular Appt:  Hematology/Oncology Consult Ordered:  Date of Hematology/Oncology Appt:  Final Recommendation For DVT Prophylaxis:  Smoking history: Non-Smoker  Use of Estrogen:  -  BMI Greater than 40 at time of scheduling?: No  Has Surgeon been notified of BMI concern? No  Weight Loss Clinic Consult Ordered No  Date of Wt Loss Clinic Appt:  BMI at time of surgery (if went through [facility]):  -  Additional Medical Concerns:   Additional Recommendations for above concerns:  Attended Pre-Hab Program: No  Anticipated Discharge Disposition: D/C home  Who will be with patient at home following discharge?   Equipment patient already has: walker, walker, chair lift  Bedroom on first or second floor: first  Bathroom on first or second floor: first  Weight bearing status: full  Pre-op ambulatory status:  Number of entry steps: Has ramp installed   Caregiver assistance: full time  [facility]  07/02/2021

## 2021-07-12 PROBLEM — M17.12 PRIMARY LOCALIZED OSTEOARTHRITIS OF LEFT KNEE: Status: ACTIVE | Noted: 2021-01-01

## 2021-07-12 NOTE — ANESTHESIA PROCEDURE NOTES
Peripheral Block    Patient location during procedure: pre-op  Staffing  Performed: anesthesiologist   Anesthesiologist: Anastasia Woodruff MD  Preanesthetic Checklist  Completed: patient identified, IV checked, site marked, risks and benefits discussed, monitors and equipment checked, pre-op evaluation, timeout performed, anesthesia consent given, oxygen available and patient being monitored  Peripheral Block  Patient position: sitting  Prep: ChloraPrep  Patient monitoring: cardiac monitor, continuous pulse ox, frequent blood pressure checks and IV access  Block type: Femoral  Laterality: left  Injection technique: single-shot  Guidance: ultrasound guided  Local infiltration: lidocaine  Infiltration strength: 2 %  Dose: 2 mL  Adductor canal  Provider prep: mask and sterile gloves  Local infiltration: lidocaine  Needle  Needle type: combined needle/nerve stimulator   Needle gauge: 21 G  Needle localization: anatomical landmarks and ultrasound guidance  Assessment  Injection assessment: negative aspiration for heme, no paresthesia on injection and local visualized surrounding nerve on ultrasound  Slow fractionated injection: yes  Hemodynamics: stable  Additional Notes  PT DIFFICULT TO POSITION SEC TO PAIN. TIME OUT/CONSENT DONE.  IV SED (2 MG VERSED DIVIDED), PREP, U/S LOCALIZATION, NEEDLE , MULT NEG ASPS, GOOD LA SPREAD, PIC TAKEN, PT JACQUELINE WELL  Reason for block: post-op pain management and at surgeon's request

## 2021-07-12 NOTE — PROGRESS NOTES
Gave report to [facility] (floor nurse). Patient is oriented but still sleepy. Vital signs stable and patient denies pain. Patient has a block but can still wiggle toes and move feet. Palpable pulses bilat upper/lower extremities. Let family in to say good night prior to patient being transferred to floor.

## 2021-07-12 NOTE — PROGRESS NOTES
Patient has several conditions that warrant an admission to the hospital for at least 24 hrs thus her status is changes to observation. 1.  CKD: Kidney disease will not allow us to use NDSIDs and thus making it harder to control pain. Also is contributing to pre-operative anemia and increasing the potential for post operative transfusion. 2. Cardiac stents and mitral valve disease:  The patient's history of cardiac stents and anemia leads to increased risk of post-op cardiac event. This will also be increased by operative blood loss.

## 2021-07-12 NOTE — H&P
Update History & Physical     The patient's History and Physical of 7/9/2021 was reviewed with the patient and I examined the patient. There was no change. The surgical site was confirmed by the patient and me. Plan: The risks, benefits, expected outcome, and alternative to the recommended procedure have been discussed with the patient / family. Patient understands and wants to proceed with the procedure.       Electronically signed by Zev Hawkins MD on 7/12/2021 at 12:34 PM

## 2021-07-12 NOTE — ANESTHESIA POSTPROCEDURE EVALUATION
Department of Anesthesiology  Postprocedure Note    Patient: Kameron Miller  MRN: [id number]  YOB: 1935  Date of evaluation: 7/12/2021  Time:  6:14 PM     Procedure Summary     Date: 07/12/21 Room / Location: [address]    Anesthesia Start: 1354 Anesthesia Stop:     Procedure: LEFT TOTAL KNEE ARTHROPLASTY WITH ADDUCTOR CANAL BLOCK FOR PAIN CONTROL              [facility] (Left Knee) Diagnosis:       Primary osteoarthritis of left knee      (PRIMARY OSTEOARTHRITIS LEFT KNEE)    Surgeons: Raman Resendiz MD Responsible Provider: Shaila Carlin MD    Anesthesia Type: general ASA Status: 3          Anesthesia Type: No value filed. Rogerio Phase I: Rogerio Score: 10    Rogerio Phase II:      Last vitals: Reviewed and per EMR flowsheets.      Vitals:    07/06/21 1547 07/12/21 1015 07/12/21 1717 07/12/21 1720   BP:  (!) 173/80  (!) 141/57   Pulse:  81 70 67   Resp:  16  17   Temp:  97.7 °F (36.5 °C)  97.2 °F (36.2 °C)   TempSrc:  Temporal  Temporal   SpO2:  99%  96%   Weight: 169 lb (76.7 kg)      Height: 5' 4\" (1.626 m)        Anesthesia Post Evaluation    Patient location during evaluation: bedside  Patient participation: complete - patient participated  Level of consciousness: awake and alert  Airway patency: patent  Nausea & Vomiting: no nausea  Complications: no  Cardiovascular status: hemodynamically stable  Respiratory status: acceptable  Hydration status: euvolemic

## 2021-07-12 NOTE — ANESTHESIA PRE PROCEDURE
Department of Anesthesiology  Preprocedure Note       Name:  Alexandra Osorio   Age:  81 y.o.  :  1935                                          MRN:  [id number]         Date:  2021      Surgeon: Carlton Lugo): Tarsha Jackson MD    Procedure: Procedure(s):  LEFT TOTAL KNEE ARTHROPLASTY WITH ADDUCTOR CANAL BLOCK FOR PAIN CONTROL              [facility]    Medications prior to admission:   Prior to Admission medications    Medication Sig Start Date End Date Taking? Authorizing Provider   mupirocin (BACTROBAN) 2 % ointment Apply BID begin 2021 for 5 days 21 Yes Tarsha Jackson MD   levothyroxine (SYNTHROID) 50 MCG tablet Take 50 mcg by mouth Daily  19  Yes Historical Provider, MD   therapeutic multivitamin-minerals (THERAGRAN-M) tablet Take 1 tablet by mouth daily. Yes Historical Provider, MD   fenofibrate (TRIGLIDE) 160 MG tablet Take 160 mg by mouth daily. Yes Historical Provider, MD   amlodipine (NORVASC) 5 MG tablet Take 5 mg by mouth 2 times daily. Yes Historical Provider, MD   metoprolol (LOPRESSOR) 25 MG tablet Take 25 mg by mouth 2 times daily. Yes Historical Provider, MD   lisinopril-hydrochlorothiazide (PRINZIDE;ZESTORETIC) 20-12.5 MG per tablet Take 1 tablet by mouth daily. Yes Historical Provider, MD   atorvastatin (LIPITOR) 40 MG tablet Take 40 mg by mouth nightly. Yes Historical Provider, MD   aspirin 81 MG chewable tablet Take 81 mg by mouth nightly     Historical Provider, MD   Cholecalciferol (VITAMIN D3) 2000 UNIT CAPS Take 2,000 Units by mouth daily.       Historical Provider, MD       Current medications:    Current Facility-Administered Medications   Medication Dose Route Frequency Provider Last Rate Last Admin    tranexamic acid (CYKLOKAPRON) 1,000 mg in dextrose 5 % 100 mL IVPB  1,000 mg Intravenous [facility]        ceFAZolin (ANCEF) 2000 mg in dextrose 5 % 100 mL IVPB  2,000 mg Intravenous On [facility]  lactated ringers infusion   Intravenous Continuous Marthena Burkitt, MD        sodium chloride flush 0.9 % injection 5-40 mL  5-40 mL Intravenous 2 times per day Marthena Burkitt, MD        sodium chloride flush 0.9 % injection 5-40 mL  5-40 mL Intravenous PRN Marthena Burkitt, MD        0.9 % sodium chloride infusion  25 mL Intravenous PRN Marthena Burkitt, MD        lidocaine PF 1 % injection 0.3 mL  0.3 mL Intradermal Once PRN Marthena Burkitt, MD           Allergies:  No Known Allergies    Problem List:    Patient Active Problem List   Diagnosis Code    Right TKR Z96.659    Prepatellar bursitis of right knee M70.41    Localized osteoarthritis of left knee M17.12    Primary localized osteoarthritis of left knee M17.12       Past Medical History:        Diagnosis Date    Arthritis     CAD (coronary artery disease)     MI    Carotid stenosis     History of blood transfusion     s/p Rt TKR    Hyperlipidemia     Hypertension     Impaired mobility     recent falls - uses W/C    Kidney disease     stage 3 kidney disease    Mitral valve disease     and Aortic    OA (osteoarthritis)     Peripheral vascular disease ([address]     Thyroid disease     Urinary frequency        Past Surgical History:        Procedure Laterality Date    COLONOSCOPY  2/2012    CORONARY ANGIOPLASTY WITH STENT PLACEMENT      2009    ELBOW SURGERY      left    JOINT REPLACEMENT  8/30/11     right total knee replacement    TONSILLECTOMY         Social History:    Social History     Tobacco Use    Smoking status: Former Smoker    Smokeless tobacco: Never Used    Tobacco comment: smoked in her 25s    Substance Use Topics    Alcohol use: Yes     Comment: occasional                                Counseling given: Not Answered  Comment: smoked in her 25s       Vital Signs (Current):   Vitals:    07/06/21 1547 07/12/21 1015   BP:  (!) 173/80   Pulse:  81   Resp:  16   Temp:  97.7 °F (36.5 °C)   TempSrc:  Temporal   SpO2: 99%   Weight: 169 lb (76.7 kg)    Height: 5' 4\" (1.626 m)                                               BP Readings from Last 3 Encounters:   07/12/21 (!) 173/80   02/22/12 117/53       NPO Status: Time of last liquid consumption: 2000                        Time of last solid consumption: 2000                        Date of last liquid consumption: 07/11/21                        Date of last solid food consumption: 07/11/21    BMI:   Wt Readings from Last 3 Encounters:   07/06/21 169 lb (76.7 kg)   06/24/21 169 lb (76.7 kg)   05/25/21 169 lb (76.7 kg)     Body mass index is 29.01 kg/m². CBC:   Lab Results   Component Value Date    WBC 6.3 06/24/2021    RBC 3.70 06/24/2021    HGB 11.4 06/24/2021    HCT 34.3 06/24/2021    MCV 92.8 06/24/2021    RDW 15.3 06/24/2021     06/24/2021       CMP:   Lab Results   Component Value Date     07/12/2021    K 4.4 07/12/2021     07/12/2021    CO2 23 07/12/2021    BUN 42 07/12/2021    CREATININE 1.0 07/12/2021    GFRAA >60 07/12/2021    GFRAA 35 04/08/2013    AGRATIO 1.3 10/13/2014    LABGLOM 53 07/12/2021    GLUCOSE 137 07/12/2021    PROT 6.6 10/13/2014    PROT 6.8 12/10/2012    CALCIUM 10.4 07/12/2021    BILITOT 0.4 10/13/2014    ALKPHOS 65 10/13/2014    AST 27 10/13/2014    ALT 15 10/13/2014       POC Tests: No results for input(s): POCGLU, POCNA, POCK, POCCL, POCBUN, POCHEMO, POCHCT in the last 72 hours.     Coags:   Lab Results   Component Value Date    PROTIME 12.6 06/24/2021    INR 1.09 06/24/2021    APTT 31.9 06/24/2021       HCG (If Applicable): No results found for: PREGTESTUR, PREGSERUM, HCG, HCGQUANT     ABGs: No results found for: PHART, PO2ART, QMX8MAR, WKG2HIX, BEART, X3HMXVBH     Type & Screen (If Applicable):  Lab Results   Component Value Date    LABABO B 08/18/2011    [address] Negative 08/18/2011       Drug/Infectious Status (If Applicable):  No results found for: HIV, HEPCAB    COVID-19 Screening (If Applicable): No results found for: COVID19        Anesthesia Evaluation  Patient summary reviewed no history of anesthetic complications:   Airway: Mallampati: II  TM distance: >3 FB   Neck ROM: full  Mouth opening: > = 3 FB Dental:          Pulmonary:Negative Pulmonary ROS breath sounds clear to auscultation      (-) COPD, asthma, shortness of breath, recent URI, sleep apnea and not a current smoker          Patient did not smoke on day of surgery. Cardiovascular:    (+) hypertension:, valvular problems/murmurs: AS, past MI: > 6 months, CAD: obstructive, CABG/stent (2 stents):, murmur (2-3/6 SYST C/W AS),     (-) pacemaker, dysrhythmias and  angina    ECG reviewed  Rhythm: regular  Rate: normal  Echocardiogram reviewed  Stress test reviewed       Beta Blocker:  Dose within 24 Hrs         Neuro/Psych:   Negative Neuro/Psych ROS     (-) seizures, TIA, CVA and psychiatric history           GI/Hepatic/Renal: Neg GI/Hepatic/Renal ROS  (+) renal disease (ckd3-4):,      (-) GERD, liver disease, bowel prep and no morbid obesity       Endo/Other:    (+) hypothyroidism: arthritis:., no malignancy/cancer. (-) diabetes mellitus, hyperthyroidism, blood dyscrasia, no malignancy/cancer               Abdominal:             Vascular:   + PVD, aortic or cerebral (carotid sten.), . Other Findings:           Anesthesia Plan      general     ASA 3     (Preop blk for pop pain management(ACB))  Induction: intravenous. MIPS: Postoperative opioids intended and Prophylactic antiemetics administered. Anesthetic plan and risks discussed with patient and spouse. Plan discussed with CRNA. This pre-anesthesia assessment may be used as a history and physical.    DOS STAFF ADDENDUM:    Pt seen and examined, chart reviewed (including anesthesia, drug and allergy history). No interval changes to history and physical examination. Anesthetic plan, risks, benefits, alternatives, and personnel involved discussed with patient.   Patient

## 2021-07-13 NOTE — PLAN OF CARE
Problem: Pain:  Goal: Pain level will decrease  Description: Pain level will decrease  Outcome: Ongoing  Goal: Control of acute pain  Description: Control of acute pain  Outcome: Ongoing  Goal: Control of chronic pain  Description: Control of chronic pain  Outcome: Ongoing     Pt currently complaining of no pain. RN will continue to monitor and give pain meds per [facility]

## 2021-07-13 NOTE — CARE COORDINATION
[facility]    Referral received from  to follow for home care services. I will follow for needs, and speak with patient to verify demos.     Jones White RN, BSN CTN  [facility] [phone]

## 2021-07-13 NOTE — CARE COORDINATION
CASE MANAGEMENT INITIAL ASSESSMENT      Reviewed chart and completed assessment  With: Pt and spouse. Order to see for: s/p TKR  Explained Case Management role/services. Primary contact information:  [facility] Decision Maker :   Primary Decision Maker: Lula Carver - Spouse - [phone]    Secondary Decision Maker: Eli Constantino - Spouse - [phone]          Can this person be reached and be able to respond quickly, such as within a few minutes or hours? Yes    Admit date/status: IP, 7/13/21  Diagnosis: Primary localized osteoarthritis of left knee  Is this a Readmission?:  No      Insurance: Medicare Primary, BCBS Secondary   Precert required for SNF: No       3 night stay required: No    Living arrangements, Adls, care needs, prior to admission: Lives in a two story house with spouse, ramp entrance. Master on second floor with a chair lift. Mostly bed and WC bound d/t knee pain for the last several weeks. Has help by  with ADL;s pivot transfers to [facility]. Transportation: possibly squad. Durable Medical Equipment at home:  Walker_X_Cane_X_RTS__ BSC__Shower Chair_X_  02__ HHN__ CPAP__  BiPap__  Hospital Bed__ W/C_X__ Other__________    Services in the home and/or outpatient, prior to admission: None      PT/OT recs: SNF    Hospital Exemption Notification (HEN): Needed for SNF, not initiated. Barriers to discharge: None    Plan/comments: CM met with pt and  at bedside for initial assessment. Plan is to return home with skilled [place]. No agency preference OK for referral to [facility], spoke to Vinny Hernandez with [facility]. Possibly squad transport as well at [place].  CM following-Sarahy Herzog RN      ECOC on chart for MD signature

## 2021-07-13 NOTE — PROGRESS NOTES
Physical Therapy  Facility/Department: [facility] C5 - MED SURG/ORTHO  Daily Treatment Note #2  NAME: Dixie Dean  : 1935  MRN: [id number]    Date of Service: 2021    Discharge Recommendations:  Subacute/Skilled Nursing Facility   PT Equipment Recommendations  Equipment Needed: No  If pt discharges prior to next PT session this note will serve as discharge summary. Assessment   Body structures, Functions, Activity limitations: Decreased functional mobility ; Decreased endurance;Decreased strength;Decreased posture;Decreased ROM; Increased pain  Assessment: 80 yo female on OBS post L TKR . She is WBAT with knee immobilizer. Pt reports she has not ambulated in 2 months. This afternoon session limited to bed activity only per PA request to hold OOB activity until after blood transfusion. Spouse here to observe. Pt will benefit from skilled PT to address post op deficits. Pt and spouse voice desire to discharge home with home PT. PT recommend SNF at discharge to maximize function and minimize fall risk and burden of care. Treatment Diagnosis: weakness, decreased ROM, decreased mobility  Prognosis: Good  Decision Making: Medium Complexity  PT Education: Goals;PT Role;Transfer Training;Weight-bearing Education;Home Exercise Program;General Safety; Disease Specific Education;Plan of Care  Patient Education: Disease Specific: pt educated in use of immobilizer, HEP post TKR, transfers. She voices understanding  Barriers to Learning: none  REQUIRES PT FOLLOW UP: Yes  Activity Tolerance  Activity Tolerance: Patient Tolerated treatment well  Activity Tolerance: /64  HR 81  SPO2 99%  Pt awaiting blood transfusion, [place] orth[facility] PA requested no OOB activity this afternoon     Patient Diagnosis(es): The encounter diagnosis was Localized osteoarthritis of left knee.      has a past medical history of Arthritis, CAD (coronary artery disease), Carotid stenosis, History of blood transfusion, Hyperlipidemia, Hypertension, Impaired mobility, Kidney disease, Mitral valve disease, OA (osteoarthritis), Peripheral vascular disease ([address] Thyroid disease, and Urinary frequency. has a past surgical history that includes Tonsillectomy; Elbow surgery; Coronary angioplasty with stent; joint replacement (8/30/11 ); Colonoscopy (2/2012); and Total knee arthroplasty (Left, 7/12/2021). Restrictions  Restrictions/Precautions: Weight Bearing, General Precautions, Fall Risk  Left Lower Extremity Weight Bearing: Weight Bearing As Tolerated  Left Lower Extremity Brace: Knee Immobilizer  Other position/activity restrictions: wound vac  Subjective   Chart Reviewed: Yes  Family / Caregiver Present: Yes (spouse present throughout session, son present at start of session -then left room)  Referring Practitioner: Alon Cavazos  Subjective: pt agrees to therapy  Comments: RN cleared pt for therapy, reports pt has not yet received blood transfusion. MERYL Hannah requests bed activity only this afternoon, hold OOB activity until after blood transfusion.   Pain Screening  Patient Currently in Pain: Yes  Pain Assessment  Pain Assessment: 0-10  Pain Level: 6  Pain Type: Surgical pain  Pain Location: Knee  Pain Orientation: Left  Pain Descriptors: Aching;Discomfort  Pain Frequency: Intermittent (no pain at rest)  Pain Onset: On-going  Clinical Progression: Not changed  Functional Pain Assessment: Prevents or interferes some active activities and ADLs  Non-Pharmaceutical Pain Intervention(s): Emotional support;Repositioned  Response to Pain Intervention: Patient Satisfied  Vital Signs  Pulse: 99  BP: 111/66  BP Location: Left upper arm  Patient Currently in Pain: Yes  Oxygen Therapy  SpO2: 99 %  O2 Device: None (Room air)  Pre Treatment Pain Screening  Intervention List: Patient able to continue with treatment    Orientation  Orientation  Overall Orientation Status: Within Normal Limits     Objective   Bed mobility: not assessed in pm session, Ortho MERYL Hannah requested we hold OOB activity until after blood transfusion  Transfers: Not assessed during pm session  Ambulation  Ambulation?: No        Exercises  Straight Leg Raise: 5 x RLE with minimal clearance from bed, unable to perform 6th rep. 10 x LLE min assist  Quad Sets: 15 x B max cues  Gluteal Sets: 15 x B  Hip Abduction: 10 x B assisted  Knee Short Arc Quad: 10 x LLE max assist  Knee Passive Range of Motion: R knee 15 to 90  Ankle Pumps: 10 x B   RLE General PROM: R knee lacks only 5 degrees knee extension this afternoon  LLE General PROM: L knee ROM  -15 degrees knee ext to 90 degrees flexion     AM-PAC Score     AM-PAC Inpatient Mobility without Stair Climbing Raw Score : 8 (07/13/21 0114)  AM-PAC Inpatient without Stair Climbing T-Scale Score : 30.65 (07/13/21 3520)  Mobility Inpatient CMS 0-100% Score: 80.91 (07/13/21 5530)  Mobility Inpatient without Stair CMS G-Code Modifier : CM (07/13/21 9510)   Goals  Short term goals  Time Frame for Short term goals: 1 week ( 7/19) unless otherwise specified  Short term goal 1: pt to perform bed mob with CG  Short term goal 2: pt to perform transfers with min assist of 1  Short term goal 3: pt to amb with walker 10 ft with min assist of 1-2  Short term goal 4: pt to participate in LE Ex 12-15 reps by 7/16  Patient Goals   Patient goals : \"to know how to use the knee immobilizer\"- MET    Plan    Times per week: BID 7 days wk  Times per day: Twice a day  Current Treatment Recommendations: Strengthening, ROM, Functional Mobility Training, Safety Education & Training, Gait Training, Pain Management, Transfer Training, Endurance Training  Safety Devices  Type of devices:  All fall risk precautions in place, Patient at risk for falls, Nurse notified, Call light within reach, Left in bed, Bed alarm in place     Therapy Time   Individual Concurrent Group Co-treatment   Time In 1345         Time Out 1415         Minutes 30         Timed Code Treatment Minutes: 23 Minutes

## 2021-07-13 NOTE — PROGRESS NOTES
Physical Therapy    Facility/Department: [facility] C5 - MED SURG/ORTHO  Initial Assessment    NAME: Jocelyne Dave  : 1935  MRN: [id number]    Date of Service: 2021    Discharge Recommendations:  Subacute/Skilled Nursing Facility   PT Equipment Recommendations  Equipment Needed: No (defer to next level of care)  If pt discharges prior to next PT session this note will serve as discharge summary. Assessment   Body structures, Functions, Activity limitations: Decreased functional mobility ; Decreased endurance;Decreased strength;Decreased posture;Decreased ROM; Increased pain  Assessment: 80 yo female on OBSERVATION post L TKR on 2021. Pt is WBAT with knee immobizer until adequate quad strength achieved. PLOF: pt reports she has been bed to w/c bound x 2 months with spouse assisting with ADLs and transfers. She reports sleeping in fetal position. Today pt requires mod/max assist of 1-2 for bed mob, mod of 2 for transfers sit to partial stance in STEDY, and dependent on STEDY bed to chair. Pt will benefit from skilled PT to address post op deficits. Recommend SNF at discharge to maximize function and minimize fall risk and burden of care. Prognosis: Good  Decision Making: Medium Complexity  PT Education: Goals;PT Role;Transfer Training;Weight-bearing Education;Home Exercise Program;General Safety; Disease Specific Education;Plan of Care  Patient Education: Disease Specific: pt educated in use of immobilizer, HEP post TKR, transfers. She voices understanding  Barriers to Learning: none  REQUIRES PT FOLLOW UP: Yes  Activity Tolerance  Activity Tolerance: Patient Tolerated treatment well;Patient limited by pain; Patient limited by endurance  Activity Tolerance: /64  HR 81  SPO2 99%       Patient Diagnosis(es): The encounter diagnosis was Localized osteoarthritis of left knee.      has a past medical history of Arthritis, CAD (coronary artery disease), Carotid stenosis, History of blood transfusion, History  Social/Functional History  Lives With: Spouse  Type of Home: House  Home Layout: Two level  Home Access: Ramped entrance  Bathroom Shower/Tub: Tub/Shower unit  Bathroom Toilet: Standard  Home Equipment: [employer]  ADL Assistance: Needs assistance  Homemaking Responsibilities: No (spouse completes all tasks)  Ambulation Assistance: Needs assistance  Transfer Assistance: Needs assistance (spouse provides max assist bed to chair)  Active : No  Additional Comments: Pt states she has been bed to w/c bound with max assist for transfers for about 6-8 weeks, reports several falls    Objective     RLE General PROM: R knee lacks ~10 degrees knee ext, but needs much assist to get it this straight. R hip most comfortable in flexed position, with assist gets almost to neutral position. LLE General PROM: L knee ROM  -20 degrees knee ext to 90 degrees extension  Strength : BLEs: pt performs fair glute sets, Fair Quad sets in still flexed positioning. She has been non amb for 2 months. Bed mobility  Supine to Sit: Maximum assistance  Transfers  Sit to Stand: Moderate Assistance;2 Person Assistance  Stand to sit: Moderate Assistance;2 Person Assistance  Bed to Chair: Dependent/Total (Stedy bed to chair)  Ambulation  Ambulation?: No (pt reports she has been non amb x 2 months, she is unable to reach full standing position due to knee and hip flexion)        Exercises  Straight Leg Raise: 5 x B assisted (knees in some flexion)  Quad Sets: 10 x B ( max cues, knees in some flexion)  Gluteal Sets: 10 x B  Knee Passive Range of Motion: R Knee 20 to 90 degrees  Ankle Pumps: 10 x B     Plan   Times per week: BID 7 days wk  Current Treatment Recommendations: Strengthening, ROM, Functional Mobility Training, Safety Education & Training, Gait Training, Pain Management, Transfer Training, Endurance Training  Safety Devices  Type of devices:  All fall risk precautions in place, Gait belt, Patient at risk for falls, Nurse notified, Call light within reach, Left in chair, Chair alarm in place            AM-PAC Score     AM-PAC Inpatient Mobility without Stair Climbing Raw Score : 8 (07/13/21 3833)  AM-PAC Inpatient without Stair Climbing T-Scale Score : 30.65 (07/13/21 4794)  Mobility Inpatient CMS 0-100% Score: 80.91 (07/13/21 5850)  Mobility Inpatient without Stair CMS G-Code Modifier : CM (07/13/21 8875)     Goals  Short term goals  Time Frame for Short term goals: 1 week ( 7/19) unless otherwise specified  Short term goal 1: pt to perform bed mob with CG  Short term goal 2: pt to perform transfers with min assist of 1  Short term goal 3: pt to amb with walker 10 ft with min assist of 1-2  Short term goal 4: pt to participate in LE Ex 12-15 reps by 7/16  Patient Goals   Patient goals : \"to know how to use the knee immobilizer\"- MET     Therapy Time   Individual Concurrent Group Co-treatment   Time In 0924 (750)         Time Out 0947 (805)         Minutes 23         Timed Code Treatment Minutes: 23 Minutes   Pt seen for split session as breakfast arrived and pt requested to eat while food hot and continue later. Seen 750 to 805 to begin evaluation. Then seen 924 to 947 for exercise and transfers.         Dontrell Jung, PT

## 2021-07-13 NOTE — PROGRESS NOTES
Department of Orthopedic Surgery  Nurse Practitioner   Progress Note    Subjective:     Post-Operative Day: 1 Status Post left Total Knee Arthroplasty  Systemic or Specific Complaints: Patient seen resting in bed, she has complaints at this time, mostly including feeling uncomfortable in the bed due to positioning.  and son at bedside.     Objective:     Patient Vitals for the past 24 hrs:   BP Temp Temp src Pulse Resp SpO2 Height Weight   07/13/21 1450 111/66 -- -- 99 -- 99 % -- --   07/13/21 1449 113/62 -- -- 116 -- 91 % -- --   07/13/21 1447 (!) 94/38 97.8 °F (36.6 °C) (P) Oral 79 16 (P) 95 % -- --   07/13/21 0930 111/66 97.8 °F (36.6 °C) Oral 82 16 99 % -- --   07/13/21 0417 138/67 98.4 °F (36.9 °C) Oral 75 16 95 % -- --   07/13/21 0128 -- -- -- -- -- -- 5' 4\" (1.626 m) 169 lb (76.7 kg)   07/12/21 2356 135/70 98.2 °F (36.8 °C) Oral 84 16 98 % -- --   07/12/21 2242 119/70 98 °F (36.7 °C) Oral 91 16 97 % -- --   07/12/21 2049 116/69 97.8 °F (36.6 °C) Axillary 80 18 99 % -- --   07/12/21 1939 136/72 97.7 °F (36.5 °C) Axillary 80 18 95 % -- --   07/12/21 1915 -- -- -- 75 18 96 % -- --   07/12/21 1910 (!) 134/57 -- -- 74 16 96 % -- --   07/12/21 1905 -- -- -- 80 16 95 % -- --   07/12/21 1900 (!) 138/58 -- -- 74 15 95 % -- --   07/12/21 1855 (!) 140/57 -- -- 77 18 97 % -- --   07/12/21 1850 (!) 140/56 -- -- 75 18 95 % -- --   07/12/21 1845 (!) 138/58 -- -- 74 17 95 % -- --   07/12/21 1840 -- -- -- 75 22 95 % -- --   07/12/21 1835 -- -- -- 79 16 97 % -- --   07/12/21 1830 -- -- -- 72 18 93 % -- --   07/12/21 1825 (!) 143/57 -- -- 72 17 95 % -- --   07/12/21 1820 (!) 141/55 -- -- 72 18 95 % -- --   07/12/21 1815 (!) 139/59 -- -- 71 18 95 % -- --   07/12/21 1810 -- -- -- 69 18 96 % -- --   07/12/21 1805 (!) 142/55 -- -- 69 17 95 % -- --   07/12/21 1800 (!) 139/52 -- -- 68 17 94 % -- --   07/12/21 1755 -- -- -- 67 19 94 % -- --   07/12/21 1750 -- -- -- 68 18 96 % -- --   07/12/21 1745 (!) 133/52 -- -- 67 19 94 % -- --   07/12/21 1740 (!) 128/53 -- -- 74 18 94 % -- --   07/12/21 1735 (!) 126/52 -- -- 80 22 96 % -- --   07/12/21 1730 (!) 141/56 -- -- 67 21 97 % -- --   07/12/21 1725 (!) 138/55 -- -- 70 22 96 % -- --   07/12/21 1720 (!) 141/57 97.2 °F (36.2 °C) Temporal 67 17 96 % -- --   07/12/21 1717 -- -- -- 70 -- -- -- --   07/12/21 1715 (!) 132/59 -- -- -- 12 98 % -- --       General: alert, appears stated age, cooperative and no distress   Wound: Wound clean and dry no evidence of infection. ACE, KI   Motion: Painful range of Motion   DVT Exam: No evidence of DVT seen on physical exam.       Knee swollen but thigh soft to palpation. Moving foot and ankle. Good distal pulses. Data Review  CBC:   Lab Results   Component Value Date    WBC 9.9 07/13/2021    RBC 2.30 07/13/2021    HGB 7.0 07/13/2021    HCT 20.9 07/13/2021     07/13/2021       Assessment:     Status Post left Total Knee Arthroplasty. Doing well postoperatively. Plan:      1: Continues current post-op course : Post-op labs reviewed. Acute blood loss anemia noted, 1 unit PRBC ordered by medicine. MARGY on CKD noted. Monitor, avoid nephrotoxins. Holding lisinopril and HCTA per medicine. 2:  Continue Deep venous thrombosis prophylaxis - asa 81 mg BID  3:  Continue physical therapy, OT, WBAT  4:  Continue Pain Control  5:  The patient was able to go from a wheelchair to bed for a few months prior to surgery. We anticipate this patient to return to this baseline at discharge, so we plan for home with [facility] and assistance from family as she had prior to surgery. I discussed this with the patient and her family, along with Dr. Marcela Aleman, CM. All in agreement. Discharge pending stable H&H, improvement in MARGY.     Cecilia Altman, APRN - CNP

## 2021-07-13 NOTE — DISCHARGE INSTR - COC
Continuity of Care Form    Patient Name: Marlon Loera   :  1935  MRN:  [id number]    Admit date:  2021  Discharge date:  21    Code Status Order: Full Code   Advance Directives:   [facility] Documentation       Date/Time Healthcare Directive Type of Healthcare Directive Copy in [facility] [address] Agent's Name Healthcare Agent's Phone Number    21 1030  No, patient does not have an advance directive for healthcare treatment  --  --  --  --  --    21 1115  No, patient does not have an advance directive for healthcare treatment  --  --  --  --  --            Admitting Physician:  Louise Recio MD  PCP: Jessie Barfield MD    Discharging Nurse: [facility] Unit/Room#: 3664/3772-93  Discharging Unit Phone Number: [phone]    Emergency Contact:   Extended Emergency Contact Information  Primary Emergency Contact: Дмитрий Case  Address: [address] [place] [address] Phone: [phone]  Mobile Phone: [phone]  Relation: Spouse  Secondary Emergency Contact: Natha Collet  Address: [address] Phone: [phone]  Relation: Spouse    Past Surgical History:  Past Surgical History:   Procedure Laterality Date    COLONOSCOPY  2012    CORONARY ANGIOPLASTY WITH STENT PLACEMENT      2009    ELBOW SURGERY      left    JOINT REPLACEMENT  11     right total knee replacement    TONSILLECTOMY      TOTAL KNEE ARTHROPLASTY Left 2021    LEFT TOTAL KNEE ARTHROPLASTY WITH ADDUCTOR CANAL BLOCK FOR PAIN CONTROL              MEDACTA performed by Louise Recio MD at [facility] 1       Immunization History:   Immunization History   Administered Date(s) Administered    COVID-19, Malik Peter, PF, 30mcg/0.3mL 2021, 2021       Active Problems:  Patient Active Problem List   Diagnosis Code    Right TKR B27.129    Prepatellar bursitis of right knee M70.41    Localized osteoarthritis of left knee M17.12    Primary localized osteoarthritis of left knee M17.12       Isolation/Infection:   Isolation            No Isolation          Patient Infection Status       None to display            Nurse Assessment:  Last Vital Signs: /68   Pulse 86   Temp 97.8 °F (36.6 °C) (Oral)   Resp 16   Ht 5' 4\" (1.626 m)   Wt 169 lb (76.7 kg)   SpO2 97%   BMI 29.01 kg/m²     Last documented pain score (0-10 scale): Pain Level: 6  Last Weight:   Wt Readings from Last 1 Encounters:   07/13/21 169 lb (76.7 kg)     Mental Status:  oriented and alert    IV Access:  - None    Nursing Mobility/ADLs:  Walking   Assisted  Transfer  Assisted  Bathing  Assisted  Dressing  Assisted  Toileting  Assisted  Feeding  Assisted  Med Admin  Assisted  Med Delivery   whole    Wound Care Documentation and Therapy:  Negative Pressure Wound Therapy Knee Anterior; Left (Active)   Dressing Status Clean;Dry; Intact 07/12/21 1815   Odor None 07/12/21 1815   Number of days: 0       Incision 08/31/11 Knee Right (Active)   Number of days: 3665        Elimination:  Continence:   · Bowel: Yes  · Bladder: NA   Urinary Catheter: Insertion Date: 7/15   Colostomy/Ileostomy/Ileal Conduit: No       Date of Last BM: 7/16    Intake/Output Summary (Last 24 hours) at 7/13/2021 1638  Last data filed at 7/13/2021 1314  Gross per 24 hour   Intake 146.71 ml   Output 400 ml   Net -253.29 ml     I/O last 3 completed shifts: In: 1146.7 [I.V.:1000; IV Piggyback:146.7]  Out: 400 [Urine:400]    Safety Concerns:      At Risk for Falls    Impairments/Disabilities:      None    Nutrition Therapy:  Current Nutrition Therapy:   - Oral Diet:  General    Routes of Feeding: Oral  Liquids: No Restrictions  Daily Fluid Restriction: no  Last Modified Barium Swallow with Video (Video Swallowing Test): not done    Treatments at the Time of Hospital Discharge:   Respiratory Treatments: ***  Oxygen Therapy:  is not on home oxygen therapy. Ventilator:    - No ventilator support    Rehab Therapies: Physical Therapy and Occupational Therapy  Weight Bearing Status/Restrictions: No weight bearing restirctions  Other Medical Equipment (for information only, NOT a DME order):  wheelchair and walker  Other Treatments: ***    Patient's personal belongings (please select all that are sent with patient):  None    RN SIGNATURE:  Electronically signed by Tejal Lynch RN on 7/16/21 at 1:42 PM EDT    CASE MANAGEMENT/SOCIAL WORK SECTION    Inpatient Status Date: ***    Readmission Risk Assessment Score:  Readmission Risk              Risk of Unplanned Readmission:  0           Discharging to Facility/ [address] [place] [address]   [phone]       / signature: Electronically signed by Aristides North RN on 7/16/21 at 12:57 PM EDT    PHYSICIAN SECTION    Prognosis: Good    Condition at Discharge: Stable    Rehab Potential (if transferring to Rehab): Good    Recommended Labs or Other Treatments After Discharge:     Physician Certification: I certify the above information and transfer of Meena Quiles  is necessary for the continuing treatment of the diagnosis listed and that she requires [place] for less 30 days.      Update Admission H&P: No change in H&P    PHYSICIAN SIGNATURE:  Electronically signed by MERYL Jane on 7/16/21 at 12:22 PM EDT

## 2021-07-13 NOTE — PROGRESS NOTES
Occupational Therapy   Occupational Therapy Initial Assessment/Treatment   Date: 2021   Patient Name: Kameron Miller  MRN: [id number]     : 1935    Date of Service: 2021    Discharge Recommendations:  [address]  OT Equipment Recommendations  Other: defer    Assessment   Performance deficits / Impairments: Decreased functional mobility ; Decreased safe awareness;Decreased balance;Decreased ADL status; Decreased endurance;Decreased strength;Decreased high-level IADLs    Assessment: Pt 80 yo female functioning with deficits in the areas listed above following L total knee replacement. Pt reports she lives at home with spouse who has been assisting with all adls and transfers to w/c. Pt has been limited due to pain. Pt required max A for bed mobility and mod Ax2 for sit<> STEDY. Pt educated on the importancce of activity to build strength and endurance. Pt will likely need SNF level of care at discharge based on current level x2 assist and increased need for adls. Disease Specific Education: Pt educated on importance of OOB mobility, prevention of complications of bedrest, and general safety during hospitalization. Pt verbalized understanding    Disease Specific Education: Pt educated on weight bearing status, post-op precautions, appropriate DME, and safe mobility with AD. Pt verbalized understanding      Prognosis: Good  Decision Making: Medium Complexity  OT Education: OT Role;Plan of Care;Energy Conservation;Transfer Training;Precautions; ADL Adaptive Strategies  REQUIRES OT FOLLOW UP: Yes  Activity Tolerance  Activity Tolerance: Patient Tolerated treatment well  Activity Tolerance: /56 HR 84, O2 98%  Safety Devices  Safety Devices in place: Yes  Type of devices: Call light within reach; Chair alarm in place; Left in chair;Nurse notified;Gait belt           Patient Diagnosis(es): The encounter diagnosis was Localized osteoarthritis of left knee.      has a past medical history of Arthritis, CAD (coronary artery disease), Carotid stenosis, History of blood transfusion, Hyperlipidemia, Hypertension, Impaired mobility, Kidney disease, Mitral valve disease, OA (osteoarthritis), Peripheral vascular disease ([address] Thyroid disease, and Urinary frequency. has a past surgical history that includes Tonsillectomy; Elbow surgery; Coronary angioplasty with stent; joint replacement (8/30/11 ); Colonoscopy (2/2012); and Total knee arthroplasty (Left, 7/12/2021).            Restrictions  Restrictions/Precautions  Restrictions/Precautions: Weight Bearing, General Precautions, Fall Risk  Required Braces or Orthoses?: Yes  Lower Extremity Weight Bearing Restrictions  Left Lower Extremity Weight Bearing: Weight Bearing As Tolerated  Required Braces or Orthoses  Left Lower Extremity Brace: Knee Immobilizer  Position Activity Restriction  Other position/activity restrictions: wound vac    Subjective   General  Chart Reviewed: Yes  Patient assessed for rehabilitation services?: Yes  Family / Caregiver Present: No  Referring Practitioner: Regan Dumont PA-C  Diagnosis: LEFT TOTAL KNEE ARTHROPLASTY  Subjective  Subjective: Pt pleasant and agreeable to therapy  General Comment  Comments: RN approved therapy  Pain Assessment  Pain Level: 3     Social/Functional History  Social/Functional History  Lives With: Spouse  Type of Home: House  Home Layout: Two level  Home Access: Ramped entrance  Bathroom Shower/Tub: Tub/Shower unit  Bathroom Toilet: Standard  Home Equipment: Tasspass  ADL Assistance: Needs assistance  Homemaking Responsibilities: No (spouse completes all tasks)  Ambulation Assistance: Needs assistance  Transfer Assistance: Needs assistance (spouse provides max assist bed to chair)  Active : No  Additional Comments: Pt states she has been bed to w/c bound with max assist for transfers for about 6-8 weeks, reports several falls       Objective        Orientation  Overall Orientation Status: Within Functional Limits  Observation/Palpation  Posture: Fair  Balance  Sitting Balance: Stand by assistance (seated EOB)  Standing Balance: Dependent/Total (mod A x2 in STEDU)  Standing Balance  Time: ~10 minutes x2, ~1 minute x3  Activity: seated EOB, standing for transfers in STEDY  Functional Mobility  Functional - Mobility Device: Other  Activity: Other  Assist Level: Dependent/Total  Functional Mobility Comments: mod A x2 for STEDY  ADL  LE Dressing: Dependent/Total (assist with donning slim hose and knee immobilizer)  Toileting: Dependent/Total  Tone RUE  RUE Tone: Normotonic  Tone LUE  LUE Tone: Normotonic  Coordination  Movements Are Fluid And Coordinated: Yes     Bed mobility  Supine to Sit: Maximum assistance  Sit to Supine: Maximum assistance (assist with BLE)  Scooting: Moderate assistance (hips to EOB)  Transfers  Sit to stand: Moderate assistance;2 Person assistance  Stand to sit: 2 Person assistance; Moderate assistance  Transfer Comments: mod A x2 in STEDY due to pain     Cognition  Overall Cognitive Status: Exceptions  Following Commands: Follows one step commands with repetition; Follows one step commands with increased time  Attention Span: Attends with cues to redirect  Safety Judgement: Decreased awareness of need for assistance  Problem Solving: Assistance required to generate solutions;Assistance required to identify errors made  Initiation: Requires cues for some  Sequencing: Requires cues for some                 LUE AROM (degrees)  LUE AROM : WFL  RUE AROM (degrees)  RUE AROM : WFL  LUE Strength  L Hand General: 4/5  RUE Strength  R Hand General: 4/5                   Plan   Plan  Times per week: 4-6x/wk  Current Treatment Recommendations: Balance Training, Functional Mobility Training, Safety Education & Training, Self-Care / ADL      AM-[facility] Score        -[facility] Inpatient Daily Activity Raw Score: 13 (07/13/21 1204)  AM-[facility] Inpatient ADL T-Scale Score : 32.03 (07/13/21

## 2021-07-13 NOTE — CONSULTS
Hospital Medicine  Consult History & Physical        Chief Complaint:  Left knee pain    Date of Service: Pt seen/examined in consultation on 7/13/2021    History Of Present Illness:      81 y.o. female who we are asked to see/evaluate by La Swift MD for medical management of CAD, HTN, CKD. The patient reports a several year hx of progressive left knee pain 2nd to OA that is rated 10/10, worse with ambulation and relieved to some degree by rest and analgesic medication. This has progressed to the point that the analgesic meds are minimally effective and the patient decided to undergo an elective left total knee arthroplasty. The patient reports good post-operative pain control w/out associated fevers/chills or other signs/sxs of systemic illness. The patient denies any associated cardiopulmonary c/o such as CP/SOB. Denies any significant post-op N/V. She is having issues with urinary retention. Her Cr was elevated this AM.      Past Medical History:        Diagnosis Date    Arthritis     CAD (coronary artery disease)     MI    Carotid stenosis     History of blood transfusion     s/p Rt TKR    Hyperlipidemia     Hypertension     Impaired mobility     recent falls - uses W/C    Kidney disease     stage 3 kidney disease    Mitral valve disease     and Aortic    OA (osteoarthritis)     Peripheral vascular disease (HCC)     Thyroid disease     Urinary frequency        Past Surgical History:        Procedure Laterality Date    COLONOSCOPY  2/2012    CORONARY ANGIOPLASTY WITH STENT PLACEMENT      2009    ELBOW SURGERY      left    JOINT REPLACEMENT  8/30/11     right total knee replacement    TONSILLECTOMY         Medications Prior to Admission:    Prior to Admission medications    Medication Sig Start Date End Date Taking?  Authorizing Provider   levothyroxine (SYNTHROID) 50 MCG tablet Take 50 mcg by mouth Daily  2/27/19  Yes Historical Provider, MD   therapeutic multivitamin-minerals [facility]) tablet Take 1 tablet by mouth daily. Yes Historical Provider, MD   fenofibrate (TRIGLIDE) 160 MG tablet Take 160 mg by mouth daily. Yes Historical Provider, MD   amlodipine (NORVASC) 5 MG tablet Take 5 mg by mouth 2 times daily. Yes Historical Provider, MD   metoprolol (LOPRESSOR) 25 MG tablet Take 25 mg by mouth 2 times daily. Yes Historical Provider, MD   lisinopril-hydrochlorothiazide (PRINZIDE;ZESTORETIC) 20-12.5 MG per tablet Take 1 tablet by mouth daily. Yes Historical Provider, MD   atorvastatin (LIPITOR) 40 MG tablet Take 40 mg by mouth nightly. Yes Historical Provider, MD   aspirin 81 MG chewable tablet Take 81 mg by mouth nightly     Historical Provider, MD   Cholecalciferol (VITAMIN D3) 2000 UNIT CAPS Take 2,000 Units by mouth daily. Historical Provider, MD       Allergies:  Patient has no known allergies. Social History:      The patient currently lives with family. TOBACCO:   reports that she has quit smoking. She has never used smokeless tobacco.  ETOH:   reports current alcohol use. Family History:     Reviewed in detail. Positive as follows:        Problem Relation Age of Onset    Heart Disease Mother     Cancer Mother         ovarian    Cancer Father         luekemia       REVIEW OF SYSTEMS COMPLETED:   Pertinent positives as noted in the HPI. All other systems reviewed and negative. PHYSICAL EXAM PERFORMED:  /67   Pulse 75   Temp 98.4 °F (36.9 °C) (Oral)   Resp 16   Ht 5' 4\" (1.626 m)   Wt 169 lb (76.7 kg)   SpO2 95%   BMI 29.01 kg/m²   General appearance: No apparent distress, appears stated age and cooperative. HEENT: Normal cephalic, atraumatic without obvious deformity. Pupils equal, round, and reactive to light. Extra ocular muscles intact. Conjunctivae/corneas clear. Neck: Supple, with full range of motion. No jugular venous distention. Trachea midline. Respiratory:  Normal respiratory effort.  Clear to auscultation, bilaterally without Rales/Wheezes/Rhonchi. Cardiovascular: Regular rate and rhythm with normal S1/S2 without rubs or gallops. 3/6 systolic murmur. Abdomen: Soft, non-tender, non-distended with normal bowel sounds. Musculoskeletal: Decreased ROM LLE. Knee immobilizer in place. Dsg in place. No clubbing, cyanosis or edema bilaterally. Skin: Skin color, texture, turgor normal.  No rashes or lesions. Neurologic:  Neurovascularly intact without any focal sensory/motor deficits. Cranial nerves: II-XII intact, grossly non-focal.  Psychiatric: Alert and oriented, thought content appropriate, normal insight  Capillary Refill: Brisk,3 seconds, normal   Peripheral Pulses: +2 palpable, equal bilaterally     Labs:     Recent Labs     07/13/21  0752   WBC 9.9   HGB 7.0*   HCT 20.9*        Recent Labs     07/12/21  1040 07/13/21  0752    134*   K 4.4 4.6    102   CO2 23 21   BUN 42* 50*   CREATININE 1.0 1.6*   CALCIUM 10.4 9.0     No results for input(s): AST, ALT, BILIDIR, BILITOT, ALKPHOS in the last 72 hours. No results for input(s): INR in the last 72 hours. No results for input(s): [place] in the last 72 hours. Urinalysis:    Lab Results   Component Value Date    NITRU Negative 06/24/2021    WBCUA Rare 08/18/2011    BACTERIA 1+ 08/18/2011    RBCUA Rare 08/18/2011    BLOODU Negative 06/24/2021    SPECGRAV >=1.030 06/24/2021    GLUCOSEU Negative 06/24/2021    GLUCOSEU NEGATIVE 08/18/2011       Radiology: I have reviewed the radiology reports with the following interpretation:     XR KNEE LEFT (1-2 VIEWS)   Final Result   Status post knee replacement, in normal alignment. No acute fracture. Immediate postoperative changes. ASSESSMENT:    Active Hospital Problems    Diagnosis Date Noted    Primary localized osteoarthritis of left knee [M17.12] 07/12/2021       PLAN:    Left knee OA - s/p left total knee arthroplasty 7/12.  - management per ortho.   - pain meds, DVT ppx, PT/OT evals. Acute blood loss anemia   - expected due to surgical losses. Hgb 7.  - transfuse 1 unit of pRBC given MARGY on 7/13.  - follow CBC. MARGY on CKD - likely due to volume losses. - hold home lisinopril and HCTZ. - follow BMP. - avoid nephrotoxins and hypotension. CAD/HTN - controlled. - holding home ACEi, HCTZ. - continue home amlodipine, Toprol with hold parameters. - continue home ASA, statin. Hypothyroidism - continue home levothyroxine. Urinary retention  - likely due to immobility and anesthesia. - continue straight cath q8 prn. IF need to straight cath x 2 more, then may place velazquez catheter. DVT Prophylaxis: SCDs - per ortho  Diet: ADULT DIET;  Regular  Code Status: Full Code    PT/OT Eval Status: Active and ongoing    Dispo - per ortho - likely another 1-2 days inpatient    Thank you for the consultation, will follow up as needed    Electronically signed by LINDSAY Nesbitt CNP on 7/13/21 at 9:26 AM EDT

## 2021-07-14 PROBLEM — Z96.652 STATUS POST TOTAL KNEE REPLACEMENT, LEFT: Status: ACTIVE | Noted: 2021-01-01

## 2021-07-14 NOTE — PROGRESS NOTES
Physical Therapy  Facility/Department: [facility] C5 - MED SURG/ORTHO  Daily Treatment Note  NAME: Le Guerin  : 1935  MRN: [id number]    Date of Service: 2021    Discharge Recommendations:  Subacute/Skilled Nursing Facility   PT Equipment Recommendations  Equipment Needed: No    Assessment   Body structures, Functions, Activity limitations: Decreased functional mobility ; Decreased endurance;Decreased strength;Decreased posture;Decreased ROM; Increased pain  Assessment: Pt demos dependent total max A for bed mobility and for sit to stand transfers. Pt unable to stand more than 30\" in a walker d/t weakness and poor flexibility. Pt required mod A x 2 and a martha stedy transfer device to move from bed to the chair. Pt will benefit from skilled PT to address post op deficits. PT recommend SNF at discharge to maximize function and minimize fall risk and burden of care. Treatment Diagnosis: weakness, decreased ROM, decreased mobility  Prognosis: Good  Decision Making: Medium Complexity  Patient Education: Disease Specific: pt educated in use of immobilizer, HEP post TKR, transfers. She voices understanding  Barriers to Learning: none  REQUIRES PT FOLLOW UP: Yes  Activity Tolerance  Activity Tolerance: Patient Tolerated treatment well  Activity Tolerance: /76  HR 79  O2 94% RA     Patient Diagnosis(es): The encounter diagnosis was Localized osteoarthritis of left knee. has a past medical history of Arthritis, CAD (coronary artery disease), Carotid stenosis, History of blood transfusion, Hyperlipidemia, Hypertension, Impaired mobility, Kidney disease, Mitral valve disease, OA (osteoarthritis), Peripheral vascular disease ([facility] [address] Thyroid disease, and Urinary frequency. has a past surgical history that includes Tonsillectomy; Elbow surgery; Coronary angioplasty with stent; joint replacement (11 );  Colonoscopy (2012); and Total knee arthroplasty (Left, 7/12/2021). Restrictions  Restrictions/Precautions  Restrictions/Precautions: Weight Bearing, General Precautions, Fall Risk  Required Braces or Orthoses?: Yes  Lower Extremity Weight Bearing Restrictions  Left Lower Extremity Weight Bearing: Weight Bearing As Tolerated  Required Braces or Orthoses  Left Lower Extremity Brace: Knee Immobilizer  Position Activity Restriction  Other position/activity restrictions: wound vac  Subjective   General  Chart Reviewed: Yes  Response To Previous Treatment: Patient with no complaints from previous session.   Family / Caregiver Present: No  Referring Practitioner: Adriana Vincent  Subjective  Subjective: pt agrees to therapy  General Comment  Comments: RN cleared pt for therapy  Pain Screening  Patient Currently in Pain: Yes  Pain Assessment  Pain Assessment: Faces  Christian-Baker Pain Rating: Hurts a little bit  Vital Signs  Patient Currently in Pain: Yes       Orientation  Orientation  Overall Orientation Status: Within Normal Limits  Cognition      Objective   Bed mobility  Supine to Sit: Maximum assistance  Transfers  Sit to Stand:  (mod 2 s stedy, max A for walker)  Stand to sit: Moderate Assistance;2 Person Assistance  Bed to Chair: Dependent/Total (s stedy)  Ambulation  Ambulation?: No (d/t poor standing ability/balance)     Balance  Posture: Poor  Sitting - Static: Good;-  Sitting - Dynamic: Fair;+  Standing - Static: Poor (s stedy)  Exercises  Straight Leg Raise: \"bent leg raise\" d/t decreased Knee extension 10 L  Quad Sets: 15 x B max cues  Gluteal Sets: 15 x B  Hip Abduction: 10 x B assisted  Knee Short Arc Quad: 10 x LLE max assist  Knee Passive Range of Motion: R knee 13* to 96*  Ankle Pumps: 15 B  Other exercises  Other exercises?: Yes  Other exercises 1: AAROM L knee slides sitting x 10'  Other exercises 2: gravity assisted L knee stretch into extension 30\" x 3         Comment: Practiced sitting scooting to EOB (limited d/t poor BUE strength), practiced sit EOB to stand to

## 2021-07-14 NOTE — PROGRESS NOTES
Hospitalist Progress Note      PCP: Deysi Sutton MD    Date of Admission: 7/12/2021    Chief Complaint: Left knee pain    Hospital Course: 81 y.o. female who we are asked to see/evaluate by Germaine Schulte MD for medical management of CAD, HTN, CKD. Subjective: Still with pain. Cr up a bit this AM.       Medications:  Reviewed    Infusion Medications    sodium chloride      dextrose      lactated ringers 125 mL/hr at 07/13/21 1953    sodium chloride       Scheduled Medications    apixaban  2.5 mg Oral BID    amLODIPine  5 mg Oral BID    atorvastatin  40 mg Oral Nightly    Vitamin D  2,000 Units Oral Daily    fenofibrate  160 mg Oral Daily    levothyroxine  50 mcg Oral Daily    metoprolol tartrate  25 mg Oral BID    therapeutic multivitamin-minerals  1 tablet Oral Daily    insulin glargine  0.25 Units/kg Subcutaneous Nightly    insulin lispro  0.08 Units/kg Subcutaneous TID WC    insulin lispro  0-6 Units Subcutaneous TID WC    insulin lispro  0-3 Units Subcutaneous Nightly    sodium chloride flush  10 mL Intravenous 2 times per day    acetaminophen  650 mg Oral Q6H    sennosides-docusate sodium  1 tablet Oral BID    [Held by provider] lisinopril  20 mg Oral Daily    And    [Held by provider] hydroCHLOROthiazide  12.5 mg Oral Daily     PRN Meds: sodium chloride, glucose, dextrose, glucagon (rDNA), dextrose, sodium chloride flush, sodium chloride, oxyCODONE **OR** oxyCODONE, morphine **OR** morphine, magnesium hydroxide, ondansetron **OR** ondansetron      Intake/Output Summary (Last 24 hours) at 7/14/2021 0929  Last data filed at 7/14/2021 0851  Gross per 24 hour   Intake 1357.96 ml   Output --   Net 1357.96 ml       Physical Exam Performed:    BP (!) 153/76   Pulse 79   Temp 99 °F (37.2 °C) (Oral)   Resp 18   Ht 5' 4\" (1.626 m)   Wt 169 lb (76.7 kg)   SpO2 97%   BMI 29.01 kg/m²     General appearance: No apparent distress, appears stated age and cooperative.   HEENT: Pupils equal, round, and reactive to light. Conjunctivae/corneas clear. Neck: Supple, with full range of motion. No jugular venous distention. Trachea midline. Respiratory:  Normal respiratory effort. Clear to auscultation, bilaterally without Rales/Wheezes/Rhonchi. Cardiovascular: Regular rate and rhythm with normal S1/S2 without murmurs, rubs or gallops. Abdomen: Soft, non-tender, non-distended with normal bowel sounds. Musculoskeletal: No clubbing, cyanosis or edema bilaterally. Full range of motion without deformity. Skin: Skin color, texture, turgor normal.  No rashes or lesions. Neurologic:  Neurovascularly intact without any focal sensory/motor deficits. Cranial nerves: II-XII intact, grossly non-focal.  Psychiatric: Alert and oriented, thought content appropriate, normal insight  Capillary Refill: Brisk,3 seconds, normal   Peripheral Pulses: +2 palpable, equal bilaterally       Labs:   Recent Labs     07/13/21  0752 07/13/21 2029 07/14/21  0624   WBC 9.9  --  12.0*   HGB 7.0* 9.3* 9.1*   HCT 20.9* 27.6* 26.9*     --  159     Recent Labs     07/12/21  1040 07/13/21  0752 07/14/21  0624    134* 134*   K 4.4 4.6 4.5    102 102   CO2 23 21 23   BUN 42* 50* 54*   CREATININE 1.0 1.6* 1.8*   CALCIUM 10.4 9.0 8.9     No results for input(s): AST, ALT, BILIDIR, BILITOT, ALKPHOS in the last 72 hours. No results for input(s): INR in the last 72 hours. No results for input(s): [place] in the last 72 hours. Urinalysis:      Lab Results   Component Value Date    NITRU Negative 06/24/2021    WBCUA Rare 08/18/2011    BACTERIA 1+ 08/18/2011    RBCUA Rare 08/18/2011    BLOODU Negative 06/24/2021    SPECGRAV >=1.030 06/24/2021    GLUCOSEU Negative 06/24/2021    GLUCOSEU NEGATIVE 08/18/2011       Radiology:  XR KNEE LEFT (1-2 VIEWS)   Final Result   Status post knee replacement, in normal alignment. No acute fracture. Immediate postoperative changes. Assessment/Plan:    Active Hospital Problems    Diagnosis     Primary localized osteoarthritis of left knee [M17.12]      Left knee OA - s/p left total knee arthroplasty 7/12.  - management per ortho. - pain meds, DVT ppx, PT/OT evals.     Acute blood loss anemia   - expected due to surgical losses. Hgb 7.  - transfused 1 unit of pRBC given MARGY on 7/13.  - follow CBC.     MARGY on CKD - likely due to volume losses. - hold home lisinopril and HCTZ. - follow BMP. - avoid nephrotoxins and hypotension.     CAD/HTN - controlled. - holding home ACEi, HCTZ. - continue home amlodipine, Toprol with hold parameters. - continue home ASA, statin.     Hypothyroidism - continue home levothyroxine.     Urinary retention  - likely due to immobility and anesthesia. - continue straight cath q8 prn. IF need to straight cath x 2 more, then may place velazquez catheter. - PVR ordered.       DVT Prophylaxis: SCDs per ortho  Diet: ADULT DIET;  Regular  Code Status: Full Code    PT/OT Eval Status: recommend SNF    Dispo - per ortho - would prefer she stay inpatient one more day given increase in [address], APRN - CNP

## 2021-07-14 NOTE — PLAN OF CARE
Pt's pain relieved with scheduled Tylenol. Rated 1/10 scale one hour with improvement. Declined further pain intervention.

## 2021-07-14 NOTE — PROGRESS NOTES
Message to hospitalist: fyi CNA called me and let me know pt voided some in toilet. Bladder scanned pt and it was >999. Spoke with pt and she says it feels like she has to urinate still but wanted to wait. Therapy got pt up to chair about 30 min ago so in 30 min before getting back to bed going to make pt try to void. If residual still high do you want me to straight cath or place velazquez?  looks like they straight cathd her on 7/13. thanks.

## 2021-07-14 NOTE — PROGRESS NOTES
Occupational Therapy  Facility/Department: [facility] C5 - MED SURG/ORTHO  Daily Treatment Note  NAME: Portillo Hall  : 1935  MRN: [id number]    Date of Service: 2021    Discharge Recommendations:  Subacute/Skilled Nursing Facility  OT Equipment Recommendations  Other: defer    Assessment   Performance deficits / Impairments: Decreased functional mobility ; Decreased safe awareness;Decreased balance;Decreased ADL status; Decreased endurance;Decreased strength;Decreased high-level IADLs      Assessment: Pt pleasant and agreeable to therapy. Pt continues to need Ax2 for standing with RW to complete clothing management for toileting. Pt required total A for sit pivot transfer and unable to get standing with RW. Pt continues to need increased assist for all adls and is not safe to return home at this time. Cont with POC. Prognosis: Good  OT Education: OT Role;Plan of Care;Energy Conservation;Transfer Training;Precautions; ADL Adaptive Strategies  REQUIRES OT FOLLOW UP: Yes  Activity Tolerance  Activity Tolerance: Patient Tolerated treatment well  Activity Tolerance: /63 HR 72 O2 98%  Safety Devices  Safety Devices in place: Yes  Type of devices: Call light within reach; Chair alarm in place; Left in chair;Nurse notified;Gait belt (up with PT at end of session)         Patient Diagnosis(es): The primary encounter diagnosis was Localized osteoarthritis of left knee. A diagnosis of Status post total knee replacement, left was also pertinent to this visit. has a past medical history of Arthritis, CAD (coronary artery disease), Carotid stenosis, History of blood transfusion, Hyperlipidemia, Hypertension, Impaired mobility, Kidney disease, Mitral valve disease, OA (osteoarthritis), Peripheral vascular disease ([address] Thyroid disease, and Urinary frequency. has a past surgical history that includes Tonsillectomy; Elbow surgery; Coronary angioplasty with stent; joint replacement (11 );  Colonoscopy (2/2012); and Total knee arthroplasty (Left, 7/12/2021). Restrictions  Restrictions/Precautions  Restrictions/Precautions: Weight Bearing, General Precautions, Fall Risk  Required Braces or Orthoses?: Yes  Lower Extremity Weight Bearing Restrictions  Left Lower Extremity Weight Bearing: Weight Bearing As Tolerated  Required Braces or Orthoses  Left Lower Extremity Brace: Knee Immobilizer  Position Activity Restriction  Other position/activity restrictions: wound vac       Subjective   General  Chart Reviewed: Yes  Patient assessed for rehabilitation services?: Yes  Family / Caregiver Present: No  Referring Practitioner: Laura Coughlin PA-C  Diagnosis: LEFT TOTAL KNEE ARTHROPLASTY  Subjective  Subjective: Pt pleasant and agreeable to therapy  General Comment  Comments: RN approved therapy  Pain Assessment  Pain Assessment: Faces  Christian-Baker Pain Rating: Hurts little more  Pain Type: Surgical pain;Chronic pain  Pain Location: Knee  Pain Orientation: Left  Non-Pharmaceutical Pain Intervention(s): Repositioned  Vital Signs  Patient Currently in Pain: Yes       Orientation  Orientation  Overall Orientation Status: Within Functional Limits       Objective    ADL  Grooming: Stand by assistance (seated)  LE Dressing: Dependent/Total  Toileting:  Moderate assistance (assist with brief management)        Balance  Sitting Balance: Stand by assistance  Standing Balance: Dependent/Total (mod-max A x2 with RW)  Standing Balance  Time: ~30 seconds x3  Activity: transfers, standing for clothing management  Toilet Transfers  Toilet - Technique: Stand pivot  Equipment Used: Standard bedside commode  Toilet Transfer: 2 Person assistance;Maximum assistance  Bed mobility  Supine to Sit: Moderate assistance (increased time)  Sit to Supine: Unable to assess (up in chair at end of session)  Scooting: Minimal assistance (increased time, cues for hand positioning)  Transfers  Stand Pivot Transfers: 2 Person assistance;Maximum assistance  Sit to stand: Moderate assistance;2 Person assistance  Stand to sit: 2 Person assistance; Moderate assistance                       Cognition  Overall Cognitive Status: Exceptions  Following Commands: Follows one step commands with repetition; Follows one step commands with increased time  Attention Span: Attends with cues to redirect  Safety Judgement: Decreased awareness of need for assistance  Problem Solving: Assistance required to generate solutions;Assistance required to identify errors made  Initiation: Requires cues for some  Sequencing: Requires cues for some                                         Plan   Plan  Times per week: 4-6x/wk  Current Treatment Recommendations: Balance Training, Functional Mobility Training, Safety Education & Training, Self-Care / ADL    AM-PAC Score        AM-[facility] Inpatient Daily Activity Raw Score: 13 (07/14/21 1547)  AM-PAC Inpatient ADL T-Scale Score : 32.03 (07/14/21 1547)  ADL Inpatient CMS 0-100% Score: 63.03 (07/14/21 1547)  ADL Inpatient CMS G-Code Modifier : CL (07/14/21 1547)    Goals  Short term goals  Time Frame for Short term goals: 1 week (7/20/21)  Short term goal 1: Pt will complete toilet transfer with min A. -ongoing max A x2 7/14  Short term goal 2: Pt will complete LB dressing with AE prn. Short term goal 3: Pt will complete 15 reps of BUE exercises for increased endurance and strength. Short term goal 4: Pt will verbalize understanding of safe car transfer. (7/17/21)  Patient Goals   Patient goals : \"to be able to walk to the toilet before I go home\"       Therapy Time   Individual Concurrent Group Co-treatment   Time In 1341         Time Out 1351         Minutes 10         Timed Code Treatment Minutes: 1 [facility], OTR/L    If pt is unable to be seen after this session, please let this note serve as discharge summary. Please see case management note for discharge disposition. Thank you.

## 2021-07-14 NOTE — PROGRESS NOTES
Department of Orthopedic Surgery  Nurse Practitioner   Progress Note    Subjective:     Post-Operative Day: 2 Status Post left Total Knee Arthroplasty  Systemic or Specific Complaints: Patient reports feeling \"cramped up\" while resting in bed.  at bedside. Objective:     Patient Vitals for the past 24 hrs:   BP Temp Temp src Pulse Resp SpO2   07/14/21 1115 (!) 151/66 98 °F (36.7 °C) Oral 69 17 97 %   07/14/21 0815 (!) 153/76 99 °F (37.2 °C) Oral 79 18 97 %   07/13/21 2317 121/65 98.7 °F (37.1 °C) Oral 88 18 95 %   07/13/21 2032 (!) 143/71 97.9 °F (36.6 °C) Oral 100 18 98 %   07/13/21 1845 131/67 97.8 °F (36.6 °C) Oral 88 16 97 %   07/13/21 1745 132/62 98 °F (36.7 °C) Oral 90 16 97 %   07/13/21 1645 129/63 97.8 °F (36.6 °C) Oral 91 17 97 %   07/13/21 1555 130/68 97.8 °F (36.6 °C) Oral 86 16 97 %   07/13/21 1510 (!) 142/62 98 °F (36.7 °C) Oral 81 16 99 %   07/13/21 1505 137/65 97.8 °F (36.6 °C) Oral 80 17 99 %   07/13/21 1500 (!) 123/59 97.8 °F (36.6 °C) Oral 82 16 98 %   07/13/21 1450 111/66 -- -- 99 -- 99 %   07/13/21 1449 113/62 -- -- 116 -- 91 %   07/13/21 1447 (!) 94/38 97.8 °F (36.6 °C) Oral 79 16 95 %       General: alert, appears stated age, cooperative and no distress   Wound: Wound clean and dry no evidence of infection. ACE, KI   Motion: Painful range of Motion   DVT Exam: No evidence of DVT seen on physical exam.       Knee swollen but thigh soft to palpation. Moving foot and ankle. Good distal pulses. Data Review  CBC:   Lab Results   Component Value Date    WBC 12.0 07/14/2021    RBC 3.09 07/14/2021    HGB 9.1 07/14/2021    HCT 26.9 07/14/2021     07/14/2021       Assessment:     Status Post left Total Knee Arthroplasty. Doing well postoperatively. Plan:      1: Continues current post-op course : Post-op labs reviewed. Acute blood loss anemia noted, 1 unit PRBC transfused yesterday. IH&H improved and stable. MARGY on CKD noted. Cr worsened overnight.  Monitor, avoid nephrotoxins. Holding lisinopril and HCTA per medicine. 2:  Continue Deep venous thrombosis prophylaxis - asa 81 mg BID  3:  Continue physical therapy, OT, WBAT  4:  Continue Pain Control  5:  The patient was able to go from a wheelchair to bed for a few months prior to surgery. We anticipate this patient to return to this baseline at discharge, so we plan for home with Carlos Albertogiselavini Irasema and assistance from family as she had prior to surgery. I discussed this with the patient and her family at length, along with Dr. Kari Hand, [facility]. All in agreement. Discharge pending improvement in MARGY after discussion with medicine. Scripts on chart. Discharge instructions completed, will need PIPE completed and signed.      LINDASY Fajardo - CNP

## 2021-07-14 NOTE — PROGRESS NOTES
Pt urinated in toilet. Bladder scanned to check for void residual. Showed >566ml. Lerma placed, urine return. No complications.

## 2021-07-14 NOTE — CARE COORDINATION
CM left SNF list at bedside, pt likely will dc home. Although therapy recs SNF, pt has DME and 24/7 care and [employer] Following and will front load. If pt changes her mind no precert needed for SNF placement.  CM following-Sarahy Herzog RN

## 2021-07-14 NOTE — OP NOTE
Orthopaedic Surgery  Operative Report      Patient Name:  Kameron Miller  Patient :  1935  MRN: [id number]    Date: 21     Pre-operative Diagnosis:   M17.12 Primary Osteoarthritis   M23.92 Internal derangement of left knee unspecified     Post-operative Diagnosis:    Same     Procedure: LEFT   Total Knee Arthroplasty   Modifier 22 -significantly increased complexity (approximately 100% additional time)    Surgeon:  Nickie Ward) and Role:     * Ramna Resendiz MD - Primary    Assistant:   Physician Assistant (First assistant): Jayden Camacho PA-C  Circulator: Orin Phelps RN  Surgical Assistant: Luis Enrique Ozuna  Scrub Person First: Roosevelt Kay    Anesthesia: General endotracheal anesthesia, Intraoperative local infiltration - Exparel/marcaine solution and Regional with adductor canal block    Estimated blood loss: 300    Specimens: * No specimens in log *    Complications: None    Drains: None    Condition: Stable    Implants:   Implant Name Type Inv.  Item Serial No.  Lot No. LRB No. Used Action   CEMENT BNE 40 GM RADIOPAQUE BA SIMPLEX P Cement CEMENT BNE 40 GM RADIOPAQUE BA SIMPLEX P  [employer] [facility] [id number] Left 2 Implanted   CEMENT BNE 40 GM RADIOPAQUE BA SIMPLEX P Cement CEMENT BNE 40 GM RADIOPAQUE BA SIMPLEX P  [employer] [facility] [id number] Left 1 Implanted   LEGION TIB CONE ID 20 SHT  LEGION TIB CONE ID 20 SHT  [facility] [id number] Left 1 Implanted   Medacta Tibial Tray Fixed Hinge    [employer] [facility] [id number] Left 1 Implanted   COMPONENT PAT SZ 2 RESURF AUG GMK  COMPONENT PAT SZ 2 RESURF AUG GMK  [employer] [facility] [id number] Left 1 Implanted   STEM EXTN L65MM [id number] KNEE [place] GMK  STEM EXTN L65MM [id number] KNEE [place] GMK  [employer] [facility] [id number] Left 1 Implanted   Medacta Femoral Component Hinge    [employer] [facility] [id number] Left 1 Implanted   STEM EXTN L65MM [id number] KNEE [place] GMK  STEM EXTN L65MM [id number] KNEE [place] GMK  [employer] [facility] [id number] Left 1 Implanted   Medacta Hinge Tibial Insert Fixed    [employer]ACT[employer] [facility] [id number] Left 1 Implanted       Findings:   - End stage OA  - Stiff valgus gonarthrosis  - Incompetent medial collateral ligament  - Markedly osteoporotic bone  -Preop flexion contracture 30 to 120 degrees     Indications: The patient has been battling left knee pain for years. Pain has gotten worse recently. Patient has failed all preoperative conservative treatment options. The activities of daily living have been affected quite a bit. Patient wanted to regain mobility and be active as possible. Patient understood the risk benefits and alternatives in detail and wanted to proceed with the above operation.     She had a previous right knee replacement performed for valgus disease, but not as severe as this. She had a difficult time regaining motion there afterwards. Therefore, she was reluctant to proceed with the left side. I had a discussion regarding her likely collateral ligament deficiency. She has fairly significant internal derangement present and subluxation and the clinical finding of a stiff valgus knee. I advised to her that we would plan for a primary knee replacement with standard techniques, but will have hinged knee replacement on standby. I counseled her that a hinged knee replacement tends to have a more predictable return to range of motion than the standard one. Although the eventual outcome data is quite good, there is a risk that there is early loosening or failure of the hinged knee replacement due to its inherent constraint. In addition, I had an extended discussion with her primary care physician. He identified a large lower abdominal mass concerning for malignancy. Her mobility has been horribly poor leading up to this. She is in desperate need for pain relief and mobility for her knee before rehabbing this cancer.   I had a discussion whether to delay this operation until later point until the cancer is treated, he felt strongly otherwise as well and so to the patient. We discussed DVT prophylaxis medication adjustments. She and her family understood the significantly higher risks and wanted to proceed with the surgery.     Procedure Details:   I marked the surgical site of the left knee for surgery. [facility] was taken back to the operating theater laid supine the table the bony prominences well-padded.  General anesthesia was induced.  We transferred the patient to the operating table supine.  The left leg was prepped and draped in standard sterile fashion. An appropriate leg positioner,DeMayo , was used to stabilize the extremity.     Antibiotics 2 g of Ancef were given. Tourniquet was only used through the cementing portion of the case. Overall time was 30 minutes.     We began with an anterior approach to the knee. Thick soft tissue flaps were developed. Medial parapatellar arthrotomy was extended through the fascial tissue. Underlying synovial fluid was evacuated. Small synovectomy was performed just superior to the distal femoral cartilage. Part of the infrapatellar fat pad was removed for exposure. A controlled lateral release just lateral to the patella bone was created for exposure. Patella was then everted and the knee flexed. 2 Hohmann type retractors were then placed to deliver the distal femur.     Medacta Patient specific instrumentation:  I had plan for mechanical alignment rather than kinematic alignment in the preoperative custom cutting block planning. I had a high suspicion of collateral ligament insufficiency and the need for a hinged knee replacement primarily.     Electro Bovie cautery was then used to remove any existing cartilage tissue away from the footprints of the 3 dimensionally printed guides. This custom cutting block was then opposed to the distal femur and fixed with 3 threaded pins. Another threaded pin was then used to yordan rotation through this guide.   The resection levels were then double confirmed using stacey wing, both medial and lateral.  An oscillating saw blade was then used to create a distal femoral resection. The resections were then measured and the appropriate thicknesses were then achieved. This recreated our planned resection level based on her patient specific CT-based guide preoperatively. The rotational pins were then inserted. 4-in-1 cutting block was then placed on the distal femoral bone. Appropriate rotation was then achieved. 2 screws then stabilized this block in addition. An oscillating saw blade then removed the posterior resections. The remaining 3 cuts were then made. The notch tissue was then cleaned out and the edges of the femoral resections were then cleaned using a rongeur and electrocautery Bovie. Attention was then turned toward tibial exposure. The same was performed for the tibia as the femur. Existing cartilage tissue was then removed using electrocautery away from the footprints. A custom cutting block was then opposed to the proximal tibia and fixed with 3 threaded pins. Overall alignment restoration was then confirmed using a drop srinivasan. This was designed in a way to make this parallel to the tibial shaft axis as planned per resection angle varus valgus, and slope. The resection levels were then triple confirmed using stacey wing, both medial and lateral.  An oscillating saw blade was then used to create the proximal tibial resection. This resection was also measured and the appropriate thickness was also restored.     Lamina spreaders were then used to deliver the meniscal tissue to separate the distal femur and the proximal tibia. Electro Bovie cautery was then used to remove the meniscal remnants. Curved osteotome removed any posterior osteophytes. Resections were then cleaned up and ready for trial.     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   This was further confirmed by flexion extension block assessment. She had a significant flexion contracture. She had only minimal space residual present within the distal femur to proximal tibia space even after the resected surfaces were taken away. Therefore, an additional 6 mm had to be removed off the distal femur to raise the joint line intentionally to even come close to balancing the knee. Despite best attempts of balancing the knee in extension versus flexion, a complete MCL deficiency yielded a significantly lax medial compartment. This is despite resecting the popliteus tendon.     Therefore, I then made the decision to proceed with hinged knee replacement. I performed a more extensive lateral release, the iliotibial band distally in the tibia, and the lateral collateral ligament as well as the popliteus insertion. Posterior capsule was also incised to mobilize the distal femur. I used the distal femoral resection to cut my hinged femoral component box. Appropriate alignment was used with the appropriately sized reamers to couple this to the box itself. Significant osteoporosis in the medial femoral condyle was noted here. This is likely due to chronic disuse on the medial side due to her alignment.     I then performed the same technique on the tibial side and coupled the intramedullary reamer to the existing 0 degree, 0 slope resection. Keel punch was inserted. There was significant proximal tibial osteopenia present. I then placed a proximal tibial Smith & Nephew cone. Standard preparation was performed. I placed size A tibial cone into the defect below the resection level with good apposition to host bone. I chose a tibial cone here to prevent zone 2 failure in the setting of a highly constrained implant as well as significant osteoporosis.     After the real cone was inserted, I trialed the hinged knee replacement using just the CR polyethylene.   She had great extension and range of motion.      The patella was then exposed. 2 point-to-point tenaculums then delivered and stabilized the patella. Appropriate resection freehand was then conducted of the patella. Thickness was confirmed pre and post.  A minimum of 12-14 mm was retained of existing bone. Size 2patella was then trialed. The knee was taken through range of motion with all trial components in. The patella tracked midline in the femoral trochlea. The joint line seem to be restored relative to the epicondylar axis.     All trial components were then removed. Third-generation cement technique was then employed. Tourniquet was then insufflated. The back surfaces of the implants were coated immediately after cement was mixed and ready. The cut bone surfaces were also washed and dried until cancellous bone was visualized.       2 cement restrictors were used on either canal.  Low viscosity Simplex cement was used to impact the components into position. A single batch of 3 segments were then mixed using third-generation cementing technique. Trial liner was inserted initially. The patella was then cemented using standard technique. Trial polyethylene liner was then removed and switched to the size 12 mm hinged poly-. This is a rotating hinge platform with a drop-down dowel within the inner core of the tibia. All excess cement was removed away from the host bone and away from the hinge mechanism. This was held in position for a minimum of 15 minutes and until the cement had hardened. Tourniquet was then let down. Hemostasis was then achieved again. The stability and unrestricted motion of the knee was then confirmed once more.      We performed sequential closure. I irrigated the wound thoroughly with 3 L normal saline. I placed 2 g of vancomycin powder around the wound. I also injected a mixture of Marcaine and Exparel into the perioperative field. Adequate hemostasis was achieved. #2 Ethibond approximated the capsular tissue.   #2 Quill suture approximated the fascial layer and medial parapatellar arthrotomy. 2-0 Vicryl interrupted sutures closed the subcutaneous tissue layer.    Monocryl subcuticular suture was then applied.  Dermabond Prineo was then used with a nonadhesive dressing.  Patient then was reversed in anesthesia, transferred back the postoperative care unit without any complications.    All instrument counts were correct x2.  I was present throughout the entire to the case with the exception of skin closure. The decision to proceed with hinged replacement versus condylar constrained knee replacement was multifold. First, the patient has a very low life expectancy with a newly found lower abdominal malignancy, as advised by the primary care physician. In addition, she is looking for the fastest way to recover, and best restoration of motion. A mid constrained option would have been an option. However, with a significant difference between the extension and flexion gaps considering her pre-existing contracture, despite raising the joint line 6 mm compared to where I would normally accept this, there was still residual imbalance. It was my feeling that with the near complete loss of the MCL functionality, even the condylar constrained prosthesis would not adjust for the flexion instability that would ensue from a smaller polyethylene and the extension gap, not to mention the varus valgus instability itself. Therefore, hinged replacement was used.     Modifier 22: Significantly increased time and complexity:  There are several factors here that accounted for the inherent increased time and complexity. First, she has significantly worsened contracture and arthritis than the standard knee that is replaced.   She has an alignment issue with inherent deficiency of her collateral ligament requiring extensive preparation preoperatively as well as instrumentation and implantation intraoperatively and conversion to a hinged knee replacement primarily. Approximately 100% extra time was taken on this knee compared to any other standard knee. This, in no way, indicates that there is any negative alteration in the eventual outcome.       PLAN:  - WBAT with assist device  - apixaban 2.5 mg BID  - ambulate postop with PT  - resume home meds, diet  - f/u scheduled with me in 2-3 weeks  - dispo: Admit to inpatient, likely discharge to home with his PT       REHAB: Knee immobilizer only for 2 weeks until quadriceps muscle restores full strength and proprioception, only as prophylactic measure. Patient is encouraged to fully flex and extend his knee when sitting upright and when in bed.       Electronically signed by Molly Calix MD on 7/14/2021 at 5:06 PM

## 2021-07-14 NOTE — PROGRESS NOTES
Physical Therapy  Facility/Department: [facility] C5 - MED SURG/ORTHO  Daily Treatment Note  NAME: Portillo Hall  : 1935  MRN: [id number]    Date of Service: 2021    Discharge Recommendations:  Subacute/Skilled Nursing Facility   PT Equipment Recommendations  Equipment Needed: No    Assessment   Body structures, Functions, Activity limitations: Decreased functional mobility ; Decreased endurance;Decreased strength;Decreased posture;Decreased ROM; Increased pain  Assessment: Pt demos dependent total max A for bed mobility and for sit to stand transfers; max 1 + mod 1 for bed<>chair pivot transfers. Pt unable to stand more than 20\" in a walker d/t grass weakness and poor flexibility. Pt will benefit from skilled PT to address post op deficits. PT recommend SNF at discharge to maximize function and minimize fall risk and burden of care. Treatment Diagnosis: weakness, decreased ROM, decreased mobility  Prognosis: Good  Decision Making: Medium Complexity  Patient Education: Disease Specific: pt educated in use of immobilizer, HEP post TKR, transfers. She voices understanding  Barriers to Learning: none  REQUIRES PT FOLLOW UP: Yes  Activity Tolerance  Activity Tolerance: Patient Tolerated treatment well  Activity Tolerance: /60  HR 72  O2 98% RA     Patient Diagnosis(es): The encounter diagnosis was Localized osteoarthritis of left knee. has a past medical history of Arthritis, CAD (coronary artery disease), Carotid stenosis, History of blood transfusion, Hyperlipidemia, Hypertension, Impaired mobility, Kidney disease, Mitral valve disease, OA (osteoarthritis), Peripheral vascular disease ([facility] [address] Thyroid disease, and Urinary frequency. has a past surgical history that includes Tonsillectomy; Elbow surgery; Coronary angioplasty with stent; joint replacement (11 ); Colonoscopy (2012); and Total knee arthroplasty (Left, 2021).     Restrictions  Restrictions/Precautions  Restrictions/Precautions: Weight Bearing, General Precautions, Fall Risk  Required Braces or Orthoses?: Yes  Lower Extremity Weight Bearing Restrictions  Left Lower Extremity Weight Bearing: Weight Bearing As Tolerated  Required Braces or Orthoses  Left Lower Extremity Brace: Knee Immobilizer  Position Activity Restriction  Other position/activity restrictions: wound vac  Subjective   General  Chart Reviewed: Yes  Response To Previous Treatment: Patient with no complaints from previous session.   Family / Caregiver Present: No  Referring Practitioner: Yue Varela  Subjective  Subjective: pt agrees to therapy  General Comment  Comments: RN cleared pt for therapy  Pain Screening  Patient Currently in Pain: Yes  Pain Assessment  Pain Assessment: Faces  Christian-Baker Pain Rating: Hurts little more  Non-Pharmaceutical Pain Intervention(s): Ambulation/Increased Activity;Repositioned  Vital Signs  Patient Currently in Pain: Yes       Orientation  Orientation  Overall Orientation Status: Within Normal Limits  Cognition      Objective   Bed mobility  Supine to Sit: Maximum assistance  Transfers  Sit to Stand:  (mod 2 s stedy, max A for walker)  Stand to sit: Moderate Assistance;2 Person Assistance  Bed to Chair: Dependent/Total (s stedy)  Squat Pivot Transfers: 2 Person Assistance (max 1 +mod 1)  Comment: Pt unable to stand errect enough to transfer from bed to chair using a walker  Ambulation  Ambulation?: No (d/t poor standing ability/balanc)     Balance  Posture: Poor  Sitting - Static: Good;-  Sitting - Dynamic: Fair;+  Standing - Static: Poor (w/ s stedy)  Exercises  Straight Leg Raise: \"bent leg raise\" d/t decreased Knee extension 10 L  Quad Sets: 15 x B max cues  Gluteal Sets: 15 x B  Hip Flexion: 10 x B  Hip Abduction: 10 x B assisted  Knee Long Arc Quad: 10 x B, w/ decreased extension bilaterally  Knee Short Arc Quad: 10 x LLE max assist  Knee Passive Range of Motion: R knee 12* to 95*  Ankle Pumps: 15 B  Other exercises  Other exercises?: Yes  Other exercises 1: AAROM L knee slides sitting x 10'  Other exercises 2: gravity assisted L knee stretch into extension 30\" x 3         Comment: Pt required max 1+ mod 1 for squat pivot transfer from bed to [facility] and from [facility] to chair. Practiced sitting scooting to EOB (limited d/t poor BUE strength), practiced sit EOB to stand to sw x 3: pt demos post lean and kyphotic posture w/ flexed B hip/kness. Pt unable to come to straight enough posture to stand for more than 30\" w/ mod max assist in RW. AM-PAC Score     AM-PAC Inpatient Mobility without Stair Climbing Raw Score : 8 (07/14/21 1044)  AM-PAC Inpatient without Stair Climbing T-Scale Score : 30.65 (07/14/21 1044)  Mobility Inpatient CMS 0-100% Score: 80.91 (07/14/21 1044)  Mobility Inpatient without Stair CMS G-Code Modifier : CM (07/14/21 1044)       Goals  Short term goals  Time Frame for Short term goals: 1 week ( 7/19) unless otherwise specified  Short term goal 1: pt to perform bed mob with CG  -7/14 dependent max assist  Short term goal 2: pt to perform transfers with min assist of -7/14 for sit<>stand=dependent max assist for walker. For bed<>chair squat pivot transfers w/ max 1 + mod 1  Short term goal 3: pt to amb with walker 10 ft with min assist of 1-2  -7/14 not met  Short term goal 4: pt to participate in LE Ex 12-15 reps by 7/16  -7/14 initiated  Patient Goals   Patient goals : \"to know how to use the knee immobilizer\"- MET    Plan    Plan  Times per week: BID 7 days wk  Times per day: Twice a day  Current Treatment Recommendations: Strengthening, ROM, Functional Mobility Training, Safety Education & Training, Gait Training, Pain Management, Transfer Training, Endurance Training  Safety Devices  Type of devices:  All fall risk precautions in place, Patient at risk for falls, Nurse notified, Call light within reach, Left in chair, Chair alarm in place     Therapy Time   Individual Concurrent Group Co-treatment   Time In 1351         Time Out 1430 Minutes 39         Timed Code Treatment Minutes: [address] [facility]

## 2021-07-15 NOTE — PLAN OF CARE
Problem: Pain:  Goal: Pain level will decrease  Description: Pain level will decrease  Outcome: Ongoing  Goal: Control of acute pain  Description: Control of acute pain  Outcome: Ongoing  Goal: Control of chronic pain  Description: Control of chronic pain  Outcome: Ongoing     Problem: Falls - Risk of:  Goal: Will remain free from falls  Description: Will remain free from falls  Outcome: Ongoing  Goal: Absence of physical injury  Description: Absence of physical injury  Outcome: Ongoing     Problem: Skin Integrity:  Goal: Will show no infection signs and symptoms  Description: Will show no infection signs and symptoms  Outcome: Ongoing  Goal: Absence of new skin breakdown  Description: Absence of new skin breakdown  Outcome: Ongoing     Problem: Discharge Planning:  Goal: Discharged to appropriate level of care  Description: Discharged to appropriate level of care  Outcome: Ongoing     Problem: Mobility - Impaired:  Goal: Mobility will improve  Description: Mobility will improve  Outcome: Ongoing     Problem: Infection - Surgical Site:  Goal: Will show no infection signs and symptoms  Description: Will show no infection signs and symptoms  Outcome: Ongoing     Problem: Pain - Acute:  Goal: Pain level will decrease  Description: Pain level will decrease  Outcome: Ongoing     Problem: Falls - Risk of:  Goal: Will remain free from falls  Description: Will remain free from falls  Outcome: Ongoing     Problem: Skin Integrity:  Goal: Will show no infection signs and symptoms  Description: Will show no infection signs and symptoms  Outcome: Ongoing     Problem: Discharge Planning:  Goal: Discharged to appropriate level of care  Description: Discharged to appropriate level of care  Outcome: Ongoing     Problem: Mobility - Impaired:  Goal: Mobility will improve  Description: Mobility will improve  Outcome: Ongoing     Problem: Infection - Surgical Site:  Goal: Will show no infection signs and symptoms  Description: Will show no infection signs and symptoms  Outcome: Ongoing     Problem: Pain - Acute:  Goal: Pain level will decrease  Description: Pain level will decrease  Outcome: Ongoing

## 2021-07-15 NOTE — PROGRESS NOTES
Department of Orthopedic Surgery  Nurse Practitioner   Progress Note    Subjective:     Post-Operative Day: 3 Status Post left Total Knee Arthroplasty  Systemic or Specific Complaints:  Pt states she just doesn't feel like shes making any progress. She was hoping to be walking by now she states. Objective:     Patient Vitals for the past 24 hrs:   BP Temp Temp src Pulse Resp SpO2   07/15/21 1115 (!) 145/68 -- -- -- -- --   07/15/21 1044 (!) 153/71 -- -- 81 -- 94 %   07/15/21 0809 (!) 178/84 98.6 °F (37 °C) Oral 90 18 96 %   07/14/21 2325 (!) 156/73 98.3 °F (36.8 °C) Oral 85 18 94 %   07/14/21 2011 (!) 149/69 98.4 °F (36.9 °C) Oral 88 18 97 %   07/14/21 1420 118/63 97.6 °F (36.4 °C) Oral 72 -- 98 %       General: alert, appears stated age, cooperative and no distress   Wound: Wound clean and dry no evidence of infection. ACE, KI   Motion: Painful range of Motion   DVT Exam: No evidence of DVT seen on physical exam.       Knee swollen but thigh soft to palpation. Moving foot and ankle. Good distal pulses. Data Review  CBC:   Lab Results   Component Value Date    WBC 13.3 07/15/2021    RBC 3.03 07/15/2021    HGB 8.8 07/15/2021    HCT 26.1 07/15/2021     07/15/2021       Assessment:     Status Post left Total Knee Arthroplasty. Doing well postoperatively. Plan:      1: Continues current post-op course :Had a long discussion with patient about her expectations following surgery including the fact that prior to surgery she was essentially wheechair bound and that I would not expect her to be walking or suddenly strong enough again to resume activities like she was doing several months/year ago. She was disappointed in this and states she really felt that this would have enabled her to be up and moving more quickly. We also had a discussion around her disposition. She had originally planned for home at d/c which I believe would be okay for her given her mobility at home prior to surgery.   Today, however, she states she cannot return home in this condition and her  will not be able to offer 24/7 help for her nor be able to help much when she is there. [facility] came to talk with patient today as well but pt states she does not wish to return home at this time. She has expressed desire to go to SNF for rehab at this time. We will contact her choice for SNF today and see if she is able to be accepted. Will continue with PT while here as well.     2:  Continue Deep venous thrombosis prophylaxis - asa 81 mg BID  3:  Continue physical therapy, OT, WBAT  4:  Continue Pain Control    MERYL Scott

## 2021-07-15 NOTE — PROGRESS NOTES
Physical Therapy  Facility/Department: [place] [id number] - MED SURG/ORTHO  Daily Treatment Note  NAME: Sabrina Rushing  : 1935  MRN: [id number]    Date of Service: 7/15/2021    Discharge Recommendations:  Subacute/Skilled Nursing Facility   PT Equipment Recommendations  Equipment Needed: No    Assessment   Body structures, Functions, Activity limitations: Decreased functional mobility ; Decreased endurance;Decreased strength;Decreased posture;Decreased ROM; Increased pain  Assessment: Pt demos mod A for bed mobility and max A x 2 for transfers. Pt unable to stand more than 30\" in a walker d/t grass weakness and poor flexibility. Pt will benefit from skilled PT to address post op deficits. PT recommend SNF at discharge to maximize function and minimize fall risk and burden of care. Treatment Diagnosis: weakness, decreased ROM, decreased mobility  Prognosis: Good  Decision Making: Medium Complexity  Patient Education: Disease Specific: pt educated in use of immobilizer, HEP post TKR, transfers. She voices understanding  Barriers to Learning: none  REQUIRES PT FOLLOW UP: Yes  Activity Tolerance  Activity Tolerance: Patient limited by endurance  Activity Tolerance: /68  HR 74  O2 95% RA     Patient Diagnosis(es): The primary encounter diagnosis was Localized osteoarthritis of left knee. A diagnosis of Status post total knee replacement, left was also pertinent to this visit. has a past medical history of Arthritis, CAD (coronary artery disease), Carotid stenosis, History of blood transfusion, Hyperlipidemia, Hypertension, Impaired mobility, Kidney disease, Mitral valve disease, OA (osteoarthritis), Peripheral vascular disease ([facility] [address] Thyroid disease, and Urinary frequency. has a past surgical history that includes Tonsillectomy; Elbow surgery; Coronary angioplasty with stent; joint replacement (11 );  Colonoscopy (2012); and Total knee arthroplasty (Left, 7/12/2021). Restrictions  Restrictions/Precautions  Restrictions/Precautions: Weight Bearing, General Precautions, Fall Risk  Required Braces or Orthoses?: Yes  Lower Extremity Weight Bearing Restrictions  Left Lower Extremity Weight Bearing: Weight Bearing As Tolerated  Required Braces or Orthoses  Left Lower Extremity Brace: Knee Immobilizer  Position Activity Restriction  Other position/activity restrictions: wound vac  Subjective   General  Chart Reviewed: Yes  Response To Previous Treatment: Patient with no complaints from previous session. Family / Caregiver Present: No  Referring Practitioner: Doris Martinez  Subjective  Subjective: pt agrees to therapy  General Comment  Comments: RN cleared pt for therapy  Pain Screening  Patient Currently in Pain: No (at rest)  Vital Signs  Patient Currently in Pain: No (at rest)       Orientation  Orientation  Overall Orientation Status: Within Normal Limits  Cognition      Objective   Bed mobility  Supine to Sit: Moderate assistance (increased time)  Transfers  Stand to sit: 2 Person Assistance;Maximum Assistance  Bed to Chair: Dependent/Total (s stedy)  Ambulation  Ambulation?: No  WB Status:  (d/t poor standing posture/balance)     Balance  Posture: Poor  Sitting - Static: Good;-  Sitting - Dynamic: Fair;+  Standing - Static: Poor  Exercises  Straight Leg Raise: \"bent leg raise\" d/t decreased Knee extension 10 L  Quad Sets: 15 x B max cues  Gluteal Sets: 15 x B  Hip Flexion: 10 x B  Hip Abduction: 10 x B assisted  Knee Long Arc Quad: 10 x B, w/ decreased extension bilaterally  Knee Short Arc Quad: 10 x LLE max assist  Knee Passive Range of Motion: R knee 12* to 99*  Ankle Pumps: 15 B         Comment: practiced sit EOB to stand to sw x 3: pt demos post lean and kyphotic posture w/ flexed B hip/kness. Pt unable to come to straight enough posture to stand for more than 30\" w/ mod max assist in RW. Practiced sit<> from seat of s stedy mod/max 2  3times.          AM-PAC Score AM-PAC Inpatient Mobility without Stair Climbing Raw Score : 8 (07/15/21 1321)  AM-PAC Inpatient without Stair Climbing T-Scale Score : 30.65 (07/15/21 1321)  Mobility Inpatient CMS 0-100% Score: 80.91 (07/15/21 1321)  Mobility Inpatient without Stair CMS G-Code Modifier : CM (07/15/21 1321)     SECOND SESSION:    Subjective:  Pain 4/10    THEREX:   Ankle pumps 15 B  Gluteal sets 15 B  Quadricep Isometrics 15 B  Heel slides 15 B  SAQ 15 B  LAQ 15 B  [place] assisted stretches into R knee extension 30: x 5    BED MOBILITY:  Sup<>sit>supine requires up to mod A    TRANSFERS:  Sit<>stand with max A x 2  Squat pivot transf chair to bed Max A x 2    GAIT:  Pt unsafe for ambuation d/t poor standing balance and weakness UB and LB    Education:   Educated patient in orthopedic precautions with patient verbalizing understanding    Disposition:Patient with call light in reach and alarm in place at end of session with patient in bed      Goals  Short term goals  Time Frame for Short term goals: 1 week ( 7/19) unless otherwise specified  Short term goal 1: pt to perform bed mob with CG  -7/15 mod assist  Short term goal 2: pt to perform transfers with min assist of -7/14 for sit<>stand=dependent max assist for walker.   For bed<>chair squat pivot transfers w/ max 1 + mod 1  -7/15 Sit<>stand max A, dependent total for bed to chair (martha ritter)  Short term goal 3: pt to amb with walker 10 ft with min assist of 1-2  -7/15 not met  Short term goal 4: pt to participate in LE Ex 12-15 reps by 7/16  -7/15 progressing  Patient Goals   Patient goals : \"to know how to use the knee immobilizer\"- MET  Pt education in benefits of skilled rehab vs home w/ 24 hour assist.  Plan    Plan  Times per week: BID 7 days wk  Times per day: Twice a day  Current Treatment Recommendations: Strengthening, ROM, Functional Mobility Training, Safety Education & Training, Gait Training, Pain Management, Transfer Training, Endurance Training  Safety Devices  Type of devices:  All fall risk precautions in place, Patient at risk for falls, Nurse notified, Call light within reach, Left in chair, Chair alarm in place     Therapy Time   Individual Concurrent Group Co-treatment   Time In 1103         Time Out 1126         Minutes 23         Timed Code Treatment Minutes: [address] [facility] Time:   [address] [facility]   Time In [address] [facility] E         Minutes 38           Timed Code Treatment Minutes:  [address] [facility]

## 2021-07-15 NOTE — PROGRESS NOTES
Occupational Therapy  Facility/Department: [facility] C5 - MED SURG/ORTHO  Daily Treatment Note  NAME: Jocelyne Dave  : 1935  MRN: [id number]    Date of Service: 7/15/2021    Discharge Recommendations:  Subacute/Skilled Nursing Facility  OT Equipment Recommendations  Other: defer    Assessment   Performance deficits / Impairments: Decreased functional mobility ; Decreased safe awareness;Decreased balance;Decreased ADL status; Decreased endurance;Decreased strength;Decreased high-level IADLs    Assessment: Pt was pleasant and agreeable to therapy. Pt continues to demo the above listed deficits. Pt required max A x 2 for standing with RW, for LB dressing. Unable to complete stand pivot transfer with RW. Pt was unable to WB through UEs and LEs, with max cueing, maintaining a flexed position at knees and trunk. Pt. required total A with functional mobility using stedy. Pt completed and was educated on UE exercises for strength, endurance, and edema control. Pt continues to require increased time and assitance for functional tasks. Continues skilled OT services are recommended to sure safe return to PLOF. Prognosis: Good  Decision Making: Medium Complexity  OT Education: OT Role;Plan of Care;Energy Conservation;Transfer Training;Precautions; ADL Adaptive Strategies    Disease Specific Education: Pt educated on importance of OOB mobility, prevention of complications of bedrest, and general safety during hospitalization. Pt verbalized understanding    REQUIRES OT FOLLOW UP: Yes  Activity Tolerance  Activity Tolerance: Patient Tolerated treatment well  Activity Tolerance: BP: 153/71 HR: 84 O2: 94%  BP: 145/68 HR: 74  Safety Devices  Safety Devices in place: Yes  Type of devices: Call light within reach; Chair alarm in place; Left in chair;Nurse notified;Gait belt         Patient Diagnosis(es): The primary encounter diagnosis was Localized osteoarthritis of left knee.  A diagnosis of Status post total knee replacement, left was also pertinent to this visit. has a past medical history of Arthritis, CAD (coronary artery disease), Carotid stenosis, History of blood transfusion, Hyperlipidemia, Hypertension, Impaired mobility, Kidney disease, Mitral valve disease, OA (osteoarthritis), Peripheral vascular disease ([address] Thyroid disease, and Urinary frequency. has a past surgical history that includes Tonsillectomy; Elbow surgery; Coronary angioplasty with stent; joint replacement (8/30/11 ); Colonoscopy (2/2012); and Total knee arthroplasty (Left, 7/12/2021).     Restrictions  Restrictions/Precautions  Restrictions/Precautions: Weight Bearing, General Precautions, Fall Risk  Required Braces or Orthoses?: Yes  Lower Extremity Weight Bearing Restrictions  Left Lower Extremity Weight Bearing: Weight Bearing As Tolerated  Required Braces or Orthoses  Left Lower Extremity Brace: Knee Immobilizer  Position Activity Restriction  Other position/activity restrictions: wound vac     Subjective   General  Chart Reviewed: Yes  Patient assessed for rehabilitation services?: Yes  Family / Caregiver Present: No  Referring Practitioner: Jagruti White PA-C  Diagnosis: LEFT TOTAL KNEE ARTHROPLASTY  Subjective  Subjective: Pt pleasant and agreeable to therapy  General Comment  Comments: RN approved therapy  Pain Assessment  Christian-Rojo Pain Rating: Hurts little more  Vital Signs  Patient Currently in Pain: No     Orientation  Orientation  Overall Orientation Status: Within Functional Limits     Objective    ADL  Grooming: Stand by assistance  LE Dressing: Dependent/Total  Toileting: Dependent/Total (velazquez)        Balance  Sitting Balance: Stand by assistance  Standing Balance: Dependent/Total (mod-max x 2 with RW and stedy)  Standing Balance  Time: ~30 seconds-1 minute x 2 with RW ~30 seconds-1 minute x 4 in stedy  Activity: Functional transfer to the chair, standing for LB dressing  Comment: max cueing for posture correction and to WB through UB and RLE  Functional Mobility  Functional - Mobility Device: Other (stedy)  Activity: Other  Assist Level: Dependent/Total  Functional Mobility Comments: mod-max A x2 for STEDY  Bed mobility  Supine to Sit: Moderate assistance (increased time ad cues for hand placement)  Sit to Supine: Unable to assess  Scooting: Minimal assistance (increased time)  Transfers  Stand Pivot Transfers: 2 Person assistance;Maximum assistance  Sit to stand: Moderate assistance;2 Person assistance (mod-max x 2)  Stand to sit: 2 Person assistance; Moderate assistance  Transfer Comments: mod A x2 in STEDY due to pain and inability to WB through UB and LB. EOB to chair in preparation for functional distances                       Cognition  Overall Cognitive Status: Exceptions  Following Commands: Follows one step commands with repetition; Follows one step commands with increased time  Attention Span: Attends with cues to redirect  Safety Judgement: Decreased awareness of need for assistance  Problem Solving: Assistance required to generate solutions;Assistance required to identify errors made  Initiation: Requires cues for some  Sequencing: Requires cues for some  Cognition Comment: increased time                    Type of ROM/Therapeutic Exercise  Type of ROM/Therapeutic Exercise: AROM  Exercises  Wrist Flexion: x15  Wrist Extension: x15  Finger Flexion: x15  Finger Extension: x15  Other: Pt educated on UE exercises to prevent muscle atrophy and edema, pt verbalized understanding                    Plan   Plan  Times per week: 4-6x/wk  Current Treatment Recommendations: Balance Training, Functional Mobility Training, Safety Education & Training, Self-Care / ADL    AM-[facility] Score        -[facility] Inpatient Daily Activity Raw Score: 13 (07/15/21 1408)  AM-PAC Inpatient ADL T-Scale Score : 32.03 (07/15/21 1408)  ADL Inpatient CMS 0-100% Score: 63.03 (07/15/21 1408)  ADL Inpatient CMS G-Code Modifier : CL (07/15/21 1408)    Goals  Short term goals  Time Frame for Short term goals: 1 week (7/20/21)  Short term goal 1: Pt will complete toilet transfer with min A. Ongoing: max A x2 7/15  Short term goal 2: Pt will complete LB dressing with AE prn. Ongoing: required max a 7/15  Short term goal 3: Pt will complete 15 reps of BUE exercises for increased endurance and strength. Ongoing: completed hand and wrist (7/15)  Short term goal 4: Pt will verbalize understanding of safe car transfer. (7/17/21) Ongoing: not addressed at this time (7/15)  Patient Goals   Patient goals : \"to be able to walk to the toilet before I go home\"       Therapy Time   Individual Concurrent Group Co-treatment   Time In 1040         Time Out 1103         Minutes 23         Timed Code Treatment Minutes: Roger Haynes 79, OTR/L  If pt is unable to be seen after this session, please let this note serve as discharge summary. Please see case management note for discharge disposition. Thank you.

## 2021-07-15 NOTE — PROGRESS NOTES
Hospitalist Progress Note      PCP: Leighton Tineo MD    Date of Admission: 7/12/2021    Chief Complaint: Left knee pain    Hospital Course: 81 y.o. female who we are asked to see/evaluate by Radha Ngo MD for medical management of CAD, HTN, CKD. Subjective: Still with pain. Cr improved with velazquez placement.       Medications:  Reviewed    Infusion Medications    sodium chloride      dextrose      lactated ringers 125 mL/hr at 07/13/21 1953    sodium chloride       Scheduled Medications    apixaban  2.5 mg Oral BID    amLODIPine  5 mg Oral BID    atorvastatin  40 mg Oral Nightly    Vitamin D  2,000 Units Oral Daily    fenofibrate  160 mg Oral Daily    levothyroxine  50 mcg Oral Daily    metoprolol tartrate  25 mg Oral BID    therapeutic multivitamin-minerals  1 tablet Oral Daily    insulin glargine  0.25 Units/kg Subcutaneous Nightly    insulin lispro  0.08 Units/kg Subcutaneous TID WC    insulin lispro  0-6 Units Subcutaneous TID WC    insulin lispro  0-3 Units Subcutaneous Nightly    sodium chloride flush  10 mL Intravenous 2 times per day    acetaminophen  650 mg Oral Q6H    sennosides-docusate sodium  1 tablet Oral BID    [Held by provider] lisinopril  20 mg Oral Daily    And    [Held by provider] hydroCHLOROthiazide  12.5 mg Oral Daily     PRN Meds: sodium chloride, glucose, dextrose, glucagon (rDNA), dextrose, sodium chloride flush, sodium chloride, oxyCODONE **OR** oxyCODONE, morphine **OR** morphine, magnesium hydroxide, ondansetron **OR** ondansetron      Intake/Output Summary (Last 24 hours) at 7/15/2021 1136  Last data filed at 7/15/2021 0855  Gross per 24 hour   Intake 680 ml   Output 625 ml   Net 55 ml       Physical Exam Performed:    BP (!) 178/84   Pulse 90   Temp 98.6 °F (37 °C) (Oral)   Resp 18   Ht 5' 4\" (1.626 m)   Wt 169 lb (76.7 kg)   SpO2 96%   BMI 29.01 kg/m²     General appearance: No apparent distress, appears stated age and Result   Status post knee replacement, in normal alignment. No acute fracture. Immediate postoperative changes. 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  - management per ortho. - pain meds, DVT ppx, PT/OT evals.     Acute blood loss anemia   - expected due to surgical losses. Hgb 7.  - transfused 1 unit of pRBC given MARGY on 7/13.  - follow CBC.     MARGY on CKD - likely due to volume losses. - hold home lisinopril and HCTZ. - follow BMP. - avoid nephrotoxins and hypotension.     CAD/HTN - controlled. - holding home ACEi, HCTZ. - continue home amlodipine, Toprol with hold parameters. - continue home ASA, statin.     Hypothyroidism - continue home levothyroxine.     Urinary retention  - likely due to immobility and compounded by large pelvic mass noted per CT at [facility] on 7/8/21. This mass is likely an ovarian malignancy, that has not yet been worked up yet. - recommend dc with velazquez   - PVR > 999.       DVT Prophylaxis: SCDs per ortho  Diet: ADULT DIET; Regular  Code Status: Full Code    PT/OT Eval Status: recommend SNF    Dispo - per ortho - likely medically stable for dc.   Will need to dc with velazquez and have further imaging for large pelvic mass that is likely affecting her ability to void    LINDSAY Fournier - CNP

## 2021-07-16 NOTE — PROGRESS NOTES
Colonoscopy (2/2012); and Total knee arthroplasty (Left, 7/12/2021). Restrictions  Restrictions/Precautions  Restrictions/Precautions: Weight Bearing, General Precautions, Fall Risk  Required Braces or Orthoses?: Yes  Lower Extremity Weight Bearing Restrictions  Left Lower Extremity Weight Bearing: Weight Bearing As Tolerated  Required Braces or Orthoses  Left Lower Extremity Brace: Knee Immobilizer  Position Activity Restriction  Other position/activity restrictions: wound vac  Subjective   General  Chart Reviewed: Yes  Patient assessed for rehabilitation services?: Yes  Family / Caregiver Present: No  Referring Practitioner: Hong Gonzalez PA-C  Diagnosis: LEFT TOTAL KNEE ARTHROPLASTY  Subjective  Subjective: Pt pleasant and agreeable to therapy  General Comment  Comments: RN approved therapy  Pain Assessment  Pain Assessment: 0-10  Pain Level: 5  Pain Location: Knee  Pain Orientation: Left  Pain Descriptors: Aching  Non-Pharmaceutical Pain Intervention(s): Ambulation/Increased Activity;Repositioned  Vital Signs  Patient Currently in Pain: Yes   Orientation  Orientation  Overall Orientation Status: Within Functional Limits  Objective                Bed mobility  Supine to Sit: Moderate assistance  Sit to Supine: Moderate assistance                          Cognition  Overall Cognitive Status: Exceptions  Following Commands: Follows one step commands with repetition; Follows one step commands with increased time  Attention Span: Attends with cues to redirect  Safety Judgement: Decreased awareness of need for assistance  Problem Solving: Assistance required to generate solutions;Assistance required to identify errors made  Initiation: Requires cues for some  Sequencing: Requires cues for some  Cognition Comment: increased time                    Type of ROM/Therapeutic Exercise  Type of ROM/Therapeutic Exercise: AROM  Exercises  Elbow Flexion: x10  Elbow Extension: x10  Supination: x10  ZAHRA AROM (degrees)  ZAHRA AROM : WFL  Left Hand PROM (degrees)  Left Hand PROM: WFL  Left Hand AROM (degrees)  Left Hand AROM: WFL  RUE PROM (degrees)  RUE PROM: WFL  RUE AROM (degrees)  RUE AROM : WFL  Right Hand PROM (degrees)  Right Hand PROM: WFL  Right Hand AROM (degrees)  Right Hand AROM: WFL                 Plan   Plan  Times per week: 4-6x/wk  Current Treatment Recommendations: Balance Training, Functional Mobility Training, Safety Education & Training, Self-Care / ADL    AM-PAC Score        AM-PAC Inpatient Daily Activity Raw Score: 13 (07/16/21 1552)  AM-PAC Inpatient ADL T-Scale Score : 32.03 (07/16/21 1552)  ADL Inpatient CMS 0-100% Score: 63.03 (07/16/21 1552)  ADL Inpatient CMS G-Code Modifier : CL (07/16/21 1552)    Goals  Short term goals  Time Frame for Short term goals: 1 week (7/20/21)  Short term goal 1: Pt will complete toilet transfer with min A. Ongoing: max A x2 7/15  Short term goal 2: Pt will complete LB dressing with AE prn. Ongoing: required max a 7/15  Short term goal 3: Pt will complete 15 reps of BUE exercises for increased endurance and strength. Ongoing: completed hand and wrist (7/15)  Short term goal 4: Pt will verbalize understanding of safe car transfer.  (7/17/21) Ongoing: not addressed at this time (7/15)  Patient Goals   Patient goals : \"to be able to walk to the toilet before I go home\"       Therapy Time   Individual Concurrent Group Co-treatment   Time In 1512         Time Out 1520         Minutes 8         Timed Code Treatment Minutes: Krystina 84, OT

## 2021-07-16 NOTE — PROGRESS NOTES
Department of Orthopedic Surgery  Nurse Practitioner   Progress Note    Subjective:     Post-Operative Day: 4 Status Post left Total Knee Arthroplasty  Systemic or Specific Complaints:  About the same today overall    Objective:     Patient Vitals for the past 24 hrs:   BP Temp Temp src Pulse Resp SpO2   07/16/21 0835 (!) 174/73 98.3 °F (36.8 °C) Oral 88 18 97 %   07/15/21 2341 135/61 99.2 °F (37.3 °C) Oral 78 18 95 %   07/15/21 1940 (!) 145/66 98.4 °F (36.9 °C) Oral 86 18 99 %   07/15/21 1422 118/61 97.8 °F (36.6 °C) Oral 79 -- 97 %       General: alert, appears stated age, cooperative and no distress   Wound: Wound clean and dry no evidence of infection. ACE, KI   Motion: Painful range of Motion   DVT Exam: No evidence of DVT seen on physical exam.       Knee swollen but thigh soft to palpation. Moving foot and ankle. Good distal pulses. Data Review  CBC:   Lab Results   Component Value Date    WBC 10.0 07/16/2021    RBC 2.83 07/16/2021    HGB 8.2 07/16/2021    HCT 24.4 07/16/2021     07/16/2021       Assessment:     Status Post left Total Knee Arthroplasty. Doing well postoperatively. Plan:      1: Continues current post-op course : Will plan d/c to SNF today  2:  Continue Deep venous thrombosis prophylaxis - asa 81 mg BID  3:  Continue physical therapy, OT, WBAT  4:  Continue Pain Control    MERYL Scott

## 2021-07-16 NOTE — DISCHARGE SUMMARY
Department of Orthopedic Surgery  Physician Assistant   Discharge Summary    The Martha Bob is a 81 y.o. female underwent total knee replacement procedure without complication. Martha Bob was admitted to the floor following Her recovery in the PACU.      Discharge Diagnosis  left Knee Replacement    Current Inpatient Medications    Current Facility-Administered Medications: apixaban (ELIQUIS) tablet 2.5 mg, 2.5 mg, Oral, BID  0.9 % sodium chloride infusion, , Intravenous, PRN  amLODIPine (NORVASC) tablet 5 mg, 5 mg, Oral, BID  atorvastatin (LIPITOR) tablet 40 mg, 40 mg, Oral, Nightly  Vitamin D (CHOLECALCIFEROL) tablet 2,000 Units, 2,000 Units, Oral, Daily  fenofibrate (TRIGLIDE) tablet 160 mg, 160 mg, Oral, Daily  levothyroxine (SYNTHROID) tablet 50 mcg, 50 mcg, Oral, Daily  metoprolol tartrate (LOPRESSOR) tablet 25 mg, 25 mg, Oral, BID  therapeutic multivitamin-minerals 1 tablet, 1 tablet, Oral, Daily  insulin glargine (LANTUS) injection vial 19 Units, 0.25 Units/kg, Subcutaneous, Nightly  insulin lispro (HUMALOG) injection vial 6 Units, 0.08 Units/kg, Subcutaneous, TID WC  insulin lispro (HUMALOG) injection vial 0-6 Units, 0-6 Units, Subcutaneous, TID WC  insulin lispro (HUMALOG) injection vial 0-3 Units, 0-3 Units, Subcutaneous, Nightly  glucose (GLUTOSE) 40 % oral gel 15 g, 15 g, Oral, PRN  dextrose 50 % IV solution, 12.5 g, Intravenous, PRN  glucagon (rDNA) injection 1 mg, 1 mg, Intramuscular, PRN  dextrose 5 % solution, 100 mL/hr, Intravenous, PRN  lactated ringers infusion, , Intravenous, Continuous  sodium chloride flush 0.9 % injection 10 mL, 10 mL, Intravenous, 2 times per day  sodium chloride flush 0.9 % injection 10 mL, 10 mL, Intravenous, PRN  0.9 % sodium chloride infusion, 25 mL, Intravenous, PRN  acetaminophen (TYLENOL) tablet 650 mg, 650 mg, Oral, Q6H  oxyCODONE (ROXICODONE) immediate release tablet 5 mg, 5 mg, Oral, Q4H PRN **OR** oxyCODONE (ROXICODONE) immediate release tablet 10 mg, 10 mg, Oral, Q4H PRN  morphine (PF) injection 2 mg, 2 mg, Intravenous, Q2H PRN **OR** morphine (PF) injection 4 mg, 4 mg, Intravenous, Q2H PRN  sennosides-docusate sodium (SENOKOT-S) 8.6-50 MG tablet 1 tablet, 1 tablet, Oral, BID  magnesium hydroxide (MILK OF MAGNESIA) 400 MG/5ML suspension 30 mL, 30 mL, Oral, Daily PRN  ondansetron (ZOFRAN-ODT) disintegrating tablet 4 mg, 4 mg, Oral, Q8H PRN **OR** ondansetron (ZOFRAN) injection 4 mg, 4 mg, Intravenous, Q6H PRN  [Held by provider] lisinopril (PRINIVIL;ZESTRIL) tablet 20 mg, 20 mg, Oral, Daily **AND** [Held by provider] hydroCHLOROthiazide (MICROZIDE) capsule 12.5 mg, 12.5 mg, Oral, Daily    Post-operatively the patients diet was advanced as tolerated and their incision was checked on POD #1. The incision is dressing in place, clean, dry and intact with no signs of infection. The patient remained neurovascularly intact in the lower extremity and had intact pulses distally. Patients calf remained soft and showed no evidence of DVT. The patient was able to move their leg and ankle/foot without any problems post-operatively. Physical therapy and occupational therapy were consulted and began working with the patient post-operatively. The patient progressed with PT/OT as would be expected and continued to improve through their stay. The patients pain was initially controlled with IV medications but we were able to transition to oral pain medications soon after arrival to the floor and their pain remained under good control through their hospital stay. From a medical standpoint the patient remained stable and continued to have the medicine team follow throughout their stay. The patients dressing was changed/incison was checked on day of d/c. The patient will be discharged at this time to [address] [place] [address]  with their current diet restrictions and will continue to follow the total knee precautions outlined to them by us and PT/OT.      Condition on Discharge: Stable    Plan  Return visit in 2 weeks. .  Patient was instructed on the use of pain medications, the signs and symptoms of infection, and was given our number to call should they have any questions or concerns following discharge. For opioid prescriptions given at discharge the following statement is provided for compliance with [employer] rules. Patient being given increased dosage/quantity of opoid pain medication in excess of [employer] guidelines which noted a 30 MED daily of opioids due to the fact that he/she has undergone major orthopaedic surgery as outlined in rule 4731-11-13. Dosages and further duration of the pain medication will be re-evaluated at her post op visit in 2 weeks. Patient was educated on dosing expectations and limits of prescribing as a result of the new [facility] Board rules enacted August 31, 2017. Please also note that this is not the initial opoid prescription issued to this patient but a continuation of medication utilized during the hospital admission as noted in the medical record. [place] report has also been utilized to screen for any abuse history or suspicious activity as outlined in [place]. All efforts have been taken to prevent abuse potential and misuse of opioid medications including education, screening, and close clinical follow up.

## 2021-07-16 NOTE — PLAN OF CARE
Problem: Skin Integrity:  Goal: Will show no infection signs and symptoms  Description: Will show no infection signs and symptoms  7/16/2021 0012 by Jose Patel RN  Outcome: Ongoing  7/15/2021 1755 by Taryn Rosales RN  Outcome: Ongoing     Problem: Skin Integrity:  Goal: Absence of new skin breakdown  Description: Absence of new skin breakdown  7/16/2021 0012 by Jose Patel RN  Outcome: Ongoing  7/15/2021 1755 by Taryn Rosales RN  Outcome: Ongoing

## 2021-07-16 NOTE — CARE COORDINATION
CM spoke to pt's  on the phone and updated that he would like referral to [facility]. Referral faxed, LVM with Lillie in admissions to confirm receipt.  CM following-Sarahy Herzog RN

## 2021-07-16 NOTE — PROGRESS NOTES
Hospitalist Progress Note      PCP: Terell Gilliam MD    Date of Admission: 7/12/2021    Chief Complaint: Left knee pain    Hospital Course: 81 y.o. female who we are asked to see/evaluate by Juaquin Major MD for medical management of CAD, HTN, CKD. Subjective: Cr down to 1.3. Looks much better. Medications:  Reviewed    Infusion Medications    sodium chloride      dextrose      lactated ringers 125 mL/hr at 07/13/21 1953    sodium chloride       Scheduled Medications    apixaban  2.5 mg Oral BID    amLODIPine  5 mg Oral BID    atorvastatin  40 mg Oral Nightly    Vitamin D  2,000 Units Oral Daily    fenofibrate  160 mg Oral Daily    levothyroxine  50 mcg Oral Daily    metoprolol tartrate  25 mg Oral BID    therapeutic multivitamin-minerals  1 tablet Oral Daily    insulin glargine  0.25 Units/kg Subcutaneous Nightly    insulin lispro  0.08 Units/kg Subcutaneous TID WC    insulin lispro  0-6 Units Subcutaneous TID WC    insulin lispro  0-3 Units Subcutaneous Nightly    sodium chloride flush  10 mL Intravenous 2 times per day    acetaminophen  650 mg Oral Q6H    sennosides-docusate sodium  1 tablet Oral BID    [Held by provider] lisinopril  20 mg Oral Daily    And    [Held by provider] hydroCHLOROthiazide  12.5 mg Oral Daily     PRN Meds: sodium chloride, glucose, dextrose, glucagon (rDNA), dextrose, sodium chloride flush, sodium chloride, oxyCODONE **OR** oxyCODONE, morphine **OR** morphine, magnesium hydroxide, ondansetron **OR** ondansetron      Intake/Output Summary (Last 24 hours) at 7/16/2021 0901  Last data filed at 7/16/2021 0338  Gross per 24 hour   Intake 80 ml   Output 1050 ml   Net -970 ml       Physical Exam Performed:    BP (!) 174/73   Pulse 88   Temp 98.3 °F (36.8 °C) (Oral)   Resp 18   Ht 5' 4\" (1.626 m)   Wt 169 lb (76.7 kg)   SpO2 97%   BMI 29.01 kg/m²     General appearance: No apparent distress, appears stated age and cooperative.   HEENT: Pupils equal, round, and reactive to light. Conjunctivae/corneas clear. Neck: Supple, with full range of motion. No jugular venous distention. Trachea midline. Respiratory:  Normal respiratory effort. Clear to auscultation, bilaterally without Rales/Wheezes/Rhonchi. Cardiovascular: Regular rate and rhythm with normal S1/S2 without murmurs, rubs or gallops. Abdomen: Soft, non-tender, non-distended with normal bowel sounds. Musculoskeletal: No clubbing, cyanosis or edema bilaterally. Full range of motion without deformity. Skin: Skin color, texture, turgor normal.  No rashes or lesions. Neurologic:  Neurovascularly intact without any focal sensory/motor deficits. Cranial nerves: II-XII intact, grossly non-focal.  Psychiatric: Alert and oriented, thought content appropriate, normal insight  Capillary Refill: Brisk,3 seconds, normal   Peripheral Pulses: +2 palpable, equal bilaterally       Labs:   Recent Labs     07/14/21  0624 07/15/21  0623 07/16/21  0533   WBC 12.0* 13.3* 10.0   HGB 9.1* 8.8* 8.2*   HCT 26.9* 26.1* 24.4*    166 176     Recent Labs     07/14/21  0624 07/15/21  0623 07/16/21  0533   * 133* 136   K 4.5 4.2 4.2    101 104   CO2 23 22 24   BUN 54* 60* 56*   CREATININE 1.8* 1.5* 1.3*   CALCIUM 8.9 9.5 9.1     No results for input(s): AST, ALT, BILIDIR, BILITOT, ALKPHOS in the last 72 hours. No results for input(s): INR in the last 72 hours. No results for input(s): Jacob Hug in the last 72 hours. Urinalysis:      Lab Results   Component Value Date    NITRU Negative 06/24/2021    WBCUA Rare 08/18/2011    BACTERIA 1+ 08/18/2011    RBCUA Rare 08/18/2011    BLOODU Negative 06/24/2021    SPECGRAV >=1.030 06/24/2021    GLUCOSEU Negative 06/24/2021    GLUCOSEU NEGATIVE 08/18/2011       Radiology:  XR KNEE LEFT (1-2 VIEWS)   Final Result   Status post knee replacement, in normal alignment. No acute fracture. Immediate postoperative changes. 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  - management per ortho. - pain meds, DVT ppx, PT/OT evals.     Acute blood loss anemia   - expected due to surgical losses. Hgb 7.  - transfused 1 unit of pRBC given MARGY on 7/13.  - follow CBC.     MARGY on CKD - likely due to volume losses. - hold home lisinopril and HCTZ. - follow BMP. - avoid nephrotoxins and hypotension.     CAD/HTN - controlled. - holding home ACEi, HCTZ. - continue home amlodipine, Toprol with hold parameters. - continue home ASA, statin.     Hypothyroidism - continue home levothyroxine.     Urinary retention  - likely due to immobility and compounded by large pelvic mass noted [facility] at [facility] on 7/8/21. This mass is likely an ovarian malignancy, that has not yet been worked up.   - recommend dc with velazquez   - PVR > 999.       DVT Prophylaxis: SCDs per ortho  Diet: ADULT DIET; Regular  Code Status: Full Code    PT/OT Eval Status: recommend SNF    Dispo - per ortho - likely medically stable for dc.   Will need to dc with velazquez and have further imaging for large pelvic mass that is likely affecting her ability to void    Vidhi Stable, APRN - CNP

## 2021-07-16 NOTE — PROGRESS NOTES
Pt continues to refuse to straighten surgical leg. RN attempted to passively straighten, which caused patient increased pain and anxiety. RN will relay information.

## 2021-07-16 NOTE — PROGRESS NOTES
Pt assessment completed and charted. VSS. Pt a/o. Bed in lowest position and wheels locked. Call light within reach. Bedside table within reach. Non-skid footwear in place. Pt denies any other needs at this time. Pt calls out appropriately. Will continue to monitor.

## 2021-07-16 NOTE — CARE COORDINATION
CASE MANAGEMENT DISCHARGE SUMMARY      Discharge to: [facility], Sk[facility]    Precertification completed: [facility] Exemption Notification (HENS) completed: Yes    IMM given: admission    New Durable Medical Equipment ordered/agency: None    Transportation:    Family/car: [place]   Medical Transport explained to [employer]. Pt/family voice no agency preference. Agency used: [facility]   time: 1700   Ambulance form completed: Yes    Confirmed discharge plan with: Spoke to pt's  Christal Abreu on the phone on this day. Patient: yes     Facility/Agency, name:  [facility] faxed-yes    Phone number for report to facility: [phone]. RN, name: [facility]    Note: Discharging nurse to complete PIPE, reconcile AVS, and place final copy with patient's discharge packet. RN to ensure that written prescriptions for  Level II medications are sent with patient to the facility as per protocol.

## 2021-07-21 NOTE — CARE COORDINATION
Spoke with :      Nurse                                         @ : Lesley Sutton. Incision status: States no complications with incision. Pain control: States taking pain meds prior to therapy. Therapies involved: Continues with physical and occupational therapy. Tolerating: Well. Barriers to being discharged home: Will need assistance at discharge. Projected discharge date:No discharge date set. Discharge needs: Will need assistance at home.

## 2021-07-27 NOTE — CARE COORDINATION
Called Gualberto Tejada for patient updates. Left message for nurse to return call.   Nany Vergara BSN  Care Transition Nurse for ortho bundle  [phone]

## 2021-07-29 NOTE — PROGRESS NOTES
Dr Tarsha Jackson      Date /Time 7/29/2021       11:34 AM EDT  Name Alexandra Osorio             1935   Location  [facility]  MRN [id number]                Chief Complaint   Patient presents with    Post-Op Check     LEFT TKR 7/12/21        History of Present Illness      Alexandra Osorio is a 81 y.o. female is here for post-op visit after LEFT  [address]89 Total Knee Arthroplasty    Patient presents the office today 2.5 weeks status post left total knee arthroplasty. She continues to reside at a skilled nursing facility. She is here in a wheelchair using a knee immobilizer. Physical Exam    Based off 1997 Exam Criteria    Ht 5' 4\" (1.626 m)   Wt 169 lb (76.7 kg)   BMI 29.01 kg/m²      Constitutional:       General: He is not in acute distress. Appearance: Normal appearance. LEFT Knee: incision clean, intact, healing appropriately. No surrounding  erythema or fluctuance. Neuro intact distal. No evidence of DVT. Patient is able to actively fire her quad but lacks strength. Passive range of motion 5-90  Active range of motion 15-90    Imaging       Left Knee: [address] [facility]  Radiographs: X-rays were ordered and reviewed of the left knee. 3 views. AP, lateral, and skyline views. They demonstrate a left total knee arthroplasty in good position. No evidence of loosening or periprosthetic fracture. Assessment and Plan    Ward De La Cruz was seen today for post-op check. Diagnoses and all orders for this visit:    S/P TKR (total knee replacement), left  -     XR KNEE LEFT (3 VIEWS); Future        Patient is to continue with physical therapy. Physical therapy to work on quad strength and range of motion. Patient should continue in her knee immobilizer when ambulating until quad strength returns. We will see the patient back in approximately 3 weeks or sooner if problems arise. In addition we have asked physical therapy to perform nerve stimulation for quad function.   She is also given an outpatient physical therapy prescription to start when she leaves the extended care facility. Electronically signed by Lyle Casanova MD on 7/29/2021 at 11:34 AM  This dictation was generated by voice recognition computer software. Although all attempts are made to edit the dictation for accuracy, there may be errors in the transcription that are not intended.

## 2021-08-03 NOTE — CARE COORDINATION
Spoke with :       Ritchie Sahu                                        @ : Savita Thomson    Incision status:States no complications with incision. Therapies involved: Continues with therapy. Tolerating:Well    Barriers to being discharged home: Spouse is having a meeting with MSW to discuss discharge plans, and may stay longer. Projected discharge date:No discharge date set at this time. Discharge needs: Will need further assistance at discharge.       Inis Koyanagi BSN  Care Transition Nurse for ortho bundle  [phone]

## 2021-08-11 NOTE — CARE COORDINATION
Spoke with :      Nurse                                         @ :Warden Rod    Incision status: States free from redness or drainage, and strips are off. Pain control:Well controlled. Therapies involved:Continues with therapy. Tolerating:Fair, not progressing as quickly and still requires assistance to transfer. Barriers to being discharged home: Needs to be independent. Projected discharge date:No date set. Discharge needs:May stay longer, but will need assistance at discharge.       Mika Cervantes BSN  Care Transition Nurse for ortho bundle  [phone]

## 2021-08-16 PROBLEM — R19.07 GENERALIZED ABDOMINAL MASS: Status: ACTIVE | Noted: 2021-01-01

## 2021-08-16 NOTE — H&P
Hospital Medicine History & Physical      PCP: Estela Cooney MD    Date of Admission: 8/16/2021    Date of Service: Pt seen/examined on 8/16/2021 and Admitted to observation with expected LOS less than two midnights due to medical therapy. Chief Complaint:  Dislodged urinary catheter    History Of Present Illness:      81 y.o. female, with PMH of HTN, CAD, HLD and CKD, who presented to [facility] with dislodged urinary catheter. History obtained from the patient and review of EMR. The patient stated she had a total left knee arthroplasty on 7/11/2021. She stated postop she was experiencing some urinary retention and a Lerma catheter was placed at that time. However, the patient stated yesterday her catheter was dislodged at home. In the emergency room the patient was bladder scanned which revealed roughly 900 mL of urine. A Lerma catheter was inserted and there was no drainage. Upon reviewing the chart, the patient had a CT scan of her abdomen and pelvis on 7/7/2021 that revealed a large mixed cystic and solid pelvic mass. Findings are concerning for an ovarian malignancy. The patient stated at that time she was recommended to move forward with her left total arthroplasty and has not followed up with gynecology/oncology. In the emergency room urinalysis was obtained that was negative for infection. The patient will be admitted for further evaluation and treatment. Gynecology/oncology has been consulted for the a.m. The patient denied any other associated symptoms as well as any aggravating and/or alleviating factors. At the time of this assessment, the patient was resting comfortably in bed. She currently denies any chest pain, back pain, abdominal pain, shortness of breath, numbness, tingling, N/V/C/D, fever and/or chills.      Past Medical History:          Diagnosis Date    Arthritis     CAD (coronary artery disease)     MI    Carotid stenosis     History of blood transfusion     s/p Rt TKR    Hyperlipidemia     Hypertension     Impaired mobility     recent falls - uses W/C    Kidney disease     stage 3 kidney disease    Mitral valve disease     and Aortic    OA (osteoarthritis)     Peripheral vascular disease (HCC)     Thyroid disease     Urinary frequency      Past Surgical History:          Procedure Laterality Date    COLONOSCOPY  2/2012    CORONARY ANGIOPLASTY WITH STENT PLACEMENT      2009    ELBOW SURGERY      left    JOINT REPLACEMENT  8/30/11     right total knee replacement    TONSILLECTOMY      TOTAL KNEE ARTHROPLASTY Left 7/12/2021    LEFT TOTAL KNEE ARTHROPLASTY WITH ADDUCTOR CANAL BLOCK FOR PAIN CONTROL              MEDACTA performed by Lyle Casanova MD at [place] [address]     Medications Prior to Admission:      Prior to Admission medications    Medication Sig Start Date End Date Taking? Authorizing Provider   apixaban (ELIQUIS) 2.5 MG TABS tablet Take 1 tablet by mouth 2 times daily 7/14/21   LINDSAY Wayne CNP   sennosides-docusate sodium (SENOKOT-S) 8.6-50 MG tablet Take 1 tablet by mouth 2 times daily 7/14/21   LINDSAY Wyane CNP   levothyroxine (SYNTHROID) 50 MCG tablet Take 50 mcg by mouth Daily  2/27/19   Historical Provider, MD   therapeutic multivitamin-minerals [facility]) tablet Take 1 tablet by mouth daily. Historical Provider, MD   fenofibrate (TRIGLIDE) 160 MG tablet Take 160 mg by mouth daily. Historical Provider, MD   amlodipine (NORVASC) 5 MG tablet Take 5 mg by mouth 2 times daily. Historical Provider, MD   metoprolol (LOPRESSOR) 25 MG tablet Take 25 mg by mouth 2 times daily. Historical Provider, MD   atorvastatin (LIPITOR) 40 MG tablet Take 40 mg by mouth nightly. Historical Provider, MD   Cholecalciferol (VITAMIN D3) 2000 UNIT CAPS Take 2,000 Units by mouth daily. Historical Provider, MD     Allergies:  Patient has no known allergies.     Social History:      The patient currently lives at home    TOBACCO:   reports that she has quit smoking. She has never used smokeless tobacco.  ETOH:   reports current alcohol use. E-Cigarettes/Vaping Use     Questions Responses    E-Cigarette/Vaping Use Never User    Start Date     Passive Exposure     Quit Date     Counseling Given     Comments         Family History:      Reviewed in detail. Positive as follows:        Problem Relation Age of Onset    Heart Disease Mother     Cancer Mother         ovarian    Cancer Father         lulu     REVIEW OF SYSTEMS COMPLETED:   Pertinent positives as noted in the HPI. All other systems reviewed and negative. PHYSICAL EXAM PERFORMED:    BP (!) 171/72   Pulse 77   Temp 98.6 °F (37 °C) (Oral)   Resp 16   Ht 5' 4\" (1.626 m)   Wt 169 lb (76.7 kg)   SpO2 95%   BMI 29.01 kg/m²     General appearance:  Pleasant female in no apparent distress, appears stated age and cooperative. HEENT: Pupils equal, round, and reactive to light. Extra ocular muscles intact. Conjunctivae/corneas clear. Neck: Supple, with full range of motion. No jugular venous distention. Trachea midline. Respiratory:  Normal respiratory effort. Clear to auscultation, bilaterally without Rales/Wheezes/Rhonchi. Cardiovascular:  Regular rate and rhythm with normal S1/S2 without murmurs, rubs or gallops. Abdomen: Soft, non-tender, non-distended with normal bowel sounds. Musculoskeletal:  No clubbing, cyanosis or edema bilaterally. Full range of motion without deformity. Skin: Skin color, texture, turgor normal.  No significant rashes or lesions. Neurologic:  Neurovascularly intact.  Cranial nerves: II-XII intact, grossly non-focal.  Psychiatric:  Alert and oriented, thought content appropriate, normal insight  Capillary Refill: Brisk,3 seconds, normal  Peripheral Pulses: +2 palpable, equal bilaterally     Labs:     Urinalysis:      Lab Results   Component Value Date    NITRU Negative 06/24/2021    WBCUA Rare 08/18/2011    BACTERIA 1+ 08/18/2011    RBCUA Rare 08/18/2011    BLOODU Negative 06/24/2021    SPECGRAV >=1.030 06/24/2021    GLUCOSEU Negative 06/24/2021    Joelle Ham 994 NEGATIVE 08/18/2011     Radiology:     CXR: I have reviewed the CXR with the following interpretation: N/A    EKG:  I have reviewed the EKG with the following interpretation: N/A    No orders to display     ASSESSMENT:    Active Hospital Problems    Diagnosis Date Noted    CAD (coronary artery disease) [I25.10]     Hyperlipidemia [E78.5]     Hypertension [I10]     CKD (chronic kidney disease) [N18.9]     Pelvic mass [R19.00]      PLAN:    Abdominal mass concerning for malignancy  -CT abdomen pelvis on 7/7/2021 revealed: Large mixed cystic and solid pelvic mass as outlined above. Findings are concerning for an ovarian malignancy. -UA negative for infection  -gynecology oncology consulted - appreciate recommendations in advance    Essential HTN in setting of known CAD  -continue amlodipine and metoprolol    HLD  -continue atorvastatin    CKD, cr 1.3 on admission  -cr appears to be baseline  -bmp in am    Chronic normocytic anemia, 10.5/31.3  -No signs or symptoms of bleeding at this time  -CBC in a.m. DVT Prophylaxis: lovenox    Diet: No diet orders on file     Code Status: Prior    PT/OT Eval Status: ordered    Dispo - 1-2 days pending clinical improvement     LINDSAY Guallpa - CNP    Thank you Johnson Weinberg MD for the opportunity to be involved in this patient's care.  If you have any questions or concerns please feel free to contact me at [phone].  -------------------Anticipated Dr. Ting esquivel-------------------------------------

## 2021-08-16 NOTE — CARE COORDINATION
CASE MANAGEMENT INITIAL ASSESSMENT      Reviewed chart and completed assessment at bedside with patient    Explained Case Management role/services. yes    Health Care Decision Maker :   Primary Decision Maker: Eric Randle Spouse - [phone]        Admit date/status: observation 08/16/21   Diagnosis: urinary retention. abd mass     Is this a Readmission?:  No      Insurance: Medicare. BCBS secondary     Precert required for SNF: No       3 night stay required: No    Living arrangements, Adls, care needs, prior to admission: lives with spouse in 2 story home. Ramped entry. Chair lift to second floor. Does not drive    Transportation: TBD     Durable Medical Equipment at home:  Walker_x_Cane_x_RTS__ BSC__Shower Chair_x_  02__ HHN__ CPAP__  BiPap__  Hospital Bed__ W/C_x_ chair lift     Services in the home and/or outpatient, prior to admission: [facility] - pt states she was discharged from [facility] on Saturday (2 days ago). Now out of skilled medicare days. PT/OT recs: [facility] Exemption Notification (HEN): not started    Barriers to discharge: pt has no medicare days left. Does think she can tolerate ARU and \"strenuous therapy\"    Plan/comments: pt agreeable to resuming home care (However, pt states they did not make first visit yet, d/t quick turn around from  [facility] to home to ED.     ECOC on chart for MD signature. CM team will continue to follow.      Juli Cheung RN

## 2021-08-16 NOTE — PROGRESS NOTES
Occupational Therapy   Occupational Therapy Initial Assessment/ Treatment  Date: 2021   Patient Name: Thierno Rider  MRN: [id number]     : 1935    Date of Service: 2021    Discharge Recommendations:  IP Rehab  OT Equipment Recommendations  Other: defer    Assessment   Performance deficits / Impairments: Decreased functional mobility ; Decreased safe awareness;Decreased coordination;Decreased balance;Decreased ADL status; Decreased cognition;Decreased posture;Decreased endurance;Decreased ROM; Decreased high-level IADLs;Decreased strength  Assessment: Pt is an 80 yo woman admitted for a pelvic mass and demos decreased ADL status d/t the above deficits. Pt was indep prior to admission. Pt s/p L Knee surgery in 2021 and demos decreased functional mobility needing Mod+Max x2 for sit<>stand, Dep for LB ADLs and pt reports not amb for over 1 month. Pt would beneift from acute rehab and OT recommends d/c to IP Rehab. Treatment Diagnosis: debility  Prognosis: Fair  Decision Making: Medium Complexity  OT Education: OT Role;Plan of Care;Home Exercise Program;ADL Adaptive Strategies;Transfer Training  Patient Education: disease specific  Barriers to Learning: None Precieved  REQUIRES OT FOLLOW UP: Yes  Activity Tolerance  Activity Tolerance: Patient Tolerated treatment well  Activity Tolerance: BP- 173/67, HR-90 O2- 96%  Safety Devices  Safety Devices in place: Yes  Type of devices: Gait belt;Left in bed;Call light within reach; Patient at risk for falls; Bed alarm in place;Nurse notified  Restraints  Initially in place: No         Disease Specific Education: Pt educated on importance of OOB mobility, prevention of complications of bedrest, and general safety during hospitalization. Pt verbalized understanding    Patient Diagnosis(es): The primary encounter diagnosis was Displacement of Lerma catheter, initial encounter ([facility] [address]  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 Objective   Vision: Impaired  Vision Exceptions: Wears glasses for reading  Hearing: Within functional limits    Orientation  Overall Orientation Status: Within Normal Limits  Observation/Palpation  Posture: Good  Balance  Sitting Balance: Stand by assistance  Standing Balance: Dependent/Total (Mod+Max x2)  Standing Balance  Time: ~60 sec  Activity: stand to pull up brief  Comment: Mod+Max A x2  Functional Mobility  Functional Mobility Comments: Pt unable to amb at this time  ADL  Feeding: Setup  Grooming: Setup  LE Dressing: Dependent/Total (Mod+Max A to stand and pull up brief-- Dep for threading legs)  Toileting: Dependent/Total (velazquez)  Tone RUE  RUE Tone: Normotonic  Tone LUE  LUE Tone: Normotonic  Coordination  Movements Are Fluid And Coordinated: Yes     Bed mobility  Supine to Sit: Moderate assistance  Sit to Supine: Moderate assistance  Transfers  Sit to stand: Dependent/Total (Mod+Max x2)  Stand to sit: Dependent/Total (Mod+Max x2)     Cognition  Overall Cognitive Status: Exceptions  Arousal/Alertness: Appropriate responses to stimuli  Following Commands: Follows all commands without difficulty; Follows multistep commands with repitition  Attention Span: Appears intact  Memory: Decreased short term memory; Appears intact  Safety Judgement: Good awareness of safety precautions  Problem Solving: Assistance required to generate solutions;Decreased awareness of

## 2021-08-16 NOTE — ED PROVIDER NOTES
[facility]  ED  EMERGENCY DEPARTMENT ENCOUNTER      Pt Name: Breonna Newman  MRN: [id number]  Padminigfgianluca 1935  Date of evaluation: 8/16/2021  Provider: Racheal Ledbetter MD    CHIEF COMPLAINT       Chief Complaint   Patient presents with    Urinary Catheter Problem     PATIENT BROUGHT VIA EMS AFTER URINARY CATHETHER DISLODGED AT HOME. HISTORY OF PRESENT ILLNESS   (Location/Symptom, Timing/Onset,Context/Setting, Quality, Duration, Modifying Factors, Severity)  Note limiting factors. Breonna Newman is a 81 y.o. female who presents to the emergency department for dislodgment of her urinary catheter. Patient had knee surgery at the end of July since then she has been having difficulty urinating so a Lerma catheter was placed. Patient went to bed about midnight and she was up at about to feeling uncomfortable her  noticed that the Lerma catheter had been dislodged. She denies any abdominal pain. He states that he emptied out to urine collection bags earlier in the day. Nursing notes were reviewed. REVIEW OF SYSTEMS    (2-9 systems for level 4, 10 or more for level 5)     Review of Systems   Constitutional: Negative for fever. HENT: Negative for sinus pain. Respiratory: Negative for shortness of breath. Cardiovascular: Negative for chest pain. Gastrointestinal: Negative for abdominal pain. Genitourinary: Positive for difficulty urinating. Musculoskeletal: Negative for back pain.         Post op knee pain         PAST MEDICAL HISTORY     Past Medical History:   Diagnosis Date    Arthritis     CAD (coronary artery disease)     MI    Carotid stenosis     History of blood transfusion     s/p Rt TKR    Hyperlipidemia     Hypertension     Impaired mobility     recent falls - uses W/C    Kidney disease     stage 3 kidney disease    Mitral valve disease     and Aortic    OA (osteoarthritis)     Peripheral vascular disease (HCC)     Thyroid disease     Urinary frequency          SURGICALHISTORY       Past Surgical History:   Procedure Laterality Date    COLONOSCOPY  2/2012    CORONARY ANGIOPLASTY WITH STENT PLACEMENT      2009    ELBOW SURGERY      left    JOINT REPLACEMENT  8/30/11     right total knee replacement    TONSILLECTOMY      TOTAL KNEE ARTHROPLASTY Left 7/12/2021    LEFT TOTAL KNEE ARTHROPLASTY WITH ADDUCTOR CANAL BLOCK FOR PAIN CONTROL              MEDACTA performed by Aguila Rice MD at [facility] [place]       Previous Medications    AMLODIPINE (NORVASC) 5 MG TABLET    Take 5 mg by mouth 2 times daily. ASPIRIN 81 MG EC TABLET    Take 81 mg by mouth daily    ATORVASTATIN (LIPITOR) 40 MG TABLET    Take 40 mg by mouth nightly. CHOLECALCIFEROL (VITAMIN D3) 2000 UNIT CAPS    Take 2,000 Units by mouth daily. FENOFIBRATE (TRIGLIDE) 160 MG TABLET    Take 160 mg by mouth daily. LEVOTHYROXINE (SYNTHROID) 50 MCG TABLET    Take 50 mcg by mouth Daily     LISINOPRIL-HYDROCHLOROTHIAZIDE (PRINZIDE;ZESTORETIC) 20-12.5 MG PER TABLET    Take 1 tablet by mouth daily    METOPROLOL (LOPRESSOR) 25 MG TABLET    Take 25 mg by mouth 2 times daily. SENNOSIDES-DOCUSATE SODIUM (SENOKOT-S) 8.6-50 MG TABLET    Take 1 tablet by mouth 2 times daily    THERAPEUTIC MULTIVITAMIN-MINERALS (THERAGRAN-M) TABLET    Take 1 tablet by mouth daily. ALLERGIES     Patient has no known allergies.     FAMILY HISTORY       Family History   Problem Relation Age of Onset    Heart Disease Mother     Cancer Mother         ovarian    Cancer Father         luekemia          SOCIAL HISTORY       Social History     Socioeconomic History    Marital status:      Spouse name: None    Number of children: None    Years of education: None    Highest education level: None   Occupational History    None   Tobacco Use    Smoking status: Former Smoker    Smokeless tobacco: Never Used    Tobacco comment: smoked in her 25s    Vaping Use    Vaping Use: Never used   Substance and Sexual Activity    Alcohol use: Yes     Comment: occasional    Drug use: No    Sexual activity: Not Currently     Partners: Male   Other Topics Concern    None   Social History Narrative    None     Social Determinants of Health     Financial Resource Strain:     Difficulty of Paying Living Expenses:    Food Insecurity:     Worried About Running Out of Food in the Last Year:     Ran Out of Food in the Last Year:    Transportation Needs:     Lack of Transportation (Medical):  Lack of Transportation (Non-Medical):    Physical Activity:     Days of Exercise per Week:     Minutes of Exercise per Session:    Stress:     Feeling of Stress :    Social Connections:     Frequency of Communication with Friends and Family:     Frequency of Social Gatherings with Friends and Family:     Attends Gnosticist Services:     Active Member of Clubs or Organizations:     Attends Club or Organization Meetings:     Marital Status:    Intimate Partner Violence:     Fear of Current or Ex-Partner:     Emotionally Abused:     Physically Abused:     Sexually Abused:        SCREENINGS             PHYSICAL EXAM    (up to 7 for level 4, 8 or more for level 5)     ED Triage Vitals [08/16/21 0230]   BP Temp Temp Source Pulse Resp SpO2 Height Weight   (!) 171/72 98.6 °F (37 °C) Oral 77 16 95 % 5' 4\" (1.626 m) 169 lb (76.7 kg)       Physical Exam  Vitals and nursing note reviewed. Constitutional:       Appearance: Normal appearance. She is well-developed. She is not ill-appearing. HENT:      Head: Normocephalic and atraumatic. Right Ear: External ear normal.      Left Ear: External ear normal.      Nose: Nose normal.   Eyes:      General: No scleral icterus. Right eye: No discharge. Left eye: No discharge. Conjunctiva/sclera: Conjunctivae normal.   Cardiovascular:      Rate and Rhythm: Normal rate and regular rhythm. Heart sounds: Normal heart sounds.    Pulmonary: Effort: Pulmonary effort is normal. No respiratory distress. Breath sounds: Normal breath sounds. No wheezing or rales. Abdominal:      General: Bowel sounds are normal.      Palpations: There is mass. Comments: There is fullness to the lower abdomen no significant tenderness. Possible mass in the right lower quadrant. Musculoskeletal:      Cervical back: Neck supple. Comments: Left lower extremity is kept in a flexed position at the knee and patient has difficulty moving her knee following surgery. Skin:     Coloration: Skin is not pale. Comments: Surgical site appears clean dry and intact. No surrounding signs of infection. Neurological:      Mental Status: She is alert.    Psychiatric:         Mood and Affect: Mood normal.         Behavior: Behavior normal.             DIAGNOSTIC RESULTS     EKG: All EKG's are interpreted by the Emergency Department Physician who either signs or Co-signs this chart in the absence of a cardiologist.    12 lead EKG shows     RADIOLOGY:   Non-plain film images such as CT, Ultrasound and MRI are read by the radiologist. Plain radiographic images are visualized and preliminarily interpreted by the emergency physician with the below findings:        Interpretation per the Radiologist below, if available at the time of this note:    No orders to display         ED BEDSIDE ULTRASOUND:   Performed by ED Physician - none    LABS:  Labs Reviewed   CBC WITH AUTO DIFFERENTIAL - Abnormal; Notable for the following components:       Result Value    RBC 3.61 (*)     Hemoglobin 10.5 (*)     Hematocrit 31.3 (*)     Lymphocytes Absolute 0.7 (*)     All other components within normal limits    Narrative:     Performed at:  [address] [place] [address] [address],  [place], [address]   Phone [phone]   COMPREHENSIVE METABOLIC PANEL W/ REFLEX TO MG FOR LOW K - Abnormal; Notable for the following components:    Glucose 183 (*)     BUN 44 (*)     GFR Non- 47 (*)     GFR  62 (*)     All other components within normal limits    Narrative:     Performed at:  [address],  [place], [address]   Phone [phone]   URINE RT REFLEX TO CULTURE - Abnormal; Notable for the following components:    Blood, Urine MODERATE (*)     Protein,  (*)     All other components within normal limits    Narrative:     Performed at:  [address],  [place], [address]   Phone [phone]   MICROSCOPIC URINALYSIS - Abnormal; Notable for the following components:    WBC, UA 6-9 (*)     RBC, UA  (*)     Bacteria, UA Rare (*)     All other components within normal limits    Narrative:     Performed at:  [address],  [address], [place]   Phone [phone] TESTING    Narrative:     Performed at:  [address],  [place], [address]   Phone [phone]       All other labs were within normal range or not returned as of this dictation. EMERGENCY DEPARTMENT COURSE and DIFFERENTIAL DIAGNOSIS/MDM:   Vitals:    Vitals:    08/16/21 0230 08/16/21 0533   BP: (!) 171/72 (!) 174/70   Pulse: 77 82   Resp: 16    Temp: 98.6 °F (37 °C)    TempSrc: Oral    SpO2: 95% 94%   Weight: 169 lb (76.7 kg)    Height: 5' 4\" (1.626 m)        Elderly female who comes in for dislodged Lerma catheter. Bladder scan was performed by ancillary staff and I was told that the patient had greater than 900 cc in her bladder. A Lerma catheter was then ordered however once the Lerma was placed there was barely any drainage. This concerned me and therefore a bedside ED ultrasound was done. ED bedside ultrasound performed by myself shows Lerma bulb with constricted bladder on the ball there is a very large intra-abdominal mass.   There appears to be a solid complement in there is septation and cystic fluid-filled mass otherwise. I am concerned that there might be a bowel obstruction however the patient's chart is reviewed and back in July she had a very similar mass seen on CT scan and at that time there was concern for ovarian malignancy. I discussed our findings with the patient and her . The  said that they are aware of the mass however her team of doctors decided it was better to go forward with her planned for knee surgery prior to evaluating the mass further. Since her surgery however the patient has not been able to move. She was discharged from rehab on Saturday but the  has been unable to care for her because she is unable to ambulate. He is not able to take her to her doctor's appointment and is uncertain how he is going to continue caring for her and working up the mass further. I am concerned about this patient's wellbeing so a consult is placed to the hospitalist on duty and I discussed the case with him. Especially considering that the  is unable to care for the patient and she is unable to make it to her appointments we do believe is in the patient's best interest to be admitted to the hospital for further care and appropriate placement. The patient and her  are both agreeable with the plan for admission. Basic laboratory studies ordered. CRITICAL CARE TIME   None       CONSULTS:  IP CONSULT TO HOSPITALIST  IP CONSULT TO OB GYN    PROCEDURES:       Procedures    FINAL IMPRESSION      1. Displacement of Lerma catheter, initial encounter ([facility] [address]    2. Urinary retention    3. Generalized abdominal mass          DISPOSITION/PLAN   DISPOSITION Admitted 08/16/2021 04:23:45 AM      PATIENT REFERREDTO:  No follow-up provider specified.     DISCHARGEMEDICATIONS:  New Prescriptions    No medications on file          (Please note that portions of this note were completed with a voice recognition program.  Efforts were made to edit the dictations but occasionally words are mis-transcribed.)    Frida Huddleston MD (electronically signed)  Attending Emergency Physician        Frida Huddleston MD  08/16/21 0712

## 2021-08-16 NOTE — PROGRESS NOTES
Physical Therapy    Facility/Department: [facility] B3 - MED SURG  Initial Assessment/Treatment    NAME: Breonna Newman  : 1935  MRN: [id number]    Date of Service: 2021    Discharge Recommendations:  [address], [facility]   PT Equipment Recommendations  Other: Defer to facility    Assessment   Body structures, Functions, Activity limitations: Decreased functional mobility ; Decreased ROM; Decreased strength;Decreased endurance;Decreased balance  Assessment: Pt is an 81year old female with PMH including OA, CAD, HLD, HTN, cardiac stent, R TKA and L TKA in 2021 who presented after velazquez was dislodged at home. CT Abdomen/Pelvis demonstrated pelvic mass concerning for ovarian malignancy. Pt also demonstrates severe ROM deficits in L knee following TKA, lacking 45 degrees active extension with increased hip adductor and knee flexor tone which limit overall functional mobility. Pt reports it has been over 1 month since she has been able to ambulate and was discharged home performing slide board transfers after running out of SNF days. Pt is unsafe to return home given current level of assist, safety concerns (utilizing rolling desk chair to transfer pt to bathroom), and high fall risk. Pt would also benefit from continued therapy to address L knee ROM as able. Recommend SNF vs ARU (pt reports she ran out of SNF days). Treatment Diagnosis: Impaired L knee ROM and mobility  Prognosis: Fair  Decision Making: Medium Complexity  PT Education: Goals;Transfer Training;Disease Specific Education;PT Role;Functional Mobility Training;Plan of Care;General Safety;Pressure Relief  Patient Education: Pt educated regarding importance of positioning for L knee extension - will benefit from reinforcement. Barriers to Learning: Cognition  REQUIRES PT FOLLOW UP: Yes  Activity Tolerance  Activity Tolerance: Patient limited by endurance; Patient limited by fatigue  Activity Tolerance: /71, HR 90bpm, SPO2 96% on RA       Patient Diagnosis(es): The primary encounter diagnosis was Displacement of Velazquez catheter, initial encounter ([facility] [address] Diagnoses of Urinary retention and Generalized abdominal mass were also pertinent to this visit. has a past medical history of Arthritis, CAD (coronary artery disease), Carotid stenosis, History of blood transfusion, Hyperlipidemia, Hypertension, Impaired mobility, Kidney disease, Mitral valve disease, OA (osteoarthritis), Peripheral vascular disease ([address] Thyroid disease, and Urinary frequency. has a past surgical history that includes Tonsillectomy; Elbow surgery; Coronary angioplasty with stent; joint replacement (8/30/11 ); Colonoscopy (2/2012); and Total knee arthroplasty (Left, 7/12/2021). Restrictions  Restrictions/Precautions  Restrictions/Precautions: General Precautions, Fall Risk  Position Activity Restriction  Other position/activity restrictions: Recent TKA July 2021, up with assist, velazquez  Vision/Hearing  Vision: Impaired  Vision Exceptions: Wears glasses for reading  Hearing: Within functional limits     Subjective  General  Chart Reviewed: Yes  Patient assessed for rehabilitation services?: Yes  Response To Previous Treatment: Not applicable  Family / Caregiver Present: No  Referring Practitioner: LINDSAY Mohan CNP  Referral Date : 08/16/21  Diagnosis: Urinary retention  Follows Commands: Within Functional Limits  General Comment  Comments: RN clears for therapy  Subjective  Subjective: Pt encountered sitting EOB, agreeable to PT Evaluation.   Pain Screening  Patient Currently in Pain: Denies  Vital Signs  Patient Currently in Pain: Denies       Orientation  Orientation  Overall Orientation Status: Within Functional Limits  Social/Functional History  Social/Functional History  Lives With: Spouse ( Roxanne Salas))  Type of Home: House  Home Layout: Two level  Home Access: Stairs to enter with rails (Pt reports a stair lift)  Entrance Stairs - Number of Steps: 3 MALISSA  Entrance Stairs - Rails: Right  Bathroom Shower/Tub: Walk-in shower (2 inch MALISSA)  Bathroom Toilet: Standard  Bathroom Equipment: Grab bars around toilet, Tub transfer bench  Bathroom Accessibility: Walker accessible  Home Equipment: [address], 4 wheeled walker, Grab bars, Cane  ADL Assistance: Independent  Homemaking Assistance: Independent  Homemaking Responsibilities: Yes  Meal Prep Responsibility: Primary  Laundry Responsibility: Primary  Cleaning Responsibility: Primary  Shopping Responsibility: Primary  Ambulation Assistance: Needs assistance  Transfer Assistance: Needs assistance  Active : No  Patient's  Info:  Drives  Mode of Transportation: Car  Occupation: Retired  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 Following TKA, pt discharged to SNF, ran out of days and was discharged home < 1 day prior to return to acute hospital. She reports in that one day her  was transferring her to the bathroom utilizing a rolling desk chair as their W/C did not fit into the bathroom. Cognition   Cognition  Overall Cognitive Status: Exceptions  Arousal/Alertness: Appropriate responses to stimuli  Following Commands: Follows all commands without difficulty; Follows multistep commands with repitition  Attention Span: Appears intact  Memory: Decreased short term memory; Appears intact; Decreased recall of biographical Information  Safety Judgement: Good awareness of safety precautions  Problem Solving: Assistance required to generate solutions;Decreased awareness of errors  Insights: Decreased awareness of deficits  Initiation: Requires cues for some  Sequencing: Requires cues for some    Objective          AROM RLE (degrees)  RLE AROM: WFL  RLE General AROM: R knee lacking ~10 degreex extension  PROM LLE (degrees)  LLE PROM: Exceptions  L Knee Extension 0: With manual pressure, lacking ~30 degrees extension but unable to maintain. AROM LLE (degrees)  LLE AROM : Exceptions  L Knee Extension 0: Lacking ~45 degrees active extension in sitting. Strength RLE  Strength RLE: Exception  R Hip Flexion: 3+/5  R Knee Extension: 4/5  R Ankle Dorsiflexion: 4/5  Strength LLE  Strength LLE: Exception  L Hip Flexion: 4/5  L Ankle Dorsiflexion: 4/5     Sensation  Overall Sensation Status: Impaired (Reports numbness and tingling in B toes)  Bed mobility  Supine to Sit: Moderate assistance  Sit to Supine: Moderate assistance  Scootin Person assistance;Dependent/Total  Comment: Assist for BLE management into and out of bed. Total A to scoot up to [facility]. Transfers  Sit to Stand: Maximum Assistance;2 Person Assistance  Stand to sit: Maximum Assistance;2 Person Assistance  Comment: Pt performed 2 partial sit<>stand transfers from EOB with max A x1-2 with B knee block, unable to come to full stand secondary to L knee ROM deficits and impaired strength. Ambulation  Ambulation?: No (Pt has been nonambulatory for \"weeks\" per report)         Plan   Plan  Times per week: 2-4x/wk  Times per day: Daily  Current Treatment Recommendations: Strengthening, Transfer Training, Endurance Training, Patient/Caregiver Education & Training, ROM, Balance Training, Functional Mobility Training, Safety Education & Training, Positioning, Home Exercise Program  Safety Devices  Type of devices:  All fall risk precautions in place, Gait belt, Bed alarm in place, Call light within reach, Patient at risk for falls, Left in bed, Nurse notified  Restraints  Initially in place: No      AM-PAC Score     AM-PAC Inpatient Mobility without Stair Climbing Raw Score : 9 (21 1600)  AM-PAC Inpatient without Stair Climbing T-Scale Score : 32.44 (21 1600)  Mobility Inpatient CMS 0-100% Score: 76.07 (21 1600)  Mobility Inpatient without Stair CMS G-Code Modifier : CL (08/16/21 1600)       Goals  Short term goals  Time Frame for Short term goals: 1 week, 8/23/21 unless otherwise noted  Short term goal 1: Pt will perform bed mobility with CGA  Short term goal 2: Pt will perform bed<>chair transfer via slideboard with min A  Short term goal 3: By 8/19, pt will tolerate x12 reps BLE exercise to assist with ROM, strength and functional transfers. Patient Goals   Patient goals : \"To go home\"       Therapy Time   Individual Concurrent Group Co-treatment   Time In 1418         Time Out 1446         Minutes 28         Timed Code Treatment Minutes: 18 Minutes (10 min evaluation)       Davis Mazariegos PT     If pt is unable to be seen after this session, please let this note serve as discharge summary. Please see case management note for discharge disposition. Thank you.

## 2021-08-17 NOTE — CONSULTS
LEFT TOTAL KNEE ARTHROPLASTY WITH ADDUCTOR CANAL BLOCK FOR PAIN CONTROL              MEDACTA performed by Ken Montiel MD at [facility] CENTER OR       Past Gynecological History:    1. Last menstrual period:  Late 40's  2. Menses: age of menarche:  16years old  1. Contraception: None  4. Sexually transmitted disease history: none        A. Number of sexual partners in the last 6 months: 0    5. Pap History: No recent pap smear. Pap smear deferred--immobile. 6. Date of last mammogram: Patient does not recall date of the last mammogram.    OB History   No obstetric history on file. Medications Prior to Admission:   Medications Prior to Admission: aspirin 81 MG EC tablet, Take 81 mg by mouth daily  lisinopril-hydroCHLOROthiazide (PRINZIDE;ZESTORETIC) 20-12.5 MG per tablet, Take 1 tablet by mouth daily  sennosides-docusate sodium (SENOKOT-S) 8.6-50 MG tablet, Take 1 tablet by mouth 2 times daily (Patient taking differently: Take 1 tablet by mouth daily )  levothyroxine (SYNTHROID) 50 MCG tablet, Take 50 mcg by mouth Daily   therapeutic multivitamin-minerals (THERAGRAN-M) tablet, Take 1 tablet by mouth daily. fenofibrate (TRIGLIDE) 160 MG tablet, Take 160 mg by mouth daily. amlodipine (NORVASC) 5 MG tablet, Take 5 mg by mouth 2 times daily. metoprolol (LOPRESSOR) 25 MG tablet, Take 25 mg by mouth 2 times daily. atorvastatin (LIPITOR) 40 MG tablet, Take 40 mg by mouth nightly. Cholecalciferol (VITAMIN D3) 2000 UNIT CAPS, Take 2,000 Units by mouth daily. Allergies:  Patient has no known allergies. Social History:  TOBACCO:   reports that she has quit smoking. She has never used smokeless tobacco.  ETOH:   reports current alcohol use. DRUGS:   reports no history of drug use.     Family History:       Problem Relation Age of Onset    Heart Disease Mother     Cancer Mother         ovarian    Cancer Father         lulu       REVIEW OF SYSTEMS:      Constitutional:  negative for  fevers, chills, reformatted images are provided for review. Dose modulation, iterative reconstruction, and/or weight based adjustment of   the mA/kV was utilized to reduce the radiation dose to as low as reasonably   achievable.       COMPARISON:   None       HISTORY:   ORDERING SYSTEM PROVIDED HISTORY: Abdominal mass, unspecified abdominal   location   TECHNOLOGIST PROVIDED HISTORY:   Additional Contrast?->None   Reason for Exam: midline abd/pelvis lump/swelling, no symptoms   Acuity: Acute   Type of Exam: Initial       FINDINGS:   Lower Chest: No acute infiltrate at the lung bases.  Coronary vascular   calcification.       Organs: The unenhanced liver, spleen and adrenal glands are unremarkable. There is suggestion atrophic changes in the pancreatic tail with mild ductal   dilatation.  The remainder of the pancreas is unremarkable.  The gallbladder   is contracted with multiple calculi.  No acute biliary findings.  No evidence   of obstructive uropathy.  Bilateral renal lesions, most of which are too   small to fully characterize but are likely cysts.  1.5 cm right renal cyst is   noted.       GI/Bowel: Colonic diverticulosis with no acute features.  Mild retained stool   throughout the colon.       No small bowel distension.  Mild gastric distention.  The duodenal sweep is   unremarkable.       Pelvis: Large complex pelvic mass measuring approximately 12.5 x 21.9 x 11.0   cm.  There is a solid component within the lesion in the upper portion   measuring 7.5 x 8.1 cm.  The remainder of the mass consists of multiloculated   cystic areas. Roderick Honour is no free pelvic fluid.  The visualized uterus is   unremarkable.  Partial distention of the urinary bladder.       Peritoneum/Retroperitoneum: The abdominal aorta is normal in caliber. Moderate aortoiliac atherosclerotic plaque.  No significant retroperitoneal   or pelvic adenopathy.  No upper abdominal ascites.       Bones/Soft Tissues: No acute osseous or soft tissue abnormality.  Mild   edematous changes in the flanks.  Multilevel degenerative change within the   lower thoracic and lumbar spine.           Impression   1. Large mixed cystic and solid pelvic mass as outlined above.  Findings are   concerning for an ovarian malignancy.  Surgical consultation recommended.  If   further imaging evaluation is indicated, MRI is recommended as the lesion is   too large to evaluate with ultrasound. 2. Questionable atrophic changes in the distal pancreas with ductal   dilatation.  Recommend follow-up conventional CT with contrast.   Alternatively, if MRI is being performed for the pelvis, MRI of the abdomen   could also be obtained. 3. Cholelithiasis. 4. Colonic diverticulosis with no acute features.             IMPRESSION/RECOMMENDATIONS:      1. Pelvic mass--known--appears ovarian, concerning for malignancy  2. Urinary retention  3. Anemia  4. S/P right knee surgery--immobile  5. CAD    Tumor markers. Options reviewed with patient. Will follow up in AM to determine plan. Appreciate consultation. Caryl Hughes D.O.

## 2021-08-17 NOTE — TELEPHONE ENCOUNTER
Patients  calling and is asking to speak with you/and his wife in regards to her current issues. Please advise.      Routing to Dr. Andrea Pierre

## 2021-08-17 NOTE — PROGRESS NOTES
Hospitalist Progress Note      PCP: Terell Gilliam MD    Date of Admission: 8/16/2021    Chief Complaint: Dislodged Urinary Catheter. Subjective: no new c/o. Medications:  Reviewed    Infusion Medications    sodium chloride       Scheduled Medications    amLODIPine  5 mg Oral BID    atorvastatin  40 mg Oral Nightly    levothyroxine  50 mcg Oral Daily    metoprolol tartrate  25 mg Oral BID    sodium chloride flush  5-40 mL Intravenous 2 times per day    enoxaparin  40 mg Subcutaneous Daily     PRN Meds: sodium chloride flush, sodium chloride, ondansetron **OR** ondansetron, polyethylene glycol, acetaminophen **OR** acetaminophen, hydrALAZINE      Intake/Output Summary (Last 24 hours) at 8/17/2021 1002  Last data filed at 8/17/2021 0540  Gross per 24 hour   Intake 360 ml   Output 450 ml   Net -90 ml       Physical Exam Performed:    BP (!) 141/78   Pulse 90   Temp 97.6 °F (36.4 °C) (Oral)   Resp 18   Ht 5' 4\" (1.626 m)   Wt 169 lb (76.7 kg)   SpO2 (!) 5%   BMI 29.01 kg/m²     General appearance: No apparent distress, appears stated age and cooperative. HEENT: Pupils equal, round, and reactive to light. Conjunctivae/corneas clear. Neck: Supple, with full range of motion. No jugular venous distention. Trachea midline. Respiratory:  Normal respiratory effort. Clear to auscultation, bilaterally without Rales/Wheezes/Rhonchi. Cardiovascular: Regular rate and rhythm with normal S1/S2 without murmurs, rubs or gallops. Abdomen: Soft, non-tender, non-distended with normal bowel sounds. Musculoskeletal: No clubbing, cyanosis or edema bilaterally. Full range of motion without deformity. Skin: Skin color, texture, turgor normal.  No rashes or lesions. Neurologic:  Neurovascularly intact without any focal sensory/motor deficits.  Cranial nerves: II-XII intact, grossly non-focal.  Psychiatric: Alert and oriented, thought content appropriate, normal insight  Capillary Refill: Brisk,< 3 seconds   Peripheral Pulses: +2 palpable, equal bilaterally       Labs:   Recent Labs     08/16/21  0435 08/17/21  0702   WBC 7.0 6.7   HGB 10.5* 9.6*   HCT 31.3* 28.5*    186     Recent Labs     08/16/21  0435 08/17/21  0702    140   K 3.9 3.9    107   CO2 22 22   BUN 44* 42*   CREATININE 1.1 1.1   CALCIUM 9.9 9.2     Recent Labs     08/16/21 0435   AST 33   ALT 17   BILITOT 0.8   ALKPHOS 98     No results for input(s): INR in the last 72 hours. No results for input(s): Isra Clap in the last 72 hours. Urinalysis:      Lab Results   Component Value Date    NITRU Negative 08/16/2021    WBCUA 6-9 08/16/2021    BACTERIA Rare 08/16/2021    RBCUA  08/16/2021    BLOODU MODERATE 08/16/2021    SPECGRAV 1.025 08/16/2021    GLUCOSEU Negative 08/16/2021    GLUCOSEU NEGATIVE 08/18/2011       Consults:    IP CONSULT TO HOSPITALIST  IP CONSULT TO OB GYN      Assessment/Plan:    Active Hospital Problems    Diagnosis     Generalized abdominal mass [R19.07]     CAD (coronary artery disease) [I25.10]     Hyperlipidemia [E78.5]     Hypertension [I10]     CKD (chronic kidney disease) [N18.9]     Pelvic mass [R19.00]        Abdominal Mass - CT demonstrated possible neoplasm of uncertain behavior, possible c/w Ovarian malignancy. GYN Onc consulted and appreciated. Tumor markers ordered and pending. HTN/CAD - w/ known CAD but no evidence of active signs/sxs of ischemia/failure. Currently controlled on home meds w/ vitals reviewed and documented as above. HyperLipidemia - controlled on home Statin. Continue, w/ f/u and med adjustment w/ PCP    HypoThyroid - clinically euthyroid on oral replacement therapy. Continue, w/ outpt monitoring as previously arranged. CKD - baseline stage 2. Follow serial labs. Reviewed and documented as above. Anemia - chronic normocytic w/ etiology clinically unable to determine, w/out evidence of active bleeding/hemolysis.  Asymptomatic w/out indication

## 2021-08-17 NOTE — PROGRESS NOTES
Department of Gynecology  Attending Progress Note          SUBJECTIVE:    The patient is a 81 y.o. [id number] female who presented on 8/16/2021 for consult secondary to pelvic mass. Patient admitted for observation secondary to  urinary retention. Urinary retention has been an issue for the past month. Lerma catheter placed 1 month ago came out last evening and patient presented to ER for urinary issues. Lerma catheter was replaced upon arrival to ER. Urinary retention is felt to be secondary to large pelvic mass seen on CT on 7/7/2021. Patient was unaware of mass until diagnosed by primary care during preop clearance for recent knee surgery--early July. Patient has been immobile since right knee surgery and has not been able to follow up with primary care or make appointment with GYN/ONC. It is unclear if she has established with Gynecology. Patient denies vaginal bleeding, vaginal discharge and pelvic pain. Family history is positive for mother with ovarian cancer--diagnosed in her 80's  OBJECTIVE:           Physical Exam  VITALS:  BP (!) 141/78   Pulse 90   Temp 97.6 °F (36.4 °C) (Oral)   Resp 18   Ht 5' 4\" (1.626 m)   Wt 169 lb (76.7 kg)   SpO2 (!) 5%   BMI 29.01 kg/m²   CONSTITUTIONAL:  awake, alert, cooperative, no apparent distress, and appears stated age    DATA:    EXAMINATION:   CT OF THE ABDOMEN AND PELVIS WITHOUT CONTRAST 7/7/2021 2:43 pm       TECHNIQUE:   CT of the abdomen and pelvis was performed without the administration of   intravenous contrast. Multiplanar reformatted images are provided for review.    Dose modulation, iterative reconstruction, and/or weight based adjustment of   the mA/kV was utilized to reduce the radiation dose to as low as reasonably   achievable.       COMPARISON:   None       HISTORY:   ORDERING SYSTEM PROVIDED HISTORY: Abdominal mass, unspecified abdominal   location   TECHNOLOGIST PROVIDED HISTORY:   Additional Contrast?->None   Reason for Exam: midline abd/pelvis lump/swelling, no symptoms   Acuity: Acute   Type of Exam: Initial       FINDINGS:   Lower Chest: No acute infiltrate at the lung bases.  Coronary vascular   calcification.       Organs: The unenhanced liver, spleen and adrenal glands are unremarkable. There is suggestion atrophic changes in the pancreatic tail with mild ductal   dilatation.  The remainder of the pancreas is unremarkable.  The gallbladder   is contracted with multiple calculi.  No acute biliary findings.  No evidence   of obstructive uropathy.  Bilateral renal lesions, most of which are too   small to fully characterize but are likely cysts.  1.5 cm right renal cyst is   noted.       GI/Bowel: Colonic diverticulosis with no acute features.  Mild retained stool   throughout the colon.       No small bowel distension.  Mild gastric distention.  The duodenal sweep is   unremarkable.       Pelvis: Large complex pelvic mass measuring approximately 12.5 x 21.9 x 11.0   cm.  There is a solid component within the lesion in the upper portion   measuring 7.5 x 8.1 cm.  The remainder of the mass consists of multiloculated   cystic areas. Chriss Maame is no free pelvic fluid.  The visualized uterus is   unremarkable.  Partial distention of the urinary bladder.       Peritoneum/Retroperitoneum: The abdominal aorta is normal in caliber. Moderate aortoiliac atherosclerotic plaque.  No significant retroperitoneal   or pelvic adenopathy.  No upper abdominal ascites.       Bones/Soft Tissues: No acute osseous or soft tissue abnormality.  Mild   edematous changes in the flanks.  Multilevel degenerative change within the   lower thoracic and lumbar spine.           Impression   1. Large mixed cystic and solid pelvic mass as outlined above.  Findings are   concerning for an ovarian malignancy.  Surgical consultation recommended.  If   further imaging evaluation is indicated, MRI is recommended as the lesion is   too large to evaluate with ultrasound.    2. Questionable atrophic changes in the distal pancreas with ductal   dilatation.  Recommend follow-up conventional CT with contrast.   Alternatively, if MRI is being performed for the pelvis, MRI of the abdomen   could also be obtained. 3. Cholelithiasis. 4. Colonic diverticulosis with no acute features.           ASSESSMENT AND PLAN:  1. Pelvic mass--known--appears ovarian, concerning for malignancy  2. Urinary retention  3. Anemia  4. S/P right knee surgery--immobile  5. CAD       Plan:   Discussed options with admitting physician, patient and patient's . Patient will require evaluation and treatment per GYN/ONC. GYN/ONC is currently unavailable for an unforeseen length of time therefore will transfer to facility where service is available. Discussed case with Dr. Andreas Aguero who is amenable to transfer to [facility] for intervention. Appreciate support from Dr. Andreas Aguero, [facility]. Caryl Hughes D.O.

## 2021-08-17 NOTE — PROGRESS NOTES
Pt to be transferred to [facility] room 0760 for GYN/ONC. Report called to [address] all questions answered. Transport by first care to occur at [facility] was set up though [id number] and that number can be contacted if there are questions about transport.

## 2021-08-17 NOTE — PROGRESS NOTES
[id number] contacted to estimate transfer. No estimation available at this time. May call back to receive estimate.

## 2021-08-17 NOTE — PLAN OF CARE
Resting quietly in bed. Denies pain/discomfort. No signs of distress noted. Monitored frequently.     Problem: Falls - Risk of:  Goal: Will remain free from falls  Description: Will remain free from falls  Outcome: Ongoing  Goal: Absence of physical injury  Description: Absence of physical injury  Outcome: Ongoing     Problem: Pain:  Goal: Pain level will decrease  Description: Pain level will decrease  Outcome: Ongoing  Goal: Control of acute pain  Description: Control of acute pain  Outcome: Ongoing  Goal: Control of chronic pain  Description: Control of chronic pain  Outcome: Ongoing     Problem: Skin Integrity:  Goal: Will show no infection signs and symptoms  Description: Will show no infection signs and symptoms  Outcome: Ongoing  Goal: Absence of new skin breakdown  Description: Absence of new skin breakdown  Outcome: Ongoing unable to assess

## 2021-08-17 NOTE — TELEPHONE ENCOUNTER
Discussed Perla's current care and recommendations while rounding on 3B this afternoon.  was on the phone throughout the discussion. See inpatient notes.

## 2021-08-18 NOTE — PROGRESS NOTES
Transport company here to transfer pt to other facility. No significant change in pt condition noted. Pt's  still at bedside and aware of transfer. Report given to transporters.

## 2021-08-18 NOTE — DISCHARGE SUMMARY
Hospital Medicine Discharge Summary    Patient ID: Jo Bernheim      Patient's PCP: Susie Subramanian MD    Admit Date: 8/16/2021     Discharge Date: 8/17/2021      Admitting Physician: Nata Velazquez MD     Discharge Physician: Nata Velazquez MD     Discharge Diagnoses: Active Hospital Problems    Diagnosis     Generalized abdominal mass [R19.07]     CAD (coronary artery disease) [I25.10]     Hyperlipidemia [E78.5]     Hypertension [I10]     CKD (chronic kidney disease) [N18.9]     Pelvic mass [R19.00]        The patient was seen and examined on day of discharge and this discharge summary is in conjunction with any daily progress note from day of discharge. Hospital Course:       Abdominal Mass - CT demonstrated possible neoplasm of uncertain behavior, possible c/w Ovarian malignancy. GYN consulted and appreciated efforts in transfer to GYN/Onc at [facility] - Dr Vicky Valencia. Tumor markers ordered and pending.      HTN/CAD - w/ known CAD but no evidence of active signs/sxs of ischemia/failure. Currently controlled on home meds      HyperLipidemia - controlled on home Statin. Continue, w/ f/u and med adjustment w/ PCP     HypoThyroid - clinically euthyroid on oral replacement therapy. Continue, w/ outpt monitoring as previously arranged.      CKD - baseline stage 2. Followed serial labs.       Anemia - chronic normocytic w/ etiology clinically unable to determine, w/out evidence of active bleeding/hemolysis. Clinically stable and asymptomatic w/out indication for transfusion.      Urinary Retention - s/p recent L TKA w/ velazquez placed. Dislodged and bladder scan revealed continued retention - replaced. Plan to transfer w/ velazquez and outpt f/u.         Labs:  For convenience and continuity at follow-up the following most recent labs are provided:      CBC:    Lab Results   Component Value Date    WBC 6.7 08/17/2021    HGB 9.6 08/17/2021    HCT 28.5 08/17/2021     08/17/2021       Renal:    Lab Results   Component Value Date     08/17/2021    K 3.9 08/17/2021     08/17/2021    CO2 22 08/17/2021    BUN 42 08/17/2021    CREATININE 1.1 08/17/2021    CALCIUM 9.2 08/17/2021    PHOS 3.4 10/13/2014         Significant Diagnostic Studies    Radiology:   No orders to display          Consults:     IP CONSULT TO HOSPITALIST  IP CONSULT TO OB GYN    Disposition: [facility]     Condition at Discharge: Stable    Discharge Instructions/Follow-up:  w/ PCP 1-2 weeks and subspecialists as arranged. Code Status:  Full Code    Activity: activity as tolerated    Diet: regular diet      Discharge Medications:     Discharge Medication List as of 8/17/2021  6:22 PM           Details   aspirin 81 MG EC tablet Take 81 mg by mouth dailyHistorical Med      lisinopril-hydroCHLOROthiazide (PRINZIDE;ZESTORETIC) 20-12.5 MG per tablet Take 1 tablet by mouth dailyHistorical Med      sennosides-docusate sodium (SENOKOT-S) 8.6-50 MG tablet Take 1 tablet by mouth 2 times dailyOTC      levothyroxine (SYNTHROID) 50 MCG tablet Take 50 mcg by mouth Daily , R-1Historical Med      therapeutic multivitamin-minerals (THERAGRAN-M) tablet Take 1 tablet by mouth daily. Historical Med      fenofibrate (TRIGLIDE) 160 MG tablet Take 160 mg by mouth daily. Historical Med      amlodipine (NORVASC) 5 MG tablet Take 5 mg by mouth 2 times daily. Historical Med      metoprolol (LOPRESSOR) 25 MG tablet Take 25 mg by mouth 2 times daily. Historical Med      atorvastatin (LIPITOR) 40 MG tablet Take 40 mg by mouth nightly. Historical Med      Cholecalciferol (VITAMIN D3) 2000 UNIT CAPS Take 2,000 Units by mouth daily. Historical Med             Time Spent on discharge is more than 30 minutes in the examination, evaluation, counseling and review of medications and discharge plan.       Signed:    Hanane Jiménez MD   8/18/2021      Thank you Shiraz Benavides MD for the opportunity to be involved in this patient's care. If you have any questions or concerns please feel free to contact me at [phone].

## 2021-08-25 NOTE — CARE COORDINATION
[facility] and stated that patient discharged yesterday. Called patient for updates. Left message for return call.   Nany Vergara BSN  Care Transition Nurse for ortho bundle  [phone]

## 2021-08-31 NOTE — CARE COORDINATION
Called patient for updates. Left message for return call.   Evon Clayton BSN  Care Transition Nurse for ortho bundle  [phone]

## 2021-09-09 NOTE — CARE COORDINATION
Received message from patient's spouse that patient has entered a rehab facility, and is not sure when patient is coming home.   Indira Peck BSN  Care Transition Nurse for ortho bundle  [phone]

## 2021-10-04 NOTE — LETTER
[address] [place] [address] [address] [address]  Phone: [phone]  Fax: [phone]    Dayna Deanl        October 4, 2021     Patient: Cesar Higginbotham   YOB: 1935   Date of Visit: 10/4/2021       To Whom it May Concern: Enrique Dhillon was seen in my clinic on 10/4/2021. It is my recommendation that the patient obtain a THOMAS brace or Dynasplint s/p LEFT TKR 7/12/21. Dx: Arthrofibrosis of knee joint, left [M24.662]    Rep contact information: [email or web address]    If you have any questions or concerns, please don't hesitate to call.     Sincerely,     Fred Mckeon PA-C

## 2021-10-04 NOTE — PROGRESS NOTES
Dr Brandon Olea      Date /Time 10/4/2021       2:34 PM EDT  Name Shelby Fox             1935   Location  [facility]  MRN [id number]                Chief Complaint   Patient presents with    Knee Pain     CK LEFT TKR 7/12/21        History of Present Illness      Shelby Fox is a 81 y.o. female is here for post-op visit after LEFT  [address]51 Total Knee Arthroplasty    Patient presents to the office today for a follow-up visit. Patient is almost 3 months status post left total knee arthroplasty. She was seen on July 29, 2021 for her first postoperative visit. Patient was scheduled to follow-up in the office 3 weeks later. She unfortunately required a second surgery for an abdominal mass and has been at [facility] ever since. Physical Exam    Based off 1997 Exam Criteria    Ht 5' 4\" (1.626 m)   Wt 169 lb (76.7 kg)   BMI 29.01 kg/m²      Constitutional:       General: He is not in acute distress. Appearance: Normal appearance. LEFT Knee: incision clean, intact, healing appropriately. No surrounding  erythema or fluctuance. Neuro intact distal. No evidence of DVT. Active and passive range of motion     Imaging       Left Knee: [address] [facility]  Radiographs: X-rays were ordered today reviewed of the left knee. 3 views. Standing AP, lateral, and skyline views. They demonstrate a left total knee arthroplasty unchanged in position. No evidence of loosening or periprosthetic fracture. Assessment and Plan    Safia Judge was seen today for knee pain. Diagnoses and all orders for this visit:    S/P TKR (total knee replacement), left  -     XR KNEE LEFT (3 VIEWS); Future    Arthrofibrosis of ankle joint, left        Patient's main issue at this time is arthrofibrosis after total knee arthroplasty. She is nearly 3 months from surgery and have only seen her once. We will ask the nursing home to order her a Dynasplint.   We have provided a prescription and the representative from the brace company's email. This is a brace aimed at obtaining full extension. We will see the patient back in 3 weeks with Dr. Ortega Smith or sooner problems arise. At the end of the visit patient was complaining about fatigue and just a general not feeling well. This is been present for several weeks. I have asked if she has discussed it with the house doctor over at [facility] and she is not sure. I have recommended that she discuss it with the general medical doctor at [facility] and also her abdominal surgeon. She did have a complete hysterectomy and does need to see them in follow-up. I will follow-up with general surgeon within the next 2 days or sooner if problems arise. While I was attempting to arrange the above-mentioned brace another individual during the party. We assumed it was a child of hers. Upon questioning the patient the new individual is from [employer].  states we are just taking care of a business transaction and will be done soon. Electronically signed by Carole Barber PA-C on 10/4/2021 at 2:34 PM  This dictation was generated by voice recognition computer software. Although all attempts are made to edit the dictation for accuracy, there may be errors in the transcription that are not intended.

## 2021-10-04 NOTE — CARE COORDINATION
Spoke with patient's spouse. States that patient is at [facility]. States that he feels patient is declining. Concerned about spouse and may request she be transferred to ED to be evaluated. Will discuss with physician today.   Alexus Mckeon BSN  Care Transition Nurse for [facility]  [phone]

## 2021-10-04 NOTE — LETTER
[address] [place] [address] [address] [facility] [address]  Phone: [phone]  Fax: [phone]    Lohman Antoine        October 4, 2021     Patient: Roger Banks   YOB: 1935   Date of Visit: 10/4/2021       To Whom it May Concern: Jenny Salinas was seen in my clinic on 10/4/2021. It is my recommendation that the patient obtain a THOMAS brace or Dynasplint s/p LEFT TKR 7/12/21. Dx: Arthrofibrosis of ankle joint, left [M24.672]    Rep contact information: [email or web address]    If you have any questions or concerns, please don't hesitate to call.     Sincerely,     Aaron Barragan PA-C

## 2021-10-11 PROBLEM — J18.9 HCAP (HEALTHCARE-ASSOCIATED PNEUMONIA): Status: ACTIVE | Noted: 2021-01-01

## 2021-10-11 PROBLEM — I34.0 SEVERE MITRAL REGURGITATION: Status: ACTIVE | Noted: 2021-01-01

## 2021-10-11 PROBLEM — A41.9 SEPSIS (HCC): Status: ACTIVE | Noted: 2021-01-01

## 2021-10-11 PROBLEM — N17.9 ACUTE KIDNEY INJURY SUPERIMPOSED ON CKD (HCC): Status: ACTIVE | Noted: 2021-01-01

## 2021-10-11 PROBLEM — N18.31 STAGE 3A CHRONIC KIDNEY DISEASE (HCC): Status: ACTIVE | Noted: 2021-01-01

## 2021-10-11 PROBLEM — I48.91 NEW ONSET ATRIAL FIBRILLATION (HCC): Status: ACTIVE | Noted: 2021-01-01

## 2021-10-11 PROBLEM — E11.65 HYPERGLYCEMIC CRISIS IN DIABETES MELLITUS (HCC): Status: ACTIVE | Noted: 2021-01-01

## 2021-10-11 PROBLEM — E11.00 TYPE 2 DIABETES MELLITUS WITH HYPEROSMOLARITY (HCC): Status: ACTIVE | Noted: 2021-01-01

## 2021-10-11 PROBLEM — N18.9 ACUTE KIDNEY INJURY SUPERIMPOSED ON CKD (HCC): Status: ACTIVE | Noted: 2021-01-01

## 2021-10-11 NOTE — ED TRIAGE NOTES
Sent from NH for possible pneumonia-denies SOB although SOB reported per [facility]

## 2021-10-11 NOTE — ED PROVIDER NOTES
[facility] Emergency Department    CHIEF COMPLAINT  Shortness of Breath (pt arrived from [facility] with c/o SOB; normally on rm air- pt was placed on [email or web address] at [facility]; recent xray-suspected pneumonia)      SHARED SERVICE VISIT  I have seen and evaluated this patient with my supervising physician, Dr. Gary Zayas. HISTORY OF PRESENT ILLNESS  Jessica Alexander is a 81 y.o. female who presents to the ED complaining of shortness of breath and left knee pain. Patient brought in by squad. From local nursing home. Had had some shortness of breath with mild cough. Nonproductive. No hemoptysis. Had recent chest x-ray done outpatient concerning for pneumonia. Not currently on antibiotics. She denies fevers chills. No chest pain. No pain with deep breaths. She denies any headaches, lightheadedness, dizziness confusion. No abdominal pain. No nausea or vomiting. No diarrhea or constipation. Has had no urinary symptoms. No sick contacts. 1 to 2 months status post left knee replacement. Does have some discomfort. No recurrent trauma or injury. No numbness, tingling, weakness. No other complaints, modifying factors or associated symptoms. Nursing notes reviewed.    Past Medical History:   Diagnosis Date    Arthritis     CAD (coronary artery disease)     MI    Carotid stenosis     History of blood transfusion     s/p Rt TKR    Hyperlipidemia     Hypertension     Impaired mobility     recent falls - uses W/C    Kidney disease     stage 3 kidney disease    Mitral valve disease     and Aortic    OA (osteoarthritis)     Peripheral vascular disease (HCC)     Thyroid disease     Urinary frequency      Past Surgical History:   Procedure Laterality Date    COLONOSCOPY  2/2012    CORONARY ANGIOPLASTY WITH STENT PLACEMENT      2009    ELBOW SURGERY      left    JOINT REPLACEMENT  8/30/11     right total knee replacement    TONSILLECTOMY      TOTAL KNEE ARTHROPLASTY Left 7/12/2021    LEFT TOTAL KNEE ARTHROPLASTY WITH ADDUCTOR CANAL BLOCK FOR PAIN CONTROL              MEDACTA performed by Nicolas Veloz MD at [facility] OR     Family History   Problem Relation Age of Onset    Heart Disease Mother     Cancer Mother         ovarian    Cancer Father         lulu     Social History     Socioeconomic History    Marital status:      Spouse name: Not on file    Number of children: Not on file    Years of education: Not on file    Highest education level: Not on file   Occupational History    Not on file   Tobacco Use    Smoking status: Former Smoker    Smokeless tobacco: Never Used    Tobacco comment: smoked in her 25s    Vaping Use    Vaping Use: Never used   Substance and Sexual Activity    Alcohol use: Yes     Comment: occasional    Drug use: No    Sexual activity: Not Currently     Partners: Male   Other Topics Concern    Not on file   Social History Narrative    Not on file     Social Determinants of Health     Financial Resource Strain:     Difficulty of Paying Living Expenses:    Food Insecurity:     Worried About Running Out of Food in the Last Year:     Ran Out of Food in the Last Year:    Transportation Needs:     Lack of Transportation (Medical):      Lack of Transportation (Non-Medical):    Physical Activity:     Days of Exercise per Week:     Minutes of Exercise per Session:    Stress:     Feeling of Stress :    Social Connections:     Frequency of Communication with Friends and Family:     Frequency of Social Gatherings with Friends and Family:     Attends Orthodoxy Services:     Active Member of Clubs or Organizations:     Attends Club or Organization Meetings:     Marital Status:    Intimate Partner Violence:     Fear of Current or Ex-Partner:     Emotionally Abused:     Physically Abused:     Sexually Abused:      Current Facility-Administered Medications   Medication Dose Route Frequency Provider Last Rate Last Admin    0.9 % sodium chloride bolus  1,000 mL IntraVENous Once Hollie Downey        insulin regular (HUMULIN R;NOVOLIN R) injection 10 Units  10 Units SubCUTAneous Once Hollie Downey        calcium gluconate 1000 mg in sodium chloride 50 mL  1,000 mg IntraVENous Once Hollie Downey        sodium zirconium cyclosilicate [facility]) oral suspension 10 g  10 g Oral TID MERYL Downey         Current Outpatient Medications   Medication Sig Dispense Refill    acetaminophen (TYLENOL) 325 MG tablet Take 650 mg by mouth every 6 hours as needed for Pain      polyethylene glycol (MIRALAX) 17 g PACK packet Take 17 g by mouth daily      nitroGLYCERIN (NITROSTAT) 0.4 MG SL tablet Place 0.4 mg under the tongue every 5 minutes as needed for Chest pain up to max of 3 total doses. If no relief after 1 dose, call 911.  oxyCODONE (ROXICODONE) 5 MG immediate release tablet Take 5 mg by mouth every 6 hours as needed for Pain.  aspirin 81 MG EC tablet Take 81 mg by mouth daily      lisinopril-hydroCHLOROthiazide (PRINZIDE;ZESTORETIC) 20-12.5 MG per tablet Take 1 tablet by mouth daily      sennosides-docusate sodium (SENOKOT-S) 8.6-50 MG tablet Take 1 tablet by mouth 2 times daily (Patient taking differently: Take 1 tablet by mouth daily )      levothyroxine (SYNTHROID) 50 MCG tablet Take 50 mcg by mouth Daily   1    therapeutic multivitamin-minerals (THERAGRAN-M) tablet Take 1 tablet by mouth daily.  fenofibrate (TRIGLIDE) 160 MG tablet Take 160 mg by mouth daily.  amlodipine (NORVASC) 5 MG tablet Take 5 mg by mouth 2 times daily.  metoprolol (LOPRESSOR) 25 MG tablet Take 25 mg by mouth 2 times daily.  atorvastatin (LIPITOR) 40 MG tablet Take 40 mg by mouth nightly.  Cholecalciferol (VITAMIN D3) 2000 UNIT CAPS Take 2,000 Units by mouth daily.          No Known Allergies    REVIEW OF SYSTEMS  10 systems reviewed, pertinent positives per HPI otherwise noted to be negative    PHYSICAL EXAM  BP which includes COVID pneumonia. RECOMMENDATION: Follow-up is recommended preferably with upright PA and lateral views when feasible. ED COURSE  Pain control was not required while here in the emergency department. Triage vitals stable. CBC showing a leukocytosis at 14.4 with left shift. No bandemia. Hemoglobin 9.2 and 28.3 which appears fairly baseline. CMP suggestive of some dehydration with elevated BUN and creatinine at 129 and 1.8. Blood glucose 778. No anion gap. Potassium 5.5. She was given a 1 L IV fluid bolus, 10 units of insulin, 1 g of calcium gluconate and 5 g of Lokelma. Normal magnesium. Troponin was elevated at 0.16. Given dose of aspirin. Chest x-ray with moderate bilateral airspace disease concerning for multifocal pneumonia. Patient did have a lactic acidosis of 2.9. Procalcitonin over 2. She was initiated on Zyvox and cefepime as she appears to be acute on chronic renal failure as well. While she does meet sepsis criteria did not feel comfortable with 30 mL/kg IV fluid boluses patient is already complaining of shortness of breath with a BNP of approximately 11,000. Rapid Covid negative. Given symptomology will obtain PCR although with elevated procalcitonin this does appear to be more of a bacterial multifocal pneumonia. Repeat troponin now 0.15 without significant change in EKG which appears to still be in A. fib pattern. Discussed case with cardiology who recommends continued trending of troponins. VBG was unremarkable. Repeat lactic now 4.1. Blood sugars have also been fairly resistant despite fluids and insulin. Discussed case with hospital medicine regarding admission and orders placed. A discussion was had with MsFredrick Emiliana Brinda regarding shortness of breath and cough, fatigue, ED findings and recommendations for admission. Risk management discussed and shared decision making had with patient and/or surrogate. All questions were answered.  Patient in agreement    CRITICAL CARE TIME  75 Minutes of critical care time spent not including separately billable procedures. MDM  Results for orders placed or performed during the hospital encounter of 10/11/21   Culture, Blood 2    Specimen: Blood   Result Value Ref Range    Culture, Blood 2 (A)      Gram stain Aerobic bottle:  Gram positive cocci in chains and/or pairs  resembling Streptococcus  Information to follow  Gram stain Anaerobic bottle:  Gram positive cocci in chains and/or pairs  resembling Streptococcus  Information to follow      Organism Enterococcus faecalis DNA Detected (A)     Culture, Blood 2       Анна/B gene not detected. See additional report for complete BCID panel. Negative results for this marker does not indicate susceptibility,  as multiple mechanisms of resistance to Vancomycin exist.  Please see full susceptibility report.       Organism Enterococcus faecalis (A)     Culture, Blood 2       POSITIVE for  Sensitivity to follow  Isolated two of two sets     COVID-19, Rapid    Specimen: Nasopharyngeal Swab   Result Value Ref Range    SARS-CoV-2, NAAT Not Detected Not Detected   Culture, Blood 1    Specimen: Blood   Result Value Ref Range    Blood Culture, Routine (A)      Gram stain Aerobic bottle:  Gram positive cocci in chains and/or pairs  resembling Streptococcus  Information to follow  Gram stain Anaerobic bottle:  Gram positive cocci in chains and/or pairs  resembling Streptococcus  Information to follow      Organism Enterococcus faecalis (A)     Blood Culture, Routine       POSITIVE for  No further workup  Isolated two of two sets  Refer to culture [id number] for sensitivity results     Culture, Blood, PCR ID Panel Results Report   Result Value Ref Range    Report SEE IMAGE    CBC Auto Differential   Result Value Ref Range    WBC 14.4 (H) 4.0 - 11.0 K/uL    RBC 3.36 (L) 4.00 - 5.20 M/uL    Hemoglobin 9.2 (L) 12.0 - 16.0 g/dL    Hematocrit 28.3 (L) 36.0 - 48.0 %    MCV 84.4 80.0 - 100.0 fL    MCH 27.3 26.0 - 34.0 pg    MCHC 32.4 31.0 - 36.0 g/dL    RDW 18.4 (H) 12.4 - 15.4 %    Platelets 498 124 - 927 K/uL    MPV 9.5 5.0 - 10.5 fL    Neutrophils % 93.2 %    Lymphocytes % 3.2 %    Monocytes % 3.5 %    Eosinophils % 0.0 %    Basophils % 0.1 %    Neutrophils Absolute 13.4 (H) 1.7 - 7.7 K/uL    Lymphocytes Absolute 0.5 (L) 1.0 - 5.1 K/uL    Monocytes Absolute 0.5 0.0 - 1.3 K/uL    Eosinophils Absolute 0.0 0.0 - 0.6 K/uL    Basophils Absolute 0.0 0.0 - 0.2 K/uL   Comprehensive metabolic panel   Result Value Ref Range    Sodium 124 (L) 136 - 145 mmol/L    Potassium 5.5 (H) 3.5 - 5.1 mmol/L    Chloride 90 (L) 99 - 110 mmol/L    CO2 25 21 - 32 mmol/L    Anion Gap 9 3 - 16    Glucose 778 (HH) 70 - 99 mg/dL     (HH) 7 - 20 mg/dL    CREATININE 1.8 (H) 0.6 - 1.2 mg/dL    GFR Non-African American 27 (A) >60    GFR  32 (A) >60    Calcium 8.9 8.3 - 10.6 mg/dL    Total Protein 5.7 (L) 6.4 - 8.2 g/dL    Albumin 2.3 (L) 3.4 - 5.0 g/dL    Albumin/Globulin Ratio 0.7 (L) 1.1 - 2.2    Total Bilirubin 1.0 0.0 - 1.0 mg/dL    Alkaline Phosphatase 94 40 - 129 U/L     (H) 10 - 40 U/L    AST 89 (H) 15 - 37 U/L    Globulin 3.4 Not Established g/dL   Troponin   Result Value Ref Range    Troponin 0.16 (H) <0.01 ng/mL   Blood gas, venous   Result Value Ref Range    pH, Dewayne 7.489 (H) 7.350 - 7.450    pCO2, Dewayne 30.0 (L) 40.0 - 50.0 mmHg    pO2, Dewayne 115.1 (H) 25 - 40 mmHg    HCO3, Venous 22.3 (L) 23.0 - 29.0 mmol/L    Base Excess, Dewayne -0.5 -3.0 - 3.0 mmol/L    O2 Sat, Dewayne 98 Not Established %    Carboxyhemoglobin 2.4 (H) 0.0 - 1.5 %    MetHgb, Dewayne 0.6 <1.5 %    TC02 (Calc), Dewayne 23 Not Established mmol/L    O2 Therapy Unknown    Lactic acid, plasma   Result Value Ref Range    Lactic Acid 2.9 (H) 0.4 - 2.0 mmol/L   Brain Natriuretic Peptide   Result Value Ref Range    Pro-BNP 11,334 (H) 0 - 449 pg/mL   Magnesium   Result Value Ref Range    Magnesium 2.10 1.80 - 2.40 mg/dL   TSH with Reflex   Result Value Ref Range    TSH 3.60 0.27 - 4.20 uIU/mL   Procalcitonin   Result Value Ref Range    Procalcitonin 2.19 (H) 0.00 - 0.15 ng/mL   Troponin   Result Value Ref Range    Troponin 0.15 (H) <0.01 ng/mL   Glucose   Result Value Ref Range    Glucose 729 (HH) 70 - 99 mg/dL   Urinalysis Reflex to Culture    Specimen: Urine, clean catch   Result Value Ref Range    Color, UA Yellow Straw/Yellow    Clarity, UA Clear Clear    Glucose, Ur 500 (A) Negative mg/dL    Bilirubin Urine Negative Negative    Ketones, Urine Negative Negative mg/dL    Specific Gravity, UA 1.025 1.005 - 1.030    Blood, Urine Negative Negative    pH, UA 5.5 5.0 - 8.0    Protein, UA Negative Negative mg/dL    Urobilinogen, Urine 0.2 <2.0 E.U./dL    Nitrite, Urine Negative Negative    Leukocyte Esterase, Urine SMALL (A) Negative    Microscopic Examination YES     Urine Type NotGiven     Urine Reflex to Culture Yes    COVID-19   Result Value Ref Range    SARS-CoV-2 Not Detected Not detected   Glucose   Result Value Ref Range    Glucose 695 (HH) 70 - 99 mg/dL   Lactic Acid, Plasma   Result Value Ref Range    Lactic Acid 4.1 (HH) 0.4 - 2.0 mmol/L   Basic Metabolic Panel   Result Value Ref Range    Sodium 123 (L) 136 - 145 mmol/L    Potassium 4.6 3.5 - 5.1 mmol/L    Chloride 88 (L) 99 - 110 mmol/L    CO2 22 21 - 32 mmol/L    Anion Gap 13 3 - 16    Glucose 733 (HH) 70 - 99 mg/dL     (HH) 7 - 20 mg/dL    CREATININE 1.6 (H) 0.6 - 1.2 mg/dL    GFR Non- 31 (A) >60    GFR  37 (A) >60    Calcium 8.6 8.3 - 10.6 mg/dL   Magnesium   Result Value Ref Range    Magnesium 2.00 1.80 - 2.40 mg/dL   Phosphorus   Result Value Ref Range    Phosphorus 3.9 2.5 - 4.9 mg/dL   Microscopic Urinalysis   Result Value Ref Range    WBC, UA 21-50 (A) 0 - 5 /HPF    RBC, UA 0-2 0 - 4 /HPF    Renal Epithelial, UA 0-1 0 - 1 /HPF    Bacteria, UA 4+ (A) None Seen /HPF   Troponin   Result Value Ref Range    Troponin 0.18 (H) <0.01 ng/mL   PT/INR qAM   Result Value 145 mmol/L    Potassium 5.0 3.5 - 5.1 mmol/L    Chloride 93 (L) 99 - 110 mmol/L    CO2 19 (L) 21 - 32 mmol/L    Anion Gap 9 3 - 16    Glucose 554 (H) 70 - 99 mg/dL     (HH) 7 - 20 mg/dL    CREATININE 1.5 (H) 0.6 - 1.2 mg/dL    GFR Non- 33 (A) >60    GFR  40 (A) >60    Calcium 8.7 8.3 - 10.6 mg/dL   Basic Metabolic Panel   Result Value Ref Range    Sodium 127 (L) 136 - 145 mmol/L    Potassium 5.0 3.5 - 5.1 mmol/L    Chloride 94 (L) 99 - 110 mmol/L    CO2 23 21 - 32 mmol/L    Anion Gap 10 3 - 16    Glucose 321 (H) 70 - 99 mg/dL     (HH) 7 - 20 mg/dL    CREATININE 1.5 (H) 0.6 - 1.2 mg/dL    GFR Non- 33 (A) >60    GFR  40 (A) >60    Calcium 8.6 8.3 - 10.6 mg/dL   Basic Metabolic Panel   Result Value Ref Range    Sodium 126 (L) 136 - 145 mmol/L    Potassium 4.9 3.5 - 5.1 mmol/L    Chloride 96 (L) 99 - 110 mmol/L    CO2 23 21 - 32 mmol/L    Anion Gap 7 3 - 16    Glucose 181 (H) 70 - 99 mg/dL     (HH) 7 - 20 mg/dL    CREATININE 1.4 (H) 0.6 - 1.2 mg/dL    GFR Non- 36 (A) >60    GFR  43 (A) >60    Calcium 9.1 8.3 - 10.6 mg/dL   Lactic Acid, Plasma   Result Value Ref Range    Lactic Acid 3.0 (H) 0.4 - 2.0 mmol/L   Lactic Acid, Plasma   Result Value Ref Range    Lactic Acid 2.6 (H) 0.4 - 2.0 mmol/L   Phosphorus   Result Value Ref Range    Phosphorus 3.0 2.5 - 4.9 mg/dL   Magnesium   Result Value Ref Range    Magnesium 1.90 1.80 - 2.40 mg/dL   Basic Metabolic Panel   Result Value Ref Range    Sodium 128 (L) 136 - 145 mmol/L    Potassium 5.1 3.5 - 5.1 mmol/L    Chloride 95 (L) 99 - 110 mmol/L    CO2 24 21 - 32 mmol/L    Anion Gap 9 3 - 16    Glucose 142 (H) 70 - 99 mg/dL     (HH) 7 - 20 mg/dL    CREATININE 1.4 (H) 0.6 - 1.2 mg/dL    GFR Non- 36 (A) >60    GFR  43 (A) >60    Calcium 9.0 8.3 - 10.6 mg/dL   Phosphorus   Result Value Ref Range    Phosphorus 3.0 2.5 - 4.9 mg/dL Magnesium   Result Value Ref Range    Magnesium 1.90 1.80 - 2.40 mg/dL   Basic Metabolic Panel   Result Value Ref Range    Sodium 127 (L) 136 - 145 mmol/L    Potassium 5.5 (H) 3.5 - 5.1 mmol/L    Chloride 94 (L) 99 - 110 mmol/L    CO2 23 21 - 32 mmol/L    Anion Gap 10 3 - 16    Glucose 139 (H) 70 - 99 mg/dL     (HH) 7 - 20 mg/dL    CREATININE 1.3 (H) 0.6 - 1.2 mg/dL    GFR Non-African American 39 (A) >60    GFR  47 (A) >60    Calcium 9.0 8.3 - 10.6 mg/dL   Troponin   Result Value Ref Range    Troponin 0.20 (H) <0.01 ng/mL   PT/INR qAM   Result Value Ref Range    Protime 14.4 (H) 9.9 - 12.7 sec    INR 1.26 (H) 0.88 - 1.12   CBC w/ Diff qam   Result Value Ref Range    WBC 16.5 (H) 4.0 - 11.0 K/uL    RBC 3.80 (L) 4.00 - 5.20 M/uL    Hemoglobin 10.2 (L) 12.0 - 16.0 g/dL    Hematocrit 31.2 (L) 36.0 - 48.0 %    MCV 82.0 80.0 - 100.0 fL    MCH 26.9 26.0 - 34.0 pg    MCHC 32.8 31.0 - 36.0 g/dL    RDW 18.5 (H) 12.4 - 15.4 %    Platelets 455 391 - 192 K/uL    MPV 9.1 5.0 - 10.5 fL    Neutrophils % 87.9 %    Lymphocytes % 7.0 %    Monocytes % 4.2 %    Eosinophils % 0.6 %    Basophils % 0.3 %    Neutrophils Absolute 14.5 (H) 1.7 - 7.7 K/uL    Lymphocytes Absolute 1.2 1.0 - 5.1 K/uL    Monocytes Absolute 0.7 0.0 - 1.3 K/uL    Eosinophils Absolute 0.1 0.0 - 0.6 K/uL    Basophils Absolute 0.0 0.0 - 0.2 K/uL   CMP qAM   Result Value Ref Range    Sodium 128 (L) 136 - 145 mmol/L    Potassium 5.9 (H) 3.5 - 5.1 mmol/L    Chloride 97 (L) 99 - 110 mmol/L    CO2 23 21 - 32 mmol/L    Anion Gap 8 3 - 16    Glucose 133 (H) 70 - 99 mg/dL     (HH) 7 - 20 mg/dL    CREATININE 1.2 0.6 - 1.2 mg/dL    GFR Non- 43 (A) >60    GFR  52 (A) >60    Calcium 9.1 8.3 - 10.6 mg/dL    Total Protein 5.5 (L) 6.4 - 8.2 g/dL    Albumin 2.1 (L) 3.4 - 5.0 g/dL    Albumin/Globulin Ratio 0.6 (L) 1.1 - 2.2    Total Bilirubin 0.8 0.0 - 1.0 mg/dL    Alkaline Phosphatase 70 40 - 129 U/L     (H) 10 - 40 U/L     (H) 15 - 37 U/L    Globulin 3.4 Not Established g/dL   Phosphorus   Result Value Ref Range    Phosphorus 3.6 2.5 - 4.9 mg/dL   Magnesium   Result Value Ref Range    Magnesium 1.90 1.80 - 2.40 mg/dL   POCT Glucose   Result Value Ref Range    POC Glucose >600 (AA) 70 - 99 mg/dl    Performed on ACCU-CHEK    POCT Glucose   Result Value Ref Range    POC Glucose >600 (AA) 70 - 99 mg/dl    Performed on ACCU-CHEK    POCT Glucose   Result Value Ref Range    POC Glucose 582 (H) 70 - 99 mg/dl    Performed on ACCU-CHEK    POCT Glucose   Result Value Ref Range    POC Glucose 573 (H) 70 - 99 mg/dl    Performed on ACCU-CHEK    POCT Glucose   Result Value Ref Range    POC Glucose 505 (H) 70 - 99 mg/dl    Performed on ACCU-CHEK    POCT Glucose   Result Value Ref Range    POC Glucose 490 (H) 70 - 99 mg/dl    Performed on ACCU-CHEK    POCT Glucose   Result Value Ref Range    POC Glucose 362 (H) 70 - 99 mg/dl    Performed on ACCU-CHEK    POCT Glucose   Result Value Ref Range    POC Glucose 313 (H) 70 - 99 mg/dl    Performed on ACCU-CHEK    POCT Glucose   Result Value Ref Range    POC Glucose 276 (H) 70 - 99 mg/dl    Performed on ACCU-CHEK    POCT Glucose   Result Value Ref Range    POC Glucose 196 (H) 70 - 99 mg/dl    Performed on ACCU-CHEK    POCT Glucose   Result Value Ref Range    POC Glucose 203 (H) 70 - 99 mg/dl    Performed on ACCU-CHEK    POCT Glucose   Result Value Ref Range    POC Glucose 159 (H) 70 - 99 mg/dl    Performed on ACCU-CHEK    POCT Glucose   Result Value Ref Range    POC Glucose 153 (H) 70 - 99 mg/dl    Performed on ACCU-CHEK    POCT Glucose   Result Value Ref Range    POC Glucose 136 (H) 70 - 99 mg/dl    Performed on ACCU-CHEK    POCT Glucose   Result Value Ref Range    POC Glucose 110 (H) 70 - 99 mg/dl    Performed on ACCU-CHEK    POCT Glucose   Result Value Ref Range    POC Glucose 174 (H) 70 - 99 mg/dl    Performed on ACCU-CHEK    POCT Glucose   Result Value Ref Range    POC Glucose 183 (H) 70 - 99 mg/dl    Performed on ACCU-CHEK    POCT Glucose   Result Value Ref Range    POC Glucose 126 (H) 70 - 99 mg/dl    Performed on ACCU-CHEK    POCT Glucose   Result Value Ref Range    POC Glucose 126 (H) 70 - 99 mg/dl    Performed on ACCU-CHEK    EKG 12 Lead   Result Value Ref Range    Ventricular Rate 94 BPM    Atrial Rate 78 BPM    QRS Duration 122 ms    Q-T Interval 362 ms    QTc Calculation (Bazett) 452 ms    R Axis 54 degrees    T Axis 261 degrees    Diagnosis       Atrial fibrillationNon-specific intra-ventricular conduction delayST abnormality lateral leads consider ischemiaAbnormal ECGNo previous ECGs availableConfirmed by MAMTA BUNN MD ([id number]) on 10/11/2021 5:19:16 PM   EKG 12 Lead   Result Value Ref Range    Ventricular Rate 100 BPM    Atrial Rate 93 BPM    QRS Duration 114 ms    Q-T Interval 360 ms    QTc Calculation (Bazett) 464 ms    R Axis 71 degrees    T Axis -87 degrees    Diagnosis       Atrial fibrillationCannot rule out Inferior infarct , age undeterminedST abnormality inferolateral leads consider ischemiaNon-specific intra-ventricular conduction delayAbnormal ECGWhen compared with ECG of 11-OCT-2021 15:20,Inverted T waves have replaced nonspecific T wave abnormality in lateral leadsConfirmed by MAMTA Gonzalez MD ([id number]) on 10/12/2021 7:09:28 AM     I spoke with Rosalee Crisostomo and ZEINA [facility]. We thoroughly discussed the history, physical exam, laboratory and imaging studies, as well as, emergency department course. Based upon that discussion, we've decided to admit Phuong Garvey to the hospital for further observation, evaluation and treatment. Final Impression  1. NSTEMI (non-ST elevated myocardial infarction) ([facility]    2. Multifocal pneumonia    3. Hyperosmolar hyperglycemic state (HHS) ([facility]    4. Acute respiratory failure with hypoxia (HCC)    5. Hyperkalemia    6.  Sepsis, due to unspecified organism, unspecified whether acute organ dysfunction present ([facility] Blood pressure 121/81, pulse 109, temperature 98 °F (36.7 °C), temperature source Oral, resp. rate 16, height 5' 4\" (1.626 m), weight 115 lb 8.3 oz (52.4 kg), SpO2 91 %. DISPOSITION  Patient was admitted to the hospital in fair condition.           Haroldo Miguel, [id number]  10/13/21 1030

## 2021-10-11 NOTE — H&P
Hospital Medicine History & Physical      Patient: Kyra Vasquez  :   1935  MRN:   [id number]  Date of Service: 10/11/21    Chief Complaint   Patient presents with    Shortness of Breath     pt arrived from [facility] with c/o SOB; normally on rm air- pt was placed on [email or web address] at [facility]; recent xray-suspected pneumonia       HISTORY OF PRESENT ILLNESS:    Kyra Vasquez is a 81 y.o. female. She was sent to the ER from [facility] for reported dyspnea. To me she relates she actually just feels \"worn out. \"  Her appetite has been poor for at least a month. She has been eating and drinking but not much. She denies rigors, sweats, nausea, and vomiting. Also denies any choking or gagging episodes while eating. She is very weak. She has been participating in rehab to the extent possible, but still not even able to stand. She has developed a left knee contracture. She has been bed or chair bound. She denies respiratory symptoms now. She states she had a 2-week cough for a while in early September but it resolved weeks ago. She denies abdominal pain and  complaints. Her recent history has been complex. She was hospitalized - for left knee total arthroplasty. A large 20cm pelvic mass was discovered during preoperative evaluation. Evaluation of the mass was delayed in favor of completing the arthroplasty first.  She was discharged to a SNF. Rehab did not progress well. She developed a left knee contracture. She then presented 21 because her velazquez catheter was thought to have become dislodged. It was suspected at the time that the mass effect of the tumor was causing urinary retention. A new velazquez was placed. She was hospitalized  because at the time her  was unable to take care of her at home and unable to get her to needed appointments. While hospitalized she was evaluated by Ob/Gyn.   Unfortunately no Gyn/Onc consultant was available so she was transferred to [facility] to be evaluated by Dr. Fiona Dunlap. She underwent SUKHI/BSO 8/20/21 at [facility].  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 Review of Systems:  All pertinent positives and negatives are as noted in the HPI section. All other systems were reviewed and are negative. Past Medical History:   Diagnosis Date    Arthritis     CAD (coronary artery disease)     MI    Carotid stenosis     History of blood transfusion     s/p Rt TKR    Hyperlipidemia     Hypertension     Impaired mobility     recent falls - uses W/C    Kidney disease     stage 3 kidney disease    Mitral valve disease     and Aortic    OA (osteoarthritis)     Peripheral vascular disease (HCC)     Thyroid disease     Urinary frequency        Past Surgical History:   Procedure Laterality Date    COLONOSCOPY  2/2012    CORONARY ANGIOPLASTY WITH STENT PLACEMENT      2009    ELBOW SURGERY      left    JOINT REPLACEMENT  8/30/11     right total knee replacement    TONSILLECTOMY      TOTAL KNEE ARTHROPLASTY Left 7/12/2021    LEFT TOTAL KNEE ARTHROPLASTY WITH ADDUCTOR CANAL BLOCK FOR PAIN CONTROL              MEDACTA performed by Jarret Lindsay MD at [facility] 1         Prior to Admission medications    Medication Sig Start Date End Date Taking? Authorizing Provider   acetaminophen (TYLENOL) 325 MG tablet Take 650 mg by mouth every 6 hours as needed for Pain   Yes Historical Provider, MD   polyethylene glycol (MIRALAX) 17 g PACK packet Take 17 g by mouth daily   Yes Historical Provider, MD   nitroGLYCERIN (NITROSTAT) 0.4 MG SL tablet Place 0.4 mg under the tongue every 5 minutes as needed for Chest pain up to max of 3 total doses. If no relief after 1 dose, call 911. Yes Historical Provider, MD   oxyCODONE (ROXICODONE) 5 MG immediate release tablet Take 5 mg by mouth every 6 hours as needed for Pain.    Yes Historical Provider, MD   aspirin 81 MG EC tablet Take 81 mg by mouth daily   Yes Historical Provider, MD lisinopril-hydroCHLOROthiazide (PRINZIDE;ZESTORETIC) 20-12.5 MG per tablet Take 1 tablet by mouth daily 5/6/21  Yes Historical Provider, MD   sennosides-docusate sodium (SENOKOT-S) 8.6-50 MG tablet Take 1 tablet by mouth 2 times daily  Patient taking differently: Take 1 tablet by mouth daily  7/14/21  Yes LINDSAY Asif CNP   levothyroxine (SYNTHROID) 50 MCG tablet Take 50 mcg by mouth Daily  2/27/19  Yes Historical Provider, MD   therapeutic multivitamin-minerals (THERAGRAN-M) tablet Take 1 tablet by mouth daily. Yes Historical Provider, MD   fenofibrate (TRIGLIDE) 160 MG tablet Take 160 mg by mouth daily. Yes Historical Provider, MD   amlodipine (NORVASC) 5 MG tablet Take 5 mg by mouth 2 times daily. Yes Historical Provider, MD   metoprolol (LOPRESSOR) 25 MG tablet Take 25 mg by mouth 2 times daily. Yes Historical Provider, MD   atorvastatin (LIPITOR) 40 MG tablet Take 40 mg by mouth nightly. Yes Historical Provider, MD   Cholecalciferol (VITAMIN D3) 2000 UNIT CAPS Take 2,000 Units by mouth daily. Yes Historical Provider, MD       Allergies:   Patient has no known allergies. Social:   reports that she has quit smoking. She has never used smokeless tobacco.   reports current alcohol use. Social History     Substance and Sexual Activity   Drug Use No       Family History   Problem Relation Age of Onset    Heart Disease Mother     Cancer Mother         ovarian    Cancer Father         lulu       PHYSICAL EXAM:  I performed this physical examination.     Vitals:  Patient Vitals for the past 24 hrs:   BP Temp Pulse Resp SpO2   10/11/21 1836   87 25 96 %   10/11/21 1814 116/63  96 (!) 31 96 %   10/11/21 1714 138/62  104 30 91 %   10/11/21 1614 105/60  96 25 91 %   10/11/21 1437 115/67 97.8 °F (36.6 °C) 98 15 97 %       Intake/Output Summary (Last 24 hours) at 10/11/2021 1914  Last data filed at 10/11/2021 1804  Gross per 24 hour   Intake 904.45 ml   Output    Net 904.45 ml     2L/min O2    GEN:  Appearance:  Elderly female in NAD . Level of Consciousness:  alert . Orientation:  full    HEENT: Sclera anicteric.  no conjunctival chemosis. dry mucus membranes. no specific or diagnostic oral lesions. NECK:  no signs of meningismus. Jugular veins not distended. Carotid pulses  2+.  no cervical lymphadenopathy. no thyromegaly. CV:  regular rhythm. normal S1 & S2.    3/6 blow systolic murmur at LLSB, apex, left axilla. no rub.  no gallop. PULM:  Chest excursion is symmetric. Breath sounds are vesicular. Adventitious sounds:  none    AB:  Abdominal shape is normal.  Bowel sounds are active. Generally soft to palpation. no tenderness is present. no involuntary guarding. no rebound guarding. incision    RECTAL:   :  Presacral ulcer with dressing in place. Unable to examine fully without help at this time. EXTR:  Skin is warm. Capillary refill brisk. no specific or pathognomic rash. no clubbing. no pitting edema. no active wound or ulcer. Left knee contracture      LABS:  Lab Results   Component Value Date    WBC 14.4 (H) 10/11/2021    HGB 9.2 (L) 10/11/2021    HCT 28.3 (L) 10/11/2021    MCV 84.4 10/11/2021     10/11/2021     Lab Results   Component Value Date    CREATININE 1.8 (H) 10/11/2021     (HH) 10/11/2021     (L) 10/11/2021    K 5.5 (H) 10/11/2021    CL 90 (L) 10/11/2021    CO2 25 10/11/2021     Lab Results   Component Value Date     (H) 10/11/2021    AST 89 (H) 10/11/2021    ALKPHOS 94 10/11/2021    BILITOT 1.0 10/11/2021     Lab Results   Component Value Date    TROPONINI 0.16 (H) 10/11/2021     No results for input(s): PHART, TGW1JTP, PO2ART in the last 72 hours. IMAGING:  XR CHEST PORTABLE    Result Date: 10/11/2021  EXAMINATION: ONE XRAY VIEW OF THE CHEST 10/11/2021 3:41 pm COMPARISON: Prior study(s) most recent 09/02/2011.  HISTORY: ORDERING SYSTEM PROVIDED HISTORY: sob TECHNOLOGIST PROVIDED HISTORY: Reason for exam:->sob Reason for Exam: SOB Acuity: Acute Type of Exam: Initial FINDINGS: The heart is upper normal.  Margins are poorly defined due to moderate rotation to the left. The posts are congested. There is bilateral airspace disease asymmetric greater in the right lower lobe. Involvement left lower lobe is also present obscuring the left diaphragm. The left upper lung is relatively clear. Moderate bilateral airspace disease primarily in the lower lobes. Primary concern is multifocal pneumonia, which includes COVID pneumonia. RECOMMENDATION: Follow-up is recommended preferably with upright PA and lateral views when feasible. I directly reviewed all recent imaging studies as well as pertinent prior studies. Radiology reports may or may not be available at the time of my review. Portable CXR - Small right pleural effusion. EKG:  New and pertinent prior tracings were directly reviewed. My interpretation is as follows:  Irregular narrow complex rhythm at 90-100bpm on two tracings. Regular low amplitude p-waves at ~ 250 bpm visible in V1 and V2. Delfina Bloodgood Likely Afib w/ controlled HR, AFL w/ variable AV conduction. Other rhythms such as sinus w/ PACs, WAP, MAT, and atrial tachycardia cannot be excluded. Active Hospital Problems    Diagnosis Date Noted    Type 2 diabetes mellitus with hyperosmolarity (HCC) [E11.00] 10/11/2021    [facility] [N17.9] 10/11/2021    New onset atrial fibrillation (HCC) [I48.91] 10/11/2021    Sepsis [facility] [A41.9] 10/11/2021    Stage 3a chronic kidney disease (HCC) [N18.31] 10/11/2021    Severe mitral regurgitation [I34.0] 10/11/2021    CAD (coronary artery disease) [I25.10]        ASSESSMENT & PLAN  Sepsis  -  Candidate sources:  Lower respiratory, , Skin/soft tissue (sacral ulcer). -  Cultures:  Blood. U/A w/ reflex also requested. -  Empiric Abx: cefepime and linezolid so far. De-escalate pending cultures.   -  Resus:  So far has received 1L NS in the ER. Clearly hypovolemic. Another 2L LR ordered to be given over 4 hours. Will hold if she becomes dyspneic or has an escalating O2 requirement. Start pressors prn hypotension. DM2 w/ HHS  -  No prior diagnosis of diabetes. -  Start insulin infusion titrated per DKA/HHS nomogram.  -  Scheduled chemistry checks and electrolyte repletion per hospital protool. -  Crystalloid as above plus D5/LR when BGL < 250 mg/dL. MARGY on CKD3a  -  Baseline creatinine ~ 1 and currently 1.8 mg/dL. -  Avoid nephrotoxin exposure where possible. Hold home lisinopril, HCTZ. -  Crystalloid as above. CAD s/p PCI, Arrhythmia, Elevated troponin, severe MR, moderate AS  -  CAD with two-vessel PCI in 2008   -  LVEF 57% per MPI 7/8/2021. No fixed or reversible perfusion defects.  -  TTE 5/16/2019 w/ severe MR and moderate AS. -  Plateau pattern troponin elevation thus far c/w type 2 MI, likely r/t sepsis and arrythmia. Trend out troponin overnight.  -  Continue home ASA, statin, metoprolol. Hold lisinopril, HCTZ, and amlodipine (MARGY and soft BP's). -  Hopefully HR will decrease w/ additional IV fluids. Will check another EKG if HR decreases in hopes of clarifying the rhythm. She is not requiring any additional treatment for rate control.  -  CHADS2-Vasc = 5. Anticoagulation with warfarin is indicated (valvular atrial fibrillation). Will start lovenox 40mg s.c. q12h. Updated TTE tomorrow. HypoThyroid  -  Continue home synthroid 50 mcg daily. Anemia  -  Chronic normocytic anemia. Stable compared to baselines. DVT prophylaxis: SCDs, lovenox 30  Code Status:  Patient voiced a desire to be DNR. She related she voiced the same desire at [facility]. She and her spouse have never had a discussion to plan for end of life. Will request palliative care team involvement to continue the discussion. I entered code status as DNR-CCA for now.   Disposition:  Inpatient for the foreseeable future. I attest that this patient's condition was sufficiently critical to warrant urgent intervention due to sepsis. A total of 70 minutes was spent in direct patient care at the bedside and entering orders. This time did not include separately billable procedures.       Edwin Lopez MD MD

## 2021-10-11 NOTE — ED NOTES
Periwick placed on patient; pharmacist states okay to give BELLE SHAH [facility] even though pt had ASA recently     Sundar Coon, RN  10/11/21 5929

## 2021-10-11 NOTE — ED PROVIDER NOTES
124 (L) 136 - 145 mmol/L    Potassium 5.5 (H) 3.5 - 5.1 mmol/L    Chloride 90 (L) 99 - 110 mmol/L    CO2 25 21 - 32 mmol/L    Anion Gap 9 3 - 16    Glucose 778 (HH) 70 - 99 mg/dL     (HH) 7 - 20 mg/dL    CREATININE 1.8 (H) 0.6 - 1.2 mg/dL    GFR Non-African American 27 (A) >60    GFR  32 (A) >60    Calcium 8.9 8.3 - 10.6 mg/dL    Total Protein 5.7 (L) 6.4 - 8.2 g/dL    Albumin 2.3 (L) 3.4 - 5.0 g/dL    Albumin/Globulin Ratio 0.7 (L) 1.1 - 2.2    Total Bilirubin 1.0 0.0 - 1.0 mg/dL    Alkaline Phosphatase 94 40 - 129 U/L     (H) 10 - 40 U/L    AST 89 (H) 15 - 37 U/L    Globulin 3.4 Not Established g/dL   Troponin   Result Value Ref Range    Troponin 0.16 (H) <0.01 ng/mL   Blood gas, venous   Result Value Ref Range    pH, Dewayne 7.489 (H) 7.350 - 7.450    pCO2, Dewayne 30.0 (L) 40.0 - 50.0 mmHg    pO2, Dewayne 115.1 (H) 25 - 40 mmHg    HCO3, Venous 22.3 (L) 23.0 - 29.0 mmol/L    Base Excess, Dewayne -0.5 -3.0 - 3.0 mmol/L    O2 Sat, Dewayne 98 Not Established %    Carboxyhemoglobin 2.4 (H) 0.0 - 1.5 %    MetHgb, Dewayne 0.6 <1.5 %    TC02 (Calc), Dewayne 23 Not Established mmol/L    O2 Therapy Unknown    Lactic acid, plasma   Result Value Ref Range    Lactic Acid 2.9 (H) 0.4 - 2.0 mmol/L   Brain Natriuretic Peptide   Result Value Ref Range    Pro-BNP 11,334 (H) 0 - 449 pg/mL   Magnesium   Result Value Ref Range    Magnesium 2.10 1.80 - 2.40 mg/dL   Procalcitonin   Result Value Ref Range    Procalcitonin 2.19 (H) 0.00 - 0.15 ng/mL   Troponin   Result Value Ref Range    Troponin 0.15 (H) <0.01 ng/mL   Glucose   Result Value Ref Range    Glucose 729 (HH) 70 - 99 mg/dL   Urinalysis Reflex to Culture    Specimen: Urine, clean catch   Result Value Ref Range    Color, UA Yellow Straw/Yellow    Clarity, UA Clear Clear    Glucose, Ur 500 (A) Negative mg/dL    Bilirubin Urine Negative Negative    Ketones, Urine Negative Negative mg/dL    Specific Gravity, UA 1.025 1.005 - 1.030    Blood, Urine Negative Negative    pH, UA 5.5 5.0 - 8.0    Protein, UA Negative Negative mg/dL    Urobilinogen, Urine 0.2 <2.0 E.U./dL    Nitrite, Urine Negative Negative    Leukocyte Esterase, Urine SMALL (A) Negative    Microscopic Examination YES     Urine Type NotGiven     Urine Reflex to Culture Yes    Glucose   Result Value Ref Range    Glucose 695 (HH) 70 - 99 mg/dL   Lactic Acid, Plasma   Result Value Ref Range    Lactic Acid 4.1 (HH) 0.4 - 2.0 mmol/L   Basic Metabolic Panel   Result Value Ref Range    Sodium 123 (L) 136 - 145 mmol/L    Potassium 4.6 3.5 - 5.1 mmol/L    Chloride 88 (L) 99 - 110 mmol/L    CO2 22 21 - 32 mmol/L    Anion Gap 13 3 - 16    Glucose 733 (HH) 70 - 99 mg/dL    CREATININE 1.6 (H) 0.6 - 1.2 mg/dL    GFR Non- 31 (A) >60    GFR  37 (A) >60    Calcium 8.6 8.3 - 10.6 mg/dL   Magnesium   Result Value Ref Range    Magnesium 2.00 1.80 - 2.40 mg/dL   Phosphorus   Result Value Ref Range    Phosphorus 3.9 2.5 - 4.9 mg/dL   Microscopic Urinalysis   Result Value Ref Range    WBC, UA 21-50 (A) 0 - 5 /HPF    RBC, UA 0-2 0 - 4 /HPF    Renal Epithelial, UA 0-1 0 - 1 /HPF    Bacteria, UA 4+ (A) None Seen /HPF   POCT Glucose   Result Value Ref Range    POC Glucose >600 (AA) 70 - 99 mg/dl    Performed on ACCU-CHEK    POCT Glucose   Result Value Ref Range    POC Glucose >600 (AA) 70 - 99 mg/dl    Performed on ACCU-CHEK    EKG 12 Lead   Result Value Ref Range    Ventricular Rate 94 BPM    Atrial Rate 78 BPM    QRS Duration 122 ms    Q-T Interval 362 ms    QTc Calculation (Bazett) 452 ms    R Axis 54 degrees    T Axis 261 degrees    Diagnosis       Atrial fibrillationNon-specific intra-ventricular conduction delayST abnormality lateral leads consider ischemiaAbnormal ECGNo previous ECGs availableConfirmed by MAMTA BUNN MD ([id number]) on 10/11/2021 5:19:16 PM   EKG 12 Lead   Result Value Ref Range    Ventricular Rate 100 BPM    Atrial Rate 93 BPM    QRS Duration 114 ms    Q-T Interval 360 ms    QTc Calculation (Bazett) 464 ms    R Axis 71 degrees    T Axis -87 degrees    Diagnosis       Atrial fibrillationCannot rule out Inferior infarct , age undeterminedST & T wave abnormality, consider lateral ischemia or digitalis effectAbnormal ECGWhen compared with ECG of 11-OCT-2021 15:20,Inverted T waves have replaced nonspecific T wave abnormality in Inferior leads         XR CHEST PORTABLE   Final Result   Moderate bilateral airspace disease primarily in the lower lobes. Primary   concern is multifocal pneumonia, which includes COVID pneumonia. RECOMMENDATION:   Follow-up is recommended preferably with upright PA and lateral views when   feasible. EKG  The Ekg interpreted by myself  atrial fibrillation with a rate of 94  Axis is   Normal  QTc is  normal  Intervals and Durations are unremarkable. No specific ST-T wave changes appreciated. T wave inversions in the anterolateral leads which appear chronic. No evidence of acute ischemia. A. fib versus sinus arrhythmia new when compared to the EKG from June 24, 2021. Otherwise no changes today. [id number]  The Ekg interpreted by myself  atrial fibrillation with a rate of 100  Axis is   Normal  QTc is  normal  Intervals and Durations are unremarkable. No specific ST-T wave changes appreciated. LVH with T wave inversions noted in leads V4 through V6 also inferior T wave inversions. No evidence of acute ischemia. No significant change from prior EKG dated 10/11/21    Here she is requiring supplemental oxygen. She is noted to be in A. fib which appears new. There are no ischemic changes on her EKG. Her lab work was grossly abnormal with an elevated lactic acid and hyperglycemia. VBG does not show acidosis. Her BNP is significantly elevated and her troponin is elevated at 0.16.  3-hour repeat troponin has gone down slightly to 0.15. She is not having chest pain. She has been given 10 units of insulin and 1 L of IV fluids in the ER.   Blood cultures have been sent. She has been given broad-spectrum antibiotics. Chest x-ray shows evidence of multifocal pneumonia. Her rapid Covid is negative. Her presentation and lab work does appear consistent status of we did not give 30 cc/kg of IV fluids due to the fact that she does likely have some fluid overload with a significantly elevated BNP. She will be admitted to the ICU. The inpatient team is starting an insulin drip. Her labs do not appear consistent with DKA but she likely does have HONC. we did speak with cardiology who recommends trending the troponins and they do not recommend heparin drip at this time.      Sandrita Vidales MD  10/12/21 1620       Sandrita Vidales MD  10/12/21 1627

## 2021-10-12 NOTE — DISCHARGE INSTR - COC
fibrillation (HCC) I48.91    Sepsis ([facility] A41.9    Stage 3a chronic kidney disease ([facility] N18.31    Severe mitral regurgitation I34.0    Elevated troponin R77.8       Isolation/Infection:   Isolation          Droplet Plus        Patient Infection Status     Infection Onset Added Last Indicated Last Indicated By Review Planned Expiration Resolved Resolved By    COVID-19 Rule Out 10/11/21 10/11/21 10/11/21 COVID-19 (Ordered) 10/18/21 10/25/21      Resolved    COVID-19 Rule Out 10/11/21 10/11/21 10/11/21 COVID-19, Rapid (Ordered)   10/11/21 Rule-Out Test Resulted          Nurse Assessment:  Last Vital Signs: BP 98/77   Pulse 89   Temp 97.4 °F (36.3 °C) (Oral)   Resp 18   Ht 5' 4\" (1.626 m)   Wt 126 lb 8.7 oz (57.4 kg)   SpO2 95%   BMI 21.72 kg/m²     Last documented pain score (0-10 scale): Pain Level: 6  Last Weight:   Wt Readings from Last 1 Encounters:   10/11/21 126 lb 8.7 oz (57.4 kg)     Mental Status:  {IP PT MENTAL STATUS:20030}    IV Access:  { PIPE IV ACCESS:647423537}    Nursing Mobility/ADLs:  Walking   {P DME BAEJ:601112138}  Transfer  {P DME LYFT:633762528}  Bathing  {P DME CCRP:136195164}  Dressing  {P DME UNXQ:912012175}  Toileting  {P DME ZSCV:424440207}  Feeding  {P DME PDAO:555355863}  Med Admin  {P DME IHGJ:438646834}  Med Delivery   { PIPE MED Delivery:153789335}    Wound Care Documentation and Therapy:  Negative Pressure Wound Therapy Knee Anterior; Left (Active)   Number of days: 91       Incision 08/31/11 Knee Right (Active)   Number of days: 7881       Wound 10/11/21 Buttocks Left stage 2 (Active)   Number of days: 0       Wound 10/11/21 Buttocks Right stage 2 (Active)   Number of days: 0        Elimination:  Continence:   · Bowel: {YES / EK:68902}  · Bladder: {YES / LILIANA:58657}  Urinary Catheter: {Urinary Catheter:889155924}   Colostomy/Ileostomy/Ileal Conduit: {YES / :84135}       Date of Last BM: ***    Intake/Output Summary (Last 24 hours) at 10/12/2021 1244  Last data filed at 10/12/2021 1040  Gross per 24 hour   Intake 5111.93 ml   Output 425 ml   Net 4686.93 ml     I/O last 3 completed shifts:   In: 5111.9 [I.V.:3903.2; IV Piggyback:1208.8]  Out: 350 [Urine:350]    Safety Concerns:     { PIPE Safety Concerns:761134682}    Impairments/Disabilities:      { PIPE Impairments/Disabilities:099732381}    Nutrition Therapy:  Current Nutrition Therapy:   [address] PIPE Diet List:971183901}    Routes of Feeding: {[facility] DME Other Feedings:461798546}  Liquids: {Slp liquid thickness:31535}  Daily Fluid Restriction: {[facility] DME Yes amt example:300609413}  Last Modified Barium Swallow with Video (Video Swallowing Test): {Done Not Done TVPM:015779556}    Treatments at the Time of Hospital Discharge:   Respiratory Treatments: ***  Oxygen Therapy:  {Therapy; copd oxygen:40305}  Ventilator:    {[facility] Vent JKFF:746487287}    Rehab Therapies: {THERAPEUTIC INTERVENTION:3448146552}  Weight Bearing Status/Restrictions: [address] CC Weight Bearin}  Other Medical Equipment (for information only, NOT a DME order):  {EQUIPMENT:059844207}  Other Treatments: ***    Patient's personal belongings (please select all that are sent with patient):  {[facility] DME Belongings:634942562}    RN SIGNATURE:  {Esignature:889266268}    CASE MANAGEMENT/SOCIAL WORK SECTION    Inpatient Status Date: ***    Readmission Risk Assessment Score:  Readmission Risk              Risk of Unplanned Readmission:  21           Discharging to Facility/ Agency   · Name:   · Address:  · Phone:  · Fax:    Dialysis Facility (if applicable)   · Name:  · Address:  · Dialysis Schedule:  · Phone:  · Fax:    / signature: {Esignature:498664633}    PHYSICIAN SECTION    Prognosis: {Prognosis:3671055308}    Condition at Discharge: [address] Patient Condition:233920849}    Rehab Potential (if transferring to Rehab): {Prognosis:5298224894}    Recommended Labs or Other Treatments After Discharge: ***  Recommended Follow-up, Labs or Other Treatments After Discharge:    ***             Physician Certification: I certify the above information and transfer of Candido Glez  is necessary for the continuing treatment of the diagnosis listed and that she requires {Admit to Appropriate Level of Care:86954} for {GREATER/LESS:595199353} 30 days.      Update Admission H&P: {CHP DME Changes in YSRLD:486056485}    PHYSICIAN SIGNATURE:  {Esignature:161359739}

## 2021-10-12 NOTE — CONSULTS
[facility] Nurse  Consult Note       NAME:  Raman Duong  MEDICAL RECORD NUMBER:  [id number]  AGE: 81 y.o. GENDER: female  : 1935  TODAY'S DATE:  10/12/2021    Subjective Pt reluctant to move in bed. Answers questions appropriately. Reason for WOCN Evaluation and Assessment: sacrum, left heel      Raman Duong is a 81 y.o. female referred by:   [x] Physician  [x] Nursing  [] Other:     Wound Identification:  Wound Type: pressure  Contributing Factors: chronic pressure and decreased mobility    Pt seen for pressure injuries to the sacrum and left heel. Present on admission. Pt has had recent left knee arthroplasty and SUKHI/BSO with hospitalizations. She was admitted from SNF where she was receiving PT. Reports of not eating or drinking well, she is bed bound/chair bound.       Patient Goal of Care:  [] Wound Healing  [] Odor Control  [] Palliative Care  [] Pain Control   [] Other:         PAST MEDICAL HISTORY        Diagnosis Date    Arthritis     CAD (coronary artery disease)     MI    Carotid stenosis     History of blood transfusion     s/p Rt TKR    Hyperlipidemia     Hypertension     Impaired mobility     recent falls - uses W/C    Kidney disease     stage 3 kidney disease    Mitral valve disease     and Aortic    OA (osteoarthritis)     Peripheral vascular disease ([facility] [address]     Thyroid disease     Urinary frequency        PAST SURGICAL HISTORY    Past Surgical History:   Procedure Laterality Date    COLONOSCOPY  2012    CORONARY ANGIOPLASTY WITH STENT PLACEMENT          ELBOW SURGERY      left    JOINT REPLACEMENT  11     right total knee replacement    TONSILLECTOMY      TOTAL KNEE ARTHROPLASTY Left 2021    LEFT TOTAL KNEE ARTHROPLASTY WITH ADDUCTOR CANAL BLOCK FOR PAIN CONTROL              MEDACTA performed by Emigdio Kirby MD at [address] [place] [address] HISTORY    Family History   Problem Relation Age of Onset    Heart Disease Mother    Ammon Pyle Cancer Mother         ovarian    Cancer Father         lulu       SOCIAL HISTORY    Social History     Tobacco Use    Smoking status: Former Smoker    Smokeless tobacco: Never Used    Tobacco comment: smoked in her 25s    Vaping Use    Vaping Use: Never used   Substance Use Topics    Alcohol use: Yes     Comment: occasional    Drug use: No       ALLERGIES    No Known Allergies    MEDICATIONS    No current facility-administered medications on file prior to encounter. Current Outpatient Medications on File Prior to Encounter   Medication Sig Dispense Refill    acetaminophen (TYLENOL) 325 MG tablet Take 650 mg by mouth every 6 hours as needed for Pain      polyethylene glycol (MIRALAX) 17 g PACK packet Take 17 g by mouth daily      nitroGLYCERIN (NITROSTAT) 0.4 MG SL tablet Place 0.4 mg under the tongue every 5 minutes as needed for Chest pain up to max of 3 total doses. If no relief after 1 dose, call 911.  oxyCODONE (ROXICODONE) 5 MG immediate release tablet Take 5 mg by mouth every 6 hours as needed for Pain.  aspirin 81 MG EC tablet Take 81 mg by mouth daily      lisinopril-hydroCHLOROthiazide (PRINZIDE;ZESTORETIC) 20-12.5 MG per tablet Take 1 tablet by mouth daily      sennosides-docusate sodium (SENOKOT-S) 8.6-50 MG tablet Take 1 tablet by mouth 2 times daily (Patient taking differently: Take 1 tablet by mouth daily )      levothyroxine (SYNTHROID) 50 MCG tablet Take 50 mcg by mouth Daily   1    therapeutic multivitamin-minerals (THERAGRAN-M) tablet Take 1 tablet by mouth daily.  fenofibrate (TRIGLIDE) 160 MG tablet Take 160 mg by mouth daily.  amlodipine (NORVASC) 5 MG tablet Take 5 mg by mouth 2 times daily.  metoprolol (LOPRESSOR) 25 MG tablet Take 25 mg by mouth 2 times daily.  atorvastatin (LIPITOR) 40 MG tablet Take 40 mg by mouth nightly.  Cholecalciferol (VITAMIN D3) 2000 UNIT CAPS Take 2,000 Units by mouth daily.            Objective    BP 98/77   Pulse 89   Temp 97.4 °F (36.3 °C) (Oral)   Resp 18   Ht 5' 4\" (1.626 m)   Wt 126 lb 8.7 oz (57.4 kg)   SpO2 95%   BMI 21.72 kg/m²     LABS:  WBC:    Lab Results   Component Value Date    WBC 18.0 10/12/2021     H/H:    Lab Results   Component Value Date    HGB 8.8 10/12/2021    HCT 26.6 10/12/2021     PTT:    Lab Results   Component Value Date    APTT 31.9 06/24/2021   [APTT}  PT/INR:    Lab Results   Component Value Date    PROTIME 14.8 10/12/2021    INR 1.30 10/12/2021     HgBA1c:    Lab Results   Component Value Date    LABA1C 5.8 06/24/2021       Assessment   Fabian Risk Score:      Patient Active Problem List   Diagnosis Code    Right TKR Z96.659    Prepatellar bursitis of right knee M70.41    Localized osteoarthritis of left knee M17.12    Primary localized osteoarthritis of left knee M17.12    Status post total knee replacement, left Z96.652    CAD (coronary artery disease) I25.10    Hyperlipidemia E78.5    Hypertension I10    CKD (chronic kidney disease) N18.9    Pelvic mass R19.00    Generalized abdominal mass R19.07    Dislodged Lerma catheter ([address] T83.021A    Type 2 diabetes mellitus with hyperosmolarity (HCC) E11.00    MARGY (acute kidney injury) ([address] N17.9    HCAP (healthcare-associated pneumonia) J18.9    New onset atrial fibrillation (HCC) I48.91    Sepsis (HCC) A41.9    Stage 3a chronic kidney disease (HCC) N18.31    Severe mitral regurgitation I34.0    Elevated troponin R77.8       Measurements:  Negative Pressure Wound Therapy Knee Anterior; Left (Active)   Number of days: 91       Incision 08/31/11 Knee Right (Active)   Number of days: 8687       Wound 10/11/21 Buttocks Left stage 2 (Active)   Number of days: 0       Wound 10/11/21 Buttocks Right stage 2 (Active)   Number of days: 0     Incision 07/12/21 Knee Left (Active)   Number of days: 91   Left buttock:  purple, nonblanchable discoloration. Deep tissue injury, present on admission.   Sacrum:  Unstageable pressure injury, (evolving DTI) present on admission. 40% nonblanchable purple discoloration, 60% yellow and black nonviable tissue. No soi. left IT:  2.2x1cm, 100% dark purple, nonblanchable discoloration. Deep tissue injury, present on admission    Left lateral malleolus:  Pink, blanchable discoloration    Left heel:  2.5x3.1cm, 100% purple, nonblanchable discoloration. Deep tissue injury, present on admission. Response to treatment:  Well tolerated by patient. Pain Assessment:  Severity:  0 / 10  Quality of pain: N/A  Wound Pain Timing/Severity: none  Premedicated: N/A    Plan    Sacrum, Left IT, Left buttock:  Venelex ointment 2-3 times daily and prn. Amanda Red Heel foam dressings bilaterally.   Float heels on pillows or utilize heel lift boots while in bed         Specialty Bed Required : Yes on ICU surface  [x] Low Air Loss   [] Pressure Redistribution  [] Fluid Immersion  [] Bariatric   [] Total Pressure Relief  [] Other:     Current Diet: Diet NPO Exceptions are: Ice Chips, Sips of Water with Meds, Sips of Clear Liquids  Dietician consult:  Yes    Discharge Plan:  Placement for patient upon discharge: skilled nursing    Patient appropriate for Outpatient [address]: Yes    Referrals:  [x]   [] [facility]  [] Supplies  [] Other    Patient/Caregiver Teaching:  Level of patient/caregiver understanding able to:   [] Indicates understanding       [x] Needs reinforcement  [] Unsuccessful      [x] Verbal Understanding  [] Demonstrated understanding       [] No evidence of learning  [] Refused teaching         [] N/A       Electronically signed by Lydia Stark RN, CWOCN on 10/12/2021 at 1:01 PM

## 2021-10-12 NOTE — PROGRESS NOTES
Hospitalist Progress Note      PCP: Penelope More MD    Date of Admission: 10/11/2021    Chief Complaint: SOB    Subjective: no new c/o. Medications:  Reviewed    Infusion Medications    insulin 3.96 Units/hr (10/12/21 0856)    dextrose 5% in lactated ringers 150 mL/hr at 10/12/21 0626     Scheduled Medications    mupirocin   Nasal BID    cefepime  2,000 mg IntraVENous Q12H    aspirin  81 mg Oral Daily    atorvastatin  40 mg Oral Nightly    levothyroxine  50 mcg Oral Daily    metoprolol tartrate  25 mg Oral BID    sennosides-docusate sodium  1 tablet Oral Daily    polyethylene glycol  17 g Oral Daily    therapeutic multivitamin-minerals  1 tablet Oral Daily    enoxaparin  40 mg SubCUTAneous BID     PRN Meds: dextrose, potassium chloride, magnesium sulfate, sodium phosphate IVPB **OR** sodium phosphate IVPB **OR** sodium phosphate IVPB, dextrose 5% in lactated ringers, ondansetron, acetaminophen **OR** acetaminophen, perflutren lipid microspheres      Intake/Output Summary (Last 24 hours) at 10/12/2021 0913  Last data filed at 10/12/2021 0630  Gross per 24 hour   Intake 5111.93 ml   Output 350 ml   Net 4761.93 ml       Physical Exam Performed:    BP (!) 99/58   Pulse 97   Temp 97.4 °F (36.3 °C) (Oral)   Resp 27   Ht 5' 4\" (1.626 m)   Wt 126 lb 8.7 oz (57.4 kg)   SpO2 98%   BMI 21.72 kg/m²     General appearance: No apparent distress, appears stated age and cooperative. HEENT: Pupils equal, round, and reactive to light. Conjunctivae/corneas clear. Neck: Supple, with full range of motion. No jugular venous distention. Trachea midline. Respiratory:  Normal respiratory effort. Clear to auscultation, bilaterally without Rales/Wheezes/Rhonchi. Cardiovascular: Regular rate and rhythm with normal S1/S2 without murmurs, rubs or gallops. Abdomen: Soft, non-tender, non-distended with normal bowel sounds. Musculoskeletal: No clubbing, cyanosis or edema bilaterally.   Full range of motion without deformity. Skin: Skin color, texture, turgor normal.  No rashes or lesions. Neurologic:  Neurovascularly intact without any focal sensory/motor deficits. Cranial nerves: II-XII intact, grossly non-focal.  Psychiatric: Alert and oriented, thought content appropriate, normal insight  Capillary Refill: Brisk,< 3 seconds   Peripheral Pulses: +2 palpable, equal bilaterally       Labs:   Recent Labs     10/11/21  1458 10/12/21  0418   WBC 14.4* 18.0*   HGB 9.2* 8.8*   HCT 28.3* 26.6*    228     Recent Labs     10/11/21  2346 10/12/21  0418 10/12/21  0814   * 127*  127* 126*   K 5.0 5.1  5.0 4.9   CL 93* 95*  94* 96*   CO2 19* 23  23 23   * 119*  121* 118*   CREATININE 1.5* 1.5*  1.5* 1.4*   CALCIUM 8.7 8.8  8.6 9.1   PHOS 3.6 3.1 2.9     Recent Labs     10/11/21  1458 10/12/21  0418   AST 89* 96*   * 130*   BILITOT 1.0 0.7   ALKPHOS 94 80     Recent Labs     10/12/21  0418   INR 1.30*     Recent Labs     10/11/21  1458 10/11/21  1836 10/12/21  0814   TROPONINI 0.16* 0.15* 0.18*       Urinalysis:      Lab Results   Component Value Date    NITRU Negative 10/11/2021    WBCUA 21-50 10/11/2021    BACTERIA 4+ 10/11/2021    RBCUA 0-2 10/11/2021    BLOODU Negative 10/11/2021    SPECGRAV 1.025 10/11/2021    GLUCOSEU 500 10/11/2021    GLUCOSEU NEGATIVE 08/18/2011       Consults:    IP CONSULT TO HOSPITALIST  IP CONSULT TO PALLIATIVE CARE  IP CONSULT TO CARDIOLOGY      Assessment/Plan:    Active Hospital Problems    Diagnosis     Type 2 diabetes mellitus with hyperosmolarity (HCC) [E11.00]     MARGY (acute kidney injury) ([facility] [address] [N17.9]     New onset atrial fibrillation ([facility] [address] [I48.91]     Sepsis ([address] [A41.9]     Stage 3a chronic [facility] disease ([facility] [N18.31]     Severe mitral regurgitation [I34.0]     CAD (coronary artery disease) [I25.10]        Sepsis   -  of unclear sourse w/ Bld Cx demonstrating E Faecalis. Infectious Disease cnsulted and apprecaited in advance.    - Water with Meds, Sips of Clear Liquids  Code Status: DNR-CCA      PT/OT Eval Status: Not yet ordered - from [facility] [place]. Dispo - Remains in ICU. Here for the foreseeable future.      Leilani Davis MD

## 2021-10-12 NOTE — CONSULTS
[address] [facility]   Cardiovascular Evaluation    PATIENT: Hanna Fuchs  DATE: 10/12/2021  MRN: [id number]  CSN: [id number]  : 1935    Primary Care Doctor/Referring provider: Ari Rey MD, Bess Devi MD     Reason for evaluation/Chief complaint:   Shortness of Breath (pt arrived from [facility] with c/o SOB; normally on rm air- pt was placed on [email or web address] at [facility], operated by [facility]; recent xray-suspected pneumonia)    Subjective:    History of present illness on initial date of evaluation:   Hanna Fuchs is a 81 y.o. patient who presents from a SNF for the evaluation of SOB. The patient is currently admitted to the medical intensive care unit and in respiratory isolation for possible concerns of Covid pneumonia. He was transferred from an outside facility for possible concerns of suspected Covid pneumonia. Patient unfortunately has a very protracted course over the past couple of months. She was diagnosed with an abdominal mass which was found during preoperative evaluation for knee replacement. Patient underwent evaluation and eventual surgical treatment at an outside hospital for this abdominal pathology. 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 Meantime, the patient was transferred to rehabilitation in a skilled nursing facility. She had been declining over the past few days which prompted evaluation in our emergency room. While in the emergency room, the patient underwent screening troponin levels which were elevated. This prompted cardiovascular consultation. There is no history of present illness of chest pain. Patient unfortunately is in the medical intensive care unit currently and unable to give detail information regarding history of present illness. Majority information is taken from hospital chart and observation.       Patient Active Problem List   Diagnosis    Right TKR    Prepatellar bursitis of right knee    Localized osteoarthritis of left knee    Primary localized osteoarthritis of left knee    Status post total knee replacement, left    CAD (coronary artery disease)    Hyperlipidemia    Hypertension    CKD (chronic kidney disease)    Pelvic mass    Generalized abdominal mass    Dislodged Lerma catheter (HCC)    Type 2 diabetes mellitus with hyperosmolarity (HCC)    MARGY (acute kidney injury) ([address]    HCAP (healthcare-associated pneumonia)    New onset atrial fibrillation (HCC)    Sepsis ([address]    Stage 3a chronic kidney disease ([address]    Severe mitral regurgitation         Cardiac Testing: I have reviewed the findings below. EKG:  ECHO:   STRESS TEST:  CATH:  BYPASS:  VASCULAR:    Past Medical History:   has a past medical history of Arthritis, CAD (coronary artery disease), Carotid stenosis, History of blood transfusion, Hyperlipidemia, Hypertension, Impaired mobility, Kidney disease, Mitral valve disease, OA (osteoarthritis), Peripheral vascular disease ([address] Thyroid disease, and Urinary frequency. Surgical History:   has a past surgical history that includes Tonsillectomy; Elbow surgery; Coronary angioplasty with stent; joint replacement (8/30/11 ); Colonoscopy (2/2012); and Total knee arthroplasty (Left, 7/12/2021). Social History:   reports that she has quit smoking. She has never used smokeless tobacco. She reports current alcohol use. She reports that she does not use drugs. Family History:  No evidence for sudden cardiac death or premature CAD    Medications:  Reviewed and are listed in nursing record. and/or listed below  Outpatient Medications:  Prior to Admission medications    Medication Sig Start Date End Date Taking?  Authorizing Provider   acetaminophen (TYLENOL) 325 MG tablet Take 650 mg by mouth every 6 hours as needed for Pain   Yes Historical Provider, MD   polyethylene glycol (MIRALAX) 17 g PACK packet Take 17 g by mouth daily   Yes Historical Provider, MD   nitroGLYCERIN (NITROSTAT) 0.4 MG SL tablet Place 0.4 mg under the tongue every 5 minutes as needed for Chest pain up to max of 3 total doses. If no relief after 1 dose, call 911. Yes Historical Provider, MD   oxyCODONE (ROXICODONE) 5 MG immediate release tablet Take 5 mg by mouth every 6 hours as needed for Pain. Yes Historical Provider, MD   aspirin 81 MG EC tablet Take 81 mg by mouth daily   Yes Historical Provider, MD   lisinopril-hydroCHLOROthiazide (PRINZIDE;ZESTORETIC) 20-12.5 MG per tablet Take 1 tablet by mouth daily 5/6/21  Yes Historical Provider, MD   sennosides-docusate sodium (SENOKOT-S) 8.6-50 MG tablet Take 1 tablet by mouth 2 times daily  Patient taking differently: Take 1 tablet by mouth daily  7/14/21  Yes LINDSAY Fortune CNP   levothyroxine (SYNTHROID) 50 MCG tablet Take 50 mcg by mouth Daily  2/27/19  Yes Historical Provider, MD   therapeutic multivitamin-minerals (THERAGRAN-M) tablet Take 1 tablet by mouth daily. Yes Historical Provider, MD   fenofibrate (TRIGLIDE) 160 MG tablet Take 160 mg by mouth daily. Yes Historical Provider, MD   amlodipine (NORVASC) 5 MG tablet Take 5 mg by mouth 2 times daily. Yes Historical Provider, MD   metoprolol (LOPRESSOR) 25 MG tablet Take 25 mg by mouth 2 times daily. Yes Historical Provider, MD   atorvastatin (LIPITOR) 40 MG tablet Take 40 mg by mouth nightly. Yes Historical Provider, MD   Cholecalciferol (VITAMIN D3) 2000 UNIT CAPS Take 2,000 Units by mouth daily.      Yes Historical Provider, MD       In-patient schedule medications:   mupirocin   Nasal BID    cefepime  2,000 mg IntraVENous Q12H    aspirin  81 mg Oral Daily    atorvastatin  40 mg Oral Nightly    levothyroxine  50 mcg Oral Daily    metoprolol tartrate  25 mg Oral BID    sennosides-docusate sodium  1 tablet Oral Daily    polyethylene glycol  17 g Oral Daily    therapeutic multivitamin-minerals  1 tablet Oral Daily    enoxaparin  40 mg SubCUTAneous BID         Infusion Medications:   insulin 5.72 Units/hr (10/12/21 0509)    dextrose 5% in lactated ringers 150 mL/hr at 10/12/21 5215         Allergies:  Patient has no known allergies. Review of Systems:   14 point review of systems unable to be completed due to patient condition/cooperation. All available positives mentioned in history of present illness. Physical Examination:    [unfilled]  /85   Pulse 97   Temp 97.4 °F (36.3 °C) (Oral)   Resp 27   Ht 5' 4\" (1.626 m)   Wt 126 lb 8.7 oz (57.4 kg)   SpO2 98%   BMI 21.72 kg/m²    Weight: 126 lb 8.7 oz (57.4 kg)     Wt Readings from Last 3 Encounters:   10/11/21 126 lb 8.7 oz (57.4 kg)   10/04/21 169 lb (76.7 kg)   08/16/21 169 lb (76.7 kg)       Intake/Output Summary (Last 24 hours) at 10/12/2021 0806  Last data filed at 10/12/2021 0630  Gross per 24 hour   Intake 5111.93 ml   Output 350 ml   Net 4761.93 ml     Due to the current efforts to prevent transmission of COVID-19 and also the need to preserve PPE for other caregivers, an observational only full face-to-face encounter with the patient was performed. That being said, all relevant records and diagnostic tests were reviewed, including laboratory results and imaging. Please reference any relevant documentation elsewhere.  Care will be coordinated with the primary service    General Appearance:  Somnolent, minimal distress, appears stated age   Head:  Normocephalic, without obvious abnormality, atraumatic   Eyes:  PERRL, conjunctiva/corneas clear       Nose: Nares normal, no drainage or sinus tenderness   Throat: Lips, mucosa, and tongue normal   Neck: Supple, symmetrical, trachea midline, no adenopathy, thyroid: not enlarged, symmetric, no JVD       Lungs:   Respirations mildly labored and distress   Chest Wall:  deferred   Heart:  irregular rhythm and normal rate   Abdomen:   Not distended           Extremities: Extremities normal, atraumatic, no cyanosis or edema   Pulses: deferred   Skin: Skin color, texture, turgor normal, no rashes or lesions   Pysch: Altered affect   Neurologic: Observational exam with somnolence        Labs  Recent Labs     10/11/21  1458 10/12/21  0418   WBC 14.4* 18.0*   HGB 9.2* 8.8*   HCT 28.3* 26.6*   MCV 84.4 81.9    228     Recent Labs     10/11/21  2346 10/12/21  0418   CREATININE 1.5* 1.5*  1.5*   * 119*  121*   * 127*  127*   K 5.0 5.1  5.0   CL 93* 95*  94*   CO2 19* 23  23     Recent Labs     10/12/21  0418   INR 1.30*   PROTIME 14.8*     Recent Labs     10/11/21  1458 10/11/21  1836   TROPONINI 0.16* 0.15*     Invalid input(s): PRO-BNP  No results for input(s): CHOL, HDL in the last 72 hours. Invalid input(s): LDL, TG      Imaging:  I have reviewed the below testing personally and my interpretation is below. EKG    Atrial fibrillationCannot rule out Inferior infarct , age undeterminedST abnormality inferolateral leads consider ischemiaNon-specific intra-ventricular conduction delayAbnormal ECGWhen compared with ECG of 11-OCT-2021 15:20,Inverted T waves have replaced nonspecific T wave abnormality :      CXR:      Assessment:  81 y.o. patient with:  Principal Problem:    Elevated troponin levels  Active Problems:    CAD (coronary artery disease)    MARGY (acute kidney injury) ([facility]    New onset atrial fibrillation (HCC)    Stage 3a chronic kidney disease (HCC)    Severe mitral regurgitation    Plan:  1. Elevated troponin levels appear consistent with demand ischemia and poor clearance. 2. No indication for acute cardiac needs beyond supportive care  3. Atrial fibrillation appears rate controlled. 4. Agree with AC if not contraindicated while in house  5. Echocardiogram review    Medical Decision Making:   The following items were considered in medical decision making:  Independent review of images  Review / order clinical lab tests  Review / order radiology tests  Decision to obtain old records  Review and summation of old records as accessed through [facility] (a summary of my findings in these old records)        All questions and concerns were addressed to the patient/family. Alternatives to my treatment were discussed. The note was completed using EMR. Every effort was made to ensure accuracy; however, inadvertent computerized transcription errors may be present.     Mala Hawk MD, [id number], [facility]  [phone] [place] office  [phone] [facility]  10/12/2021  8:06 AM

## 2021-10-13 NOTE — CONSULTS
Thank you for considering [facility] for inpatient consultation. Noted that the patient was recently seen and followed by [facility]      Please direct all renal related questions to  [facility] for this patient- [phone]      Informed: Dr Anthony Iverson MD  [facility] nephrology  [facility] [place]  [phone]

## 2021-10-13 NOTE — CONSULTS
Infectious Diseases   Consult Note      Reason for Consult:  Enterococcal bacteremia    Requesting Physician:  Dr. Martha Toledo       Date of Admission: 10/11/2021  Subjective:   CHIEF COMPLAINT:   None given       HPI:    Hang Gilliam is an 81yoF with history of OA, CAD, PAD, HLD, HTN, CKD                S/p L TKA 7/12/21  Prior to that she was in a wheelchair. Since then has been bedbound with contracture LLE. Admission [facility] 8/17-8/24/21 for excision of 20cm plevic mass, s/p SUKHI, BSO completed on 8/20/21, path benign. Discharged to SNF. Has required velazquez catheter prior to SUKHI due to obstruction. More recently was wearing a brief. ED 10/11/21 - SOB, L knee pain, fatigue  WBC elevated at 14,lactic 2.9, procal 2  Very uremic  Liver enzymes mildly elevated  CXR with noderate sushma lower lobe airspace disease, small R effusion  She was admitted with working diagnosis pneumonia and started on linezolid and cefepime. Admitted to ICU. Blood cultures collected on admission are positive for E faecalis  She has an unstageable sacral pressure ulcer. She is consistently AF   WBC down today at 16     Her primary complaint is L leg pain.        Current abx:  Unasyn 3g q12       Past Surgical History:       Diagnosis Date    Arthritis     CAD (coronary artery disease)     MI    Carotid stenosis     History of blood transfusion     s/p Rt TKR    Hyperlipidemia     Hypertension     Impaired mobility     recent falls - uses W/C    Kidney disease     stage 3 kidney disease    Mitral valve disease     and Aortic    OA (osteoarthritis)     Peripheral vascular disease ([facility] [address]     Thyroid disease     Urinary frequency          Procedure Laterality Date    COLONOSCOPY  2/2012    CORONARY ANGIOPLASTY WITH STENT PLACEMENT      2009    ELBOW SURGERY      left    JOINT REPLACEMENT  8/30/11     right total knee replacement    TONSILLECTOMY      TOTAL KNEE ARTHROPLASTY Left 7/12/2021    LEFT TOTAL KNEE ARTHROPLASTY WITH ADDUCTOR CANAL BLOCK FOR PAIN CONTROL              MEDACTA performed by Nicolas Veloz MD at [place] [address] History:    TOBACCO:   reports that she has quit smoking. She has never used smokeless tobacco.  ETOH:   reports current alcohol use. There is no history of illicit drug use or other significant epidemiologic exposures.       Family History:       Problem Relation Age of Onset    Heart Disease Mother     Cancer Mother         ovarian    Cancer Father         lulu       Current Medications:    Current Facility-Administered Medications: mupirocin (BACTROBAN) 2 % ointment, , Nasal, BID  oxyCODONE (ROXICODONE) immediate release tablet 5 mg, 5 mg, Oral, Q4H PRN  Venelex ointment, , Topical, BID  insulin glargine (LANTUS) injection vial 20 Units, 20 Units, SubCUTAneous, Nightly  glucose (GLUTOSE) 40 % oral gel 15 g, 15 g, Oral, PRN  dextrose 50 % IV solution, 12.5 g, IntraVENous, PRN  glucagon (rDNA) injection 1 mg, 1 mg, IntraMUSCular, PRN  dextrose 5 % solution, 100 mL/hr, IntraVENous, PRN  insulin lispro (HUMALOG) injection vial 0-12 Units, 0-12 Units, SubCUTAneous, TID WC  insulin lispro (HUMALOG) injection vial 0-6 Units, 0-6 Units, SubCUTAneous, Nightly  ampicillin-sulbactam (UNASYN) 3000 mg ivpb minibag, 3,000 mg, IntraVENous, Q12H  dextrose 50 % IV solution, 12.5 g, IntraVENous, PRN  dextrose 5 % in lactated ringers infusion, , IntraVENous, Continuous PRN  aspirin EC tablet 81 mg, 81 mg, Oral, Daily  atorvastatin (LIPITOR) tablet 40 mg, 40 mg, Oral, Nightly  levothyroxine (SYNTHROID) tablet 50 mcg, 50 mcg, Oral, Daily  metoprolol tartrate (LOPRESSOR) tablet 25 mg, 25 mg, Oral, BID  sennosides-docusate sodium (SENOKOT-S) 8.6-50 MG tablet 1 tablet, 1 tablet, Oral, Daily  polyethylene glycol (GLYCOLAX) packet 17 g, 17 g, Oral, Daily  therapeutic multivitamin-minerals 1 tablet, 1 tablet, Oral, Daily  enoxaparin (LOVENOX) injection 40 mg, 40 mg, SubCUTAneous, BID  ondansetron (ZOFRAN) injection 4 mg, 4 mg, IntraVENous, Q6H PRN  acetaminophen (TYLENOL) tablet 650 mg, 650 mg, Oral, Q4H PRN **OR** acetaminophen (TYLENOL) suppository 650 mg, 650 mg, Rectal, Q4H PRN  perflutren lipid microspheres (DEFINITY) injection 1.65 mg, 1.5 mL, IntraVENous, ONCE PRN      No Known Allergies       REVIEW OF SYSTEMS:    CONSTITUTIONAL:   Per HPI   EYES:  negative for blurred vision, eye discharge, acute visual disturbance and icterus  HEENT:  negative for acute hearing loss, tinnitus, ear drainage, sinus pressure, nasal congestion, epistaxis and snoring  RESPIRATORY:  No cough, hemoptysis  CARDIOVASCULAR:  negative for chest pain, palpitations, exertional chest pressure/discomfort, syncope  GASTROINTESTINAL:  negative for nausea, vomiting, diarrhea, constipation, blood in stool and abdominal pain  GENITOURINARY:   Per HPI. No gross hematuria  HEMATOLOGIC/LYMPHATIC:  negative for easy bruising, bleeding and lymphadenopathy  ALLERGIC/IMMUNOLOGIC:  negative for recurrent infections, angioedema, anaphylaxis and drug reactions  ENDOCRINE:  negative for weight changes and diabetic symptoms including polyuria, polydipsia and polyphagia  MUSCULOSKELETAL:   Per HPI   NEUROLOGICAL:  negative for headaches, slurred speech, unilateral weakness  PSYCHIATRIC/BEHAVIORAL: negative for hallucinations, behavioral problems, confusion and agitation. Objective:   PHYSICAL EXAM:      VITALS:  /81   Pulse 109   Temp 98 °F (36.7 °C) (Oral)   Resp 16   Ht 5' 4\" (1.626 m)   Wt 115 lb 8.3 oz (52.4 kg)   SpO2 91%   BMI 19.83 kg/m²      24HR INTAKE/OUTPUT:      Intake/Output Summary (Last 24 hours) at 10/13/2021 1104  Last data filed at 10/13/2021 0640  Gross per 24 hour   Intake 110 ml   Output 425 ml   Net -315 ml     CONSTITUTIONAL:   Frail elderly female  Flat affect   Awake, alert, cooperative, no apparent distress  HEENT: NCAT, PERRL, EOMI. Sclera white, conjunctiva full.   OP with moist mucosal membranes, no thrush, tongue protrudes midline  NECK:  Supple, symmetrical, trachea midline, no adenopathy  LUNGS:  no increased work of breathing, CTA sushma without W/R/R  CARDIOVASCULAR:  RRR with prominent murmur LSB   ABDOMEN:  Soft, flat, NT.  +BS  Well healed surgical site   MUSCULOSKELETAL:  Contracture LLE  No effusion knees noted  SKIN:  normal skin color, texture, turgor and no redness, warmth, or swelling. No palpable nodules or stigmata of embolic phenomenon  NEUROLOGIC: nonfocal exam  ACCESS:   IV site ok       DATA:    Old records have been reviewed    CBC:  Recent Labs     10/11/21  1458 10/12/21  0418 10/13/21  0426   WBC 14.4* 18.0* 16.5*   RBC 3.36* 3.25* 3.80*   HGB 9.2* 8.8* 10.2*   HCT 28.3* 26.6* 31.2*    228 300   MCV 84.4 81.9 82.0   MCH 27.3 27.1 26.9   MCHC 32.4 33.1 32.8   RDW 18.4* 17.6* 18.5*      BMP:  Recent Labs     10/12/21  1248 10/12/21  1601 10/13/21  0426   * 127* 128*   K 5.1 5.5* 5.9*   CL 95* 94* 97*   CO2 24 23 23   * 118* 112*   CREATININE 1.4* 1.3* 1.2   CALCIUM 9.0 9.0 9.1   GLUCOSE 142* 139* 133*        Cultures:   10/11 BC x2 E faecalis, Анна negative, MARK pending    COVID NAAT, PCR neg    UC NGTD       Radiology Review:  All pertinent images / reports were reviewed as a part of this visit. TTE 10/12/21   Summary   The left ventricle is normal in size with concentric hypertrophy. Left ventricular systolic function is normal with visually estimated LVEF of   55-60%. No obvious regional wall motion abnormalities. Elevated left ventricular filling pressure. The right ventricle is normal in size. Right ventricular systolic function is reduced. Severe left atrial enlargement. Severely calcified aortic valve with reduced excursion. Aortic valve   gradients and visual appearance of the valve are most consistent with severe   low flow low gradient aortic stenosis. Trace aortic regurgitation.    The mitral valve leaflets appear myxomatous with severe posterior directed mitral regurgitation. Inadequate tricuspid valve regurgitation to estimate systolic pulmonary   artery pressure. No prior studies available for comparison. Assessment:     Patient Active Problem List   Diagnosis    Right TKR    Prepatellar bursitis of right knee    Localized osteoarthritis of left knee    Primary localized osteoarthritis of left knee    Status post total knee replacement, left    CAD (coronary artery disease)    Hyperlipidemia    Hypertension    CKD (chronic kidney disease)    Pelvic mass    Generalized abdominal mass    Dislodged Lerma catheter ([address]    Type 2 diabetes mellitus with hyperosmolarity ([address]    MARGY (acute kidney injury) ([address]    HCAP (healthcare-associated pneumonia)    New onset atrial fibrillation ([address]    Sepsis ([address]    Stage 3a chronic kidney disease ([address]    Severe mitral regurgitation    Elevated troponin         Enterococcal bacteremia   Source unclear   The surgical site is well healed without local signs of SSI     Note mild elevation liver enzymes on admission - HB pathology will need to be excluded  She has required urinary catheterization over the last several months, so could be  source; pyuria noted, UC pending   She has valvular heart disease and a murmur heightening concern for endovascular infection   Would not associate this with LRTI. The pressure ulcer does not appear actively infected  VRE screen is negative  -pending full sensitivities, give vanc along with Unasyn   -HIRAM will need to be considered commensurate with goals of care   -will need continued IV abx after DC. Hold off on CVC until bacteremia has cleared  -get repeat BC in AM   -CT ap to evaluate for GI source of bacteremia     AoCKD with severe uremia     Valvular heart disease     S/p L TKA 7/2021    NKARIK       D/w patient and dtr Saurabh Chavez   D/w RN   Will follow        Cristian Casey M.D. Thank you for the opportunity to participate in the care of your patient.     Please do not hesitate to contact me:   [phone] office

## 2021-10-13 NOTE — PROGRESS NOTES
[facility] Daily Progress Note      Admit Date:  10/11/2021    Subjective:  Ms. Daniella Martínez is being seen for f/u afib, MV and AV disease, and ECHO results. She is resting comfortably but slightly confused. She denies specific complaints. Tele reviewed shows afib 80-90bpm. Daughter at bedside.      Objective:   /72   Pulse 98   Temp 97 °F (36.1 °C) (Oral)   Resp 16   Ht 5' 4\" (1.626 m)   Wt 115 lb 8.3 oz (52.4 kg)   SpO2 96%   BMI 19.83 kg/m²     Intake/Output Summary (Last 24 hours) at 10/13/2021 1434  Last data filed at 10/13/2021 1200  Gross per 24 hour   Intake 110 ml   Output 470 ml   Net -360 ml       TELEMETRY: Atrial fibrillation     Physical Exam:  General:  Awake, mild confusion; debilitated and chronically-ill appearing; NAD  Skin:  Warm and dry  Neck:  JVD none  Chest:  Clear to auscultation, respiration easy  Cardiovascular:  +irregularly irregular; S1S2  Abdomen:  Soft NT  Extremities:  no edema    Medications:    vancomycin (VANCOCIN) intermittent dosing (placeholder)   Other RX Placeholder    mupirocin   Nasal BID    Venelex   Topical BID    insulin glargine  20 Units SubCUTAneous Nightly    insulin lispro  0-12 Units SubCUTAneous TID WC    insulin lispro  0-6 Units SubCUTAneous Nightly    ampicillin-sulbactam  3,000 mg IntraVENous Q12H    aspirin  81 mg Oral Daily    atorvastatin  40 mg Oral Nightly    levothyroxine  50 mcg Oral Daily    metoprolol tartrate  25 mg Oral BID    sennosides-docusate sodium  1 tablet Oral Daily    polyethylene glycol  17 g Oral Daily    therapeutic multivitamin-minerals  1 tablet Oral Daily    enoxaparin  40 mg SubCUTAneous BID      dextrose      dextrose 5% in lactated ringers Stopped (10/12/21 1543)     iohexol, oxyCODONE, glucose, dextrose, glucagon (rDNA), dextrose, dextrose, dextrose 5% in lactated ringers, ondansetron, acetaminophen **OR** acetaminophen, perflutren lipid microspheres    Lab Data:  CBC:   Recent Labs 10/11/21  1458 10/12/21  0418 10/13/21  0426   WBC 14.4* 18.0* 16.5*   HGB 9.2* 8.8* 10.2*   HCT 28.3* 26.6* 31.2*   MCV 84.4 81.9 82.0    228 300     BMP:   Recent Labs     10/12/21  1248 10/12/21  1601 10/13/21  0426   * 127* 128*   K 5.1 5.5* 5.9*   CL 95* 94* 97*   CO2 24 23 23   PHOS 3.0 3.0 3.6   * 118* 112*   CREATININE 1.4* 1.3* 1.2     LIVER PROFILE:   Recent Labs     10/11/21  1458 10/12/21  0418 10/13/21  0426   AST 89* 96* 182*   * 130* 171*   BILITOT 1.0 0.7 0.8   ALKPHOS 94 80 70     PT/INR:   Recent Labs     10/12/21  0418 10/13/21  0426   PROTIME 14.8* 14.4*   INR 1.30* 1.26*       Cultures:   10/11   BC x2 E faecalis, Анна negative, MARK pending               COVID NAAT, PCR neg               UC NGTD       Lexiscan myoview 7/8/21  IMAGING FINDINGS:   LVEDV:   123ml     (Normal is up to 100ml for women and 150ml for men)   LVEF:   57 %      (Normal is at least 62% for women and 53% for men)   TRANSIENT DILATATION RATIO:    0.8      (Normal is up to 1.3 for women and 1.2 for men)   LV WALL MOTION:   Unremarkable   SPECT PERFUSION IMAGES:   Fixed area of decreased activity in the lateral wall of the left ventricle, with no significant change between the stress and rest images.  No reversible perfusion defects to suggest myocardial ischemia          ECHO 10/11/21Summary   The left ventricle is normal in size with concentric hypertrophy. Left ventricular systolic function is normal with visually estimated LVEF of 55-60%. No obvious regional wall motion abnormalities. Elevated left ventricular filling pressure. The right ventricle is normal in size. Right ventricular systolic function is reduced. Severe left atrial enlargement. Severely calcified aortic valve with reduced excursion. Aortic valve   gradients and visual appearance of the valve are most consistent with severe   low flow low gradient aortic stenosis. Trace aortic regurgitation.    The mitral valve leaflets appear myxomatous with severe posterior directed mitral regurgitation. Inadequate tricuspid valve regurgitation to estimate systolic pulmonary artery pressure. No prior studies available for comparison. CAD s/p PCI, Arrhythmia, Elevated troponin, severe MR, moderate AS  -  CAD with two-vessel PCI in 2008   -  LVEF 57% per MPI 7/8/2021.  No fixed or reversible perfusion defects.  -  TTE 5/16/2019 w/ severe MR and moderate AS. An echo in early May of 2019 had demonstrated her LVEF to be stable at 50% but the mitral valve to be significantly myxomatous with severe mitral insufficiency and an estimated RVSP of 52 mm Hg with a concurrent calcification of the aortic valve and a calculated HALLE of 1.4 cm² although the peak gradient was just 19 mm Hg and mean gradient just 10 mm Hg. The MR had increased significantly from the study of 2/17 while the HALLE measurements and estimated RVSP were generally stable. Assessment:  1. Atrial fibrillation:   -apparently new-onset and asymptomatic.    -high CHADs-vasc score=6   -continue metoprolol 25mg BID for HR control   -tele shows HR adequately controlled   -continue lovenox 40mg SQ BID for AC   -HR control and AC should be strategy; would not consider cardioversion or ablation    -debilitated and severe valve disease precludes more aggressive intervention   -unlikely to maintain NSR and would revert to afib eventually    2.  Mitral and aortic valve disease:   -known severe MR since 2019 which had increased from 2017 study   -I personally reviewed ECHO from 10/11/21   -eccentric, posteriorly directed, severe jet of MR   -note AV appears significantly calcified and restricted but pressure gradients and velocity  are low; results technically mild AS but may be falsely low and AS could be more severe    -given debilitated condition and multiple med problems she would NOT be candidate for  surgical valve replacement and would treat medically as best possible    3. Elevated troponin:   -likely combination of ARF and infection   -EKG without ischemic ST changes    -low clinical suspicion for ACS   -would not pursue ischemia evaluation   -lexiscan myoview 7/8/21 showed no reversible ischemia; fixed lateral defect; EF 57%    4. Enterococcus Faecalis bacteremia:   -ID consulted   -source unknown but could be multiple sources (pulm, , abdominal, sacral decub)   -consider HIRAM only if no other source found and procedure would change antibiotic choice  or duration of treatment; she is higher risk for anesthesia    -CT abdomen/pelvis pending    5. S/P Left TKR and abdominal hysterectomy summer of 2021    6. CAD:    -hx MV CAD and 2V PCI in 2008   -There are no concerning symptoms for angina currently.     -continue baby asa, lipitor 40mg qd, and lopressor 25mg BID    Palliative care consult pending.  coming in tomorrow to discuss. Note she follows Dr. Ashley Villanueva for cardiology and last OV was July 2021.        Patient Active Problem List    Diagnosis Date Noted    Elevated troponin     Type 2 diabetes mellitus with hyperosmolarity ([address] 10/11/2021    MARGY (acute kidney injury) ([address] 10/11/2021    HCAP (healthcare-associated pneumonia) 10/11/2021    New onset atrial fibrillation ([address] 10/11/2021    Sepsis ([address] 10/11/2021    Stage 3a chronic kidney disease ([address] 10/11/2021    Severe mitral regurgitation 10/11/2021    Dislodged Lerma catheter ([address]     Generalized abdominal mass 08/16/2021    CAD (coronary artery disease)     Hyperlipidemia     Hypertension     CKD (chronic kidney disease)     Pelvic mass     Status post total knee replacement, left 07/14/2021    Primary localized osteoarthritis of left knee 07/12/2021    Localized osteoarthritis of left knee 06/24/2021    Prepatellar bursitis of right knee 03/04/2020    Right TKR 08/30/2011       David Ch MD, MD 10/13/2021 2:34 PM

## 2021-10-13 NOTE — CONSULTS
Pharmacy Note  Vancomycin Consult    Toyin Bhagat is a 81 y.o. female started on Vancomycin for bacteremia; consult received from Dr. Brunilda Guevara to manage therapy. Also receiving the following antibiotics: Ampicillin-Sulbactam.    Allergies:  Patient has no known allergies. Recent Labs     10/12/21  1601 10/13/21  0426   CREATININE 1.3* 1.2     Recent Labs     10/12/21  0418 10/13/21  0426   WBC 18.0* 16.5*     Estimated Creatinine Clearance: 29 mL/min (based on SCr of 1.2 mg/dL). Intake/Output Summary (Last 24 hours) at 10/13/2021 1231  Last data filed at 10/13/2021 0640  Gross per 24 hour   Intake 110 ml   Output 425 ml   Net -315 ml     Wt Readings from Last 1 Encounters:   10/13/21 115 lb 8.3 oz (52.4 kg)       Body mass index is 19.83 kg/m². Culture Date      Source                       Results  10/11                 Blood                          Enterococcus faecalis, Streptococcus     Loading dose (critically ill or in ICU, require dialysis or renal replacement therapy): Vancomycin 25 mg/kg IVPB x 1 (maximum 3000 mg). Maintenance dose: 15 mg/kg (maximum: 2000 mg/dose and 4500 mg/day) starting at the next dosing interval determined by renal function  Pulse dose: fluctuating renal function, MARGY, ESRD   Goal Vancomycin trough: 10-15 mcg/mL or 15-20 mcg/mL   Goal Vancomycin AUC: 400-600     Assessment/Plan:  Will initiate Vancomycin with a one time loading dose of 1250 mg x 1. Vancomycin level ordered for tomorrow AM.   Timing of trough level will be determined based on culture results, renal function, and clinical response. Thank you for the consult. Brendan MarroquinD, BCPS   10/13/2021 12:34 PM

## 2021-10-13 NOTE — PROGRESS NOTES
Hospitalist Progress Note      PCP: Maryana Coreas MD    Date of Admission: 10/11/2021    Chief Complaint: SOB    Subjective: no new c/o. Medications:  Reviewed    Infusion Medications    dextrose      dextrose 5% in lactated ringers Stopped (10/12/21 7733)     Scheduled Medications    mupirocin   Nasal BID    Venelex   Topical BID    insulin glargine  20 Units SubCUTAneous Nightly    insulin lispro  0-12 Units SubCUTAneous TID WC    insulin lispro  0-6 Units SubCUTAneous Nightly    ampicillin-sulbactam  3,000 mg IntraVENous Q12H    aspirin  81 mg Oral Daily    atorvastatin  40 mg Oral Nightly    levothyroxine  50 mcg Oral Daily    metoprolol tartrate  25 mg Oral BID    sennosides-docusate sodium  1 tablet Oral Daily    polyethylene glycol  17 g Oral Daily    therapeutic multivitamin-minerals  1 tablet Oral Daily    enoxaparin  40 mg SubCUTAneous BID     PRN Meds: oxyCODONE, glucose, dextrose, glucagon (rDNA), dextrose, dextrose, dextrose 5% in lactated ringers, ondansetron, acetaminophen **OR** acetaminophen, perflutren lipid microspheres      Intake/Output Summary (Last 24 hours) at 10/13/2021 1019  Last data filed at 10/13/2021 0640  Gross per 24 hour   Intake 110 ml   Output 500 ml   Net -390 ml       Physical Exam Performed:    /81   Pulse 109   Temp 98 °F (36.7 °C) (Oral)   Resp 16   Ht 5' 4\" (1.626 m)   Wt 115 lb 8.3 oz (52.4 kg)   SpO2 91%   BMI 19.83 kg/m²     General appearance: No apparent distress, appears stated age and cooperative. HEENT: Pupils equal, round, and reactive to light. Conjunctivae/corneas clear. Neck: Supple, with full range of motion. No jugular venous distention. Trachea midline. Respiratory:  Normal respiratory effort. Clear to auscultation, bilaterally without Rales/Wheezes/Rhonchi. Cardiovascular: Regular rate and rhythm with normal S1/S2 without murmurs, rubs or gallops.   Abdomen: Soft, non-tender, non-distended with normal bowel sounds. Musculoskeletal: No clubbing, cyanosis or edema bilaterally. Full range of motion without deformity. Skin: Skin color, texture, turgor normal.  No rashes or lesions. Neurologic:  Neurovascularly intact without any focal sensory/motor deficits.  Cranial nerves: II-XII intact, grossly non-focal.  Psychiatric: Alert and oriented, thought content appropriate, normal insight  Capillary Refill: Brisk,< 3 seconds   Peripheral Pulses: +2 palpable, equal bilaterally       Labs:   Recent Labs     10/11/21  1458 10/12/21  0418 10/13/21  0426   WBC 14.4* 18.0* 16.5*   HGB 9.2* 8.8* 10.2*   HCT 28.3* 26.6* 31.2*    228 300     Recent Labs     10/12/21  1248 10/12/21  1601 10/13/21  0426   * 127* 128*   K 5.1 5.5* 5.9*   CL 95* 94* 97*   CO2 24 23 23   * 118* 112*   CREATININE 1.4* 1.3* 1.2   CALCIUM 9.0 9.0 9.1   PHOS 3.0 3.0 3.6     Recent Labs     10/11/21  1458 10/12/21  0418 10/13/21  0426   AST 89* 96* 182*   * 130* 171*   BILITOT 1.0 0.7 0.8   ALKPHOS 94 80 70     Recent Labs     10/12/21  0418 10/13/21  0426   INR 1.30* 1.26*     Recent Labs     10/11/21  1836 10/12/21  0814 10/12/21  1248   TROPONINI 0.15* 0.18* 0.20*       Urinalysis:      Lab Results   Component Value Date    NITRU Negative 10/11/2021    WBCUA 21-50 10/11/2021    BACTERIA 4+ 10/11/2021    RBCUA 0-2 10/11/2021    BLOODU Negative 10/11/2021    SPECGRAV 1.025 10/11/2021    GLUCOSEU 500 10/11/2021    GLUCOSEU NEGATIVE 08/18/2011       Consults:    IP CONSULT TO HOSPITALIST  IP CONSULT TO PALLIATIVE CARE  IP CONSULT TO CARDIOLOGY  IP CONSULT TO INFECTIOUS DISEASES      Assessment/Plan:    Active Hospital Problems    Diagnosis     Elevated troponin [R77.8]     Type 2 diabetes mellitus with hyperosmolarity (HCC) [E11.00]     MARGY (acute kidney injury) ([facility] [N17.9]     New onset atrial fibrillation ([facility] [I48.91]     Sepsis ([facility] [A41.9]     Stage 3a chronic kidney disease ([facility] [N18.31]     Severe mitral regurgitation [I34.0]     CAD (coronary artery disease) [I25.10]        Sepsis   -  of unclear sourse w/ Bld Cx demonstrating E Faecalis. Infectious Disease cnsulted and appreciated in advance. -  Empiric Abx: cefepime and linezolid - changed to Ampicillin 12 October. Further input per Infectious Disease recs.      DM2 w/ HHS  -  No prior diagnosis of diabetes. -  Start insulin infusion titrated per DKA/HHS nomogram.  -  Scheduled chemistry checks and electrolyte repletion per hospital protool. -  Crystalloid as above plus D5/LR when BGL < 250 mg/dL.     ARF/CKD - baseline stage 3 w/ elevated BUN/Cr ratio c/w pre-renal azotemia. Given IVF hydration and follow serial labs. Reviewed and documented as above. Held home Lisinopril, HCTZ.    CAD s/p PCI, Arrhythmia, Elevated troponin, severe MR, moderate AS  -  CAD with two-vessel PCI in 2008   -  LVEF 57% per MPI 7/8/2021. No fixed or reversible perfusion defects.  -  TTE 5/16/2019 w/ severe MR and moderate AS. -  Plateau pattern troponin elevation thus far c/w type 2 MI, likely r/t sepsis and arrythmia. Trend out troponin overnight.  -  Continue home ASA, statin, metoprolol. Held lisinopril, HCTZ, and amlodipine (MARGY and soft BP's). -  Hopefully HR will decrease w/ additional IV fluids. Will check another EKG if HR decreases in hopes of clarifying the rhythm. She is not requiring any additional treatment for rate control.  -  CHADS2-Vasc = 5. Anticoagulation with warfarin is indicated (valvular atrial fibrillation). Will start lovenox 40mg s.c. q12h. Updated TTE tomorrow.     HypoThyroid - clinically euthyroid on oral replacement therapy. Continue, w/ outpt monitoring as previously arranged. HypoNatremia - etiology clinically unable to determine but likely hypovolemic. Follow serial labs on IVF. Reviewed and documented as above. Anemia - etiology clinically unable to determine, w/out evidence of active bleeding/hemolysis.  Asymptomatic w/out indication for transfusion. Follow serial labs. Reviewed and documented as above.          DVT Prophylaxis: LMWH/IPC     Recent Labs     10/11/21  1458 10/12/21  0418 10/13/21  0426    228 300     Diet: ADULT DIET; Regular  Adult Oral Nutrition Supplement; Standard High Calorie/High Protein Oral Supplement  Code Status: DNR-CCA      PT/OT Eval Status: Not yet ordered - from [facility] AT [place]. Dispo - Remains in ICU. Here for the foreseeable future.      Ghulam Warner MD

## 2021-10-14 NOTE — PROGRESS NOTES
Infectious Disease Follow up Notes    CC :  Enterococcal bacteremia      Antibiotics:   vanc per pharmacy dosing     Admit Date:   10/11/2021  Hospital Day: 4    Subjective:   She remains afebrile   On RA   \"I am miserable, I want to stop\"    Objective:     Patient Vitals for the past 8 hrs:   BP Temp Pulse Resp SpO2   10/14/21 1503 107/65 97.4 °F (36.3 °C) 100  96 %   10/14/21 1100 109/72  100 14 98 %   10/14/21 1000 136/66  91 16 97 %   10/14/21 0900 138/75  86  98 %       EXAM:  General:  Alert, conversant  NAD  Flat affect     HEENT:  NCAT, PERRL, sclera anicteric     NECK:   Supple, symmetrical       LUNGS:   Non-labored breathing       EXT: No focal rash          LINE: IV site ok         Scheduled Meds:   vancomycin (VANCOCIN) intermittent dosing (placeholder)   Other RX Placeholder    mupirocin   Nasal BID    Venelex   Topical BID    insulin glargine  20 Units SubCUTAneous Nightly    insulin lispro  0-12 Units SubCUTAneous TID WC    insulin lispro  0-6 Units SubCUTAneous Nightly    ampicillin-sulbactam  3,000 mg IntraVENous Q12H    aspirin  81 mg Oral Daily    atorvastatin  40 mg Oral Nightly    levothyroxine  50 mcg Oral Daily    metoprolol tartrate  25 mg Oral BID    sennosides-docusate sodium  1 tablet Oral Daily    polyethylene glycol  17 g Oral Daily    therapeutic multivitamin-minerals  1 tablet Oral Daily    enoxaparin  40 mg SubCUTAneous BID       Continuous Infusions:   dextrose      dextrose 5% in lactated ringers Stopped (10/12/21 1543)          Data Review:    Lab Results   Component Value Date    WBC 16.2 (H) 10/14/2021    HGB 9.4 (L) 10/14/2021    HCT 28.6 (L) 10/14/2021    MCV 80.9 10/14/2021     10/14/2021     Lab Results   Component Value Date    CREATININE 1.3 (H) 10/14/2021     (HH) 10/14/2021     (L) 10/14/2021    K 4.8 10/14/2021    CL 95 (L) 10/14/2021    CO2 25 10/14/2021       Hepatic Function Panel:   Lab Results   Component Value Date    ALKPHOS 78 10/14/2021     10/14/2021     10/14/2021    PROT 5.0 10/14/2021    PROT 6.8 12/10/2012    BILITOT 0.9 10/14/2021    LABALBU 1.8 10/14/2021         MICRO:  10/11   BC x2 E faecalis, Анна negative, amp-S               COVID NAAT, PCR neg   UC >100kEscherichia coli   Antibiotic Interpretation MARK Status    ampicillin Sensitive <=8 mcg/mL     ceFAZolin Sensitive <=2 mcg/mL      NOTE: Cefazolin should only be used for uncomplicated UTI         for E.coli or Klebsiella pneumoniae. cefepime Sensitive <=2 mcg/mL     cefTRIAXone Sensitive <=1 mcg/mL     cefuroxime Sensitive 8 mcg/mL     ciprofloxacin Sensitive <=1 mcg/mL     ertapenem Sensitive <=0.5 mcg/mL     gentamicin Sensitive <=4 mcg/mL     meropenem Sensitive <=1 mcg/mL     nitrofurantoin Sensitive <=32 mcg/mL     piperacillin-tazobactam Sensitive <=16 mcg/mL     trimethoprim-sulfamethoxazole Sensitive <=2/38 mcg/mL       10/14 BC x2 NGTD       IMAGING:  CT ap 10/13/21  Impression   Large bilateral pleural effusions with large amount of bilateral atelectasis. Moderate anasarca.  Small amount of abdominal ascites.  No acute abdominal   abnormality identified.  Severely distended gallbladder.  No cholelithiasis   or finding of acute cholecystitis. TTE 10/12/21   Summary   The left ventricle is normal in size with concentric hypertrophy.   Left ventricular systolic function is normal with visually estimated LVEF of   55-60%.   No obvious regional wall motion abnormalities.   Elevated left ventricular filling pressure.   The right ventricle is normal in size.   Right ventricular systolic function is reduced.   Severe left atrial enlargement.   Severely calcified aortic valve with reduced excursion.  Aortic valve   gradients and visual appearance of the valve are most consistent with severe   low flow low gradient aortic stenosis.   Trace aortic regurgitation.   The mitral valve leaflets appear myxomatous with severe posterior directed   mitral regurgitation.   Inadequate tricuspid valve regurgitation to estimate systolic pulmonary   artery pressure.    No prior studies available for comparison. 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 ([address]     Aortic valve stenosis     Elevated troponin     Type 2 diabetes mellitus with hyperosmolarity ([address] 10/11/2021    MARGY (acute kidney injury) ([address] 10/11/2021    HCAP (healthcare-associated pneumonia) 10/11/2021    New onset atrial fibrillation ([address] 10/11/2021    Sepsis ([address] 10/11/2021    Stage 3a chronic kidney disease ([address] 10/11/2021    Severe mitral regurgitation 10/11/2021    Dislodged Lerma catheter ([facility]     Generalized abdominal mass 08/16/2021    CAD (coronary artery disease)      Overview Note:     MI      Hyperlipidemia     Hypertension     CKD (chronic kidney disease)     Pelvic mass     Status post total knee replacement, left 07/14/2021    Primary localized osteoarthritis of left knee 07/12/2021    Localized osteoarthritis of left knee 06/24/2021    Prepatellar bursitis of right knee 03/04/2020    Right TKR 08/30/2011       Enterococcal bacteremia   Source unclear   The surgical site is well healed without local signs of SSI     Elevated LFTs but no obvious pathology on CT. Distended GB without other fidings of acute cholecystitis   She has required urinary catheterization over the last several months, so could be  source; pyuria noted, UC pending   She has valvular heart disease and a murmur heightening concern for endovascular infection   Would not associate this with LRTI.   The pressure ulcer does not appear actively infected  -pending full sensitivities, give vanc along with Unasyn   -HIRAM could be considered commensurate with goals of care   -if full measures are to continue, she will need continued IV abx after BLANCHE Munguia with severe uremia      Valvular heart disease      S/p L TKA 7/2021     NKDA     Plan:    at bedside  Patient affirms her desire to focus on comfort and end of life care.    in agreement and understanding   [address] abx     Discussed with patient/family, all questions answered        Sharon Wray MD  Phone: [phone]   Fax : [phone]

## 2021-10-14 NOTE — PROGRESS NOTES
sounds. Musculoskeletal: No clubbing, cyanosis or edema bilaterally. Full range of motion without deformity. Skin: Skin color, texture, turgor normal.  No rashes or lesions. Neurologic:  Neurovascularly intact without any focal sensory/motor deficits.  Cranial nerves: II-XII intact, grossly non-focal.  Psychiatric: Alert and oriented, thought content appropriate, normal insight  Capillary Refill: Brisk,< 3 seconds   Peripheral Pulses: +2 palpable, equal bilaterally       Labs:   Recent Labs     10/12/21  0418 10/13/21  0426 10/14/21  0430   WBC 18.0* 16.5* 16.2*   HGB 8.8* 10.2* 9.4*   HCT 26.6* 31.2* 28.6*    300 257     Recent Labs     10/12/21  1601 10/13/21  0426 10/14/21  0430   * 128* 128*   K 5.5* 5.9* 4.8   CL 94* 97* 95*   CO2 23 23 25   * 112* 109*   CREATININE 1.3* 1.2 1.3*   CALCIUM 9.0 9.1 8.7   PHOS 3.0 3.6 3.7     Recent Labs     10/12/21  0418 10/13/21  0426 10/14/21  0430   AST 96* 182* 192*   * 171* 174*   BILITOT 0.7 0.8 0.9   ALKPHOS 80 70 78     Recent Labs     10/12/21  0418 10/13/21  0426 10/14/21  0430   INR 1.30* 1.26* 1.36*     Recent Labs     10/11/21  1836 10/12/21  0814 10/12/21  1248   TROPONINI 0.15* 0.18* 0.20*       Urinalysis:      Lab Results   Component Value Date    NITRU Negative 10/11/2021    WBCUA 21-50 10/11/2021    BACTERIA 4+ 10/11/2021    RBCUA 0-2 10/11/2021    BLOODU Negative 10/11/2021    SPECGRAV 1.025 10/11/2021    GLUCOSEU 500 10/11/2021    GLUCOSEU NEGATIVE 08/18/2011       Consults:    IP CONSULT TO HOSPITALIST  IP CONSULT TO PALLIATIVE CARE  IP CONSULT TO CARDIOLOGY  IP CONSULT TO INFECTIOUS DISEASES  IP CONSULT TO PHARMACY  IP CONSULT TO NEPHROLOGY  IP CONSULT TO HOSPICE      Assessment/Plan:    Active Hospital Problems    Diagnosis     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 ([facility] [I50.9, I38]     Aortic valve stenosis [I35.0]     Elevated troponin [R77.8]     Type 2 diabetes mellitus with hyperosmolarity (HCC) [E11.00]     [facility] [N17.9]     New onset atrial fibrillation ([facility] [I48.91]     Sepsis ([facility] [A41.9]     Stage 3a chronic kidney disease ([facility] [N18.31]     Severe mitral regurgitation [I34.0]     CAD (coronary artery disease) [I25.10]        Sepsis   -  of unclear sourse w/ Bld Cx demonstrating E Faecalis. Infectious Disease consulted and appreciated in advance. -  Empiric Abx: cefepime and linezolid - changed to Ampicillin 12 October and now d/c'd per patient request. Appreciated Infectious Disease recs.      DM2 w/ HHS  -  No prior diagnosis of diabetes. Now transitioned off Insulin gtt on Lantus/SSI.      ARF/CKD - baseline stage 3 w/ elevated BUN/Cr ratio c/w pre-renal azotemia. Given IVF hydration and follow serial labs. Reviewed and documented as above. Held home Lisinopril, HCTZ. Nephrology consulted and appreciated.      CAD s/p PCI, Arrhythmia, Elevated troponin, severe MR, moderate AS  -  CAD with two-vessel PCI in 2008   -  LVEF 57% per MPI 7/8/2021. No fixed or reversible perfusion defects.  -  TTE 5/16/2019 w/ severe MR and moderate AS.  -  Continued home ASA, statin, metoprolol. Held lisinopril, HCTZ, and amlodipine (MARGY and soft BP's). -  CHADS2-Vasc = 5. Anticoagulation with warfarin is indicated (valvular atrial fibrillation). Started lovenox 40mg s.c. q12h. Updated TTE 12 October w/ preserved EF. Cardiology consulted and appreciated.     HypoThyroid - clinically euthyroid on oral replacement therapy. Continue, w/ outpt monitoring as previously arranged. HypoNatremia - etiology clinically unable to determine but likely hypovolemic. Followed serial labs on IVF. Reviewed and documented as above. Anemia - etiology clinically unable to determine, w/out evidence of active bleeding/hemolysis. Stable and asymptomatic w/out indication for transfusion. Follow serial labs.   Reviewed and documented as above.          DVT Prophylaxis: None     Recent Labs     10/12/21  3882 10/13/21  0426 10/14/21  0430    300 257     Diet: ADULT DIET; Regular  Adult Oral Nutrition Supplement; Standard High Calorie/High Protein Oral Supplement  Code Status: DNR-CC discussed at bedside w/  present 14 October. PT/OT Eval Status: Not yet ordered - from [facility] [place]. Dispo - Transferred to floor 14 October. [address] consulted.   Anticipate IPU Friday 15 October     Serg Melgar MD

## 2021-10-14 NOTE — CARE COORDINATION
Palliative Care Initial Note  10/14/21  Palliative Care Admit date:10/11/21    Advance Directives: Code Status: DNR-CCA  (meds only)      Plan of care/goals:    Social/Spiritual:    Plan:    Reason for consult:    _x__ Advance Care Planning  _x_ Transition of Care Planning  ___ Psychosocial/Spiritual Support  ___ Symptom Management         Wenceslao Chand RN  Palliative Care Team

## 2021-10-14 NOTE — ACP (ADVANCE CARE PLANNING)
Advance Care Planning   Advance Care Planning Clinical Specialist  Conversation Note      Date of ACP Conversation: 10/14/2021  At bedside with patient and spouse    Conversation Conducted with:   Patient with Decision Making Capacity and spouse   Healthcare Decision Maker: Next of Kin by law (only applies in absence of above) spouse Shade Gao  ACP Clinical Specialist: Anastasia Bateman RN         Current Designated Health Care Decision Maker:   Primary Decision Maker: Barney Garcia - [phone]  (as entered in [address] field. Validate  this information as still accurate & up-to-date; edit [address] field as needed.)   Can this person be reached and be able to respond quickly, such as within a few minutes or hours? Yes     For below questions, when conducting conversation with [address], substitute \"she\" and \"her\" for \"you\" and \"your\". Hospitalization  If your health were to worsen and it became clear that your chance of recovery was unlikely, what would your preferences be regarding hospitalization?:    Choice:  x  The patient would want hospitalization  []  The patient would prefer comfort-focused treatment without hospitalization. Ventilation  If you were in your present state of health and suddenly became very ill and were unable to breathe on your own, what would your preference be about the use of a ventilator (breathing machine) if it were available to you? If patient would desire the use of a ventilator (breathing machine), answer \"yes\", if not \"no\":no    If your health were to worsen and it became clear that your chance of recovery was unlikely, would that change your answer? No    Resuscitation  CPR works best to restart the heart when there is a sudden event, like a heart attack, in someone who is otherwise healthy.  Unfortunately, CPR does not typically restart the heart for people who have serious health conditions or who are very sick. In the event your heart stopped, would you want attempts to restart your heart (answer \"yes\") or would you prefer a natural death (answer \"no\")? no    If your health were to worsen and it became clear that your chance of recovery was unlikely, would that change your answer? No    [x] Yes  [] No   Educated Patient / [place] [employer] regarding differences between Advance Directives and portable DNR orders. Length of ACP Conversation in minutes:  30 minutes    Conversation Outcomes:  [x] ACP discussion completed  [] Existing advance directive reviewed with patient; no changes to patient's previously recorded wishes   [] New Advance Directive completed   [] Portable Do Not Rescitate prepared for Provider review and signature  [] POLST/POST/MOLST/MOST prepared for Provider review and signature      Follow-up plan:    [] Schedule follow-up conversation to continue planning  [] Referred individual to Provider for additional questions/concerns   [] Advised patient/agent/surrogate to review completed ACP document and update if needed with changes in condition, patient preferences or care setting     [] This note routed to one or more involved healthcare providers     Patient has no AD's. Spouse is NOK decision maker.     Rai Carrasco RN  Palliative Care Team

## 2021-10-14 NOTE — CARE COORDINATION
Palliative Care Initial Note:  11/14/21  Palliative Care Admit date: 11/11/21    Advance Directives: No Ad's in place.  Terrial Gravely as Doc Chapel is Health Care Decision maker per [employer]. Patient is DNR - CCA (meds only)    Plan of care/goals:  at bedside, he and patient participated in discussion. Patient has Hx of ARF/CRF/unstageable decubitus coccyx/a fib/CHF. Spouse states he is unable to care for Lewis Mckeon at home anymore. Plan is dc to [facility] with transition to LTC. He states that he has resources for private pay. Plan: Discharge planned for [facility].      Reason for consult:    ___ Advance Care Planning  _x__ Transition of Care Planning  ___ Psychosocial/Spiritual Support  ___ Symptom Management    Isaura Mitchell RN  Palliative Care Team

## 2021-10-14 NOTE — CONSULTS
[facility] Consult Note           Reason for Consult:  Acute kidney injury  Requesting Physician:  Dr. Andrea Desai    Chief Complaint:  Shortness of breath  History Obtained From:  patient, electronic medical record    History of Present Ilness:    81year old female with CKD-4, HTN, CAD, PVD admitted with shortness of breath. We have been asked to assist in further mgmt of her MARGY. , SCr 1.8 on admission, trend improving with IVF's & holding her lisinopril/hctz. Treated for sepsis with enterococcus faecalis in blood cultures, E coli in urine. Initially required insulin drip for high blood sugars. Intake reduces, denies any shortness of breath, abdominal pain, decreased mentation, or fevers. Past Medical History:        Diagnosis Date    Arthritis     CAD (coronary artery disease)     MI    Carotid stenosis     History of blood transfusion     s/p Rt TKR    Hyperlipidemia     Hypertension     Impaired mobility     recent falls - uses W/C    Kidney disease     stage 3 kidney disease    Mitral valve disease     and Aortic    OA (osteoarthritis)     Peripheral vascular disease (HCC)     Thyroid disease     Urinary frequency        Past Surgical History:        Procedure Laterality Date    COLONOSCOPY  2/2012    CORONARY ANGIOPLASTY WITH STENT PLACEMENT      2009    ELBOW SURGERY      left    JOINT REPLACEMENT  8/30/11     right total knee replacement    TONSILLECTOMY      TOTAL KNEE ARTHROPLASTY Left 7/12/2021    LEFT TOTAL KNEE ARTHROPLASTY WITH ADDUCTOR CANAL BLOCK FOR PAIN CONTROL              MEDACTA performed by Kyleigh Murillo MD at [address] Medications:    No current facility-administered medications on file prior to encounter.      Current Outpatient Medications on File Prior to Encounter   Medication Sig Dispense Refill    acetaminophen (TYLENOL) 325 MG tablet Take 650 mg by mouth every 6 hours as needed for Pain      polyethylene glycol (MIRALAX) 17 g PACK packet Take 17 g by mouth daily      nitroGLYCERIN (NITROSTAT) 0.4 MG SL tablet Place 0.4 mg under the tongue every 5 minutes as needed for Chest pain up to max of 3 total doses. If no relief after 1 dose, call 911.  oxyCODONE (ROXICODONE) 5 MG immediate release tablet Take 5 mg by mouth every 6 hours as needed for Pain.  aspirin 81 MG EC tablet Take 81 mg by mouth daily      lisinopril-hydroCHLOROthiazide (PRINZIDE;ZESTORETIC) 20-12.5 MG per tablet Take 1 tablet by mouth daily      sennosides-docusate sodium (SENOKOT-S) 8.6-50 MG tablet Take 1 tablet by mouth 2 times daily (Patient taking differently: Take 1 tablet by mouth daily )      levothyroxine (SYNTHROID) 50 MCG tablet Take 50 mcg by mouth Daily   1    therapeutic multivitamin-minerals (THERAGRAN-M) tablet Take 1 tablet by mouth daily.  fenofibrate (TRIGLIDE) 160 MG tablet Take 160 mg by mouth daily.  amlodipine (NORVASC) 5 MG tablet Take 5 mg by mouth 2 times daily.  metoprolol (LOPRESSOR) 25 MG tablet Take 25 mg by mouth 2 times daily.  atorvastatin (LIPITOR) 40 MG tablet Take 40 mg by mouth nightly.  Cholecalciferol (VITAMIN D3) 2000 UNIT CAPS Take 2,000 Units by mouth daily. Allergies:  Patient has no known allergies.     Social History:    Social History     Socioeconomic History    Marital status:      Spouse name: Not on file    Number of children: Not on file    Years of education: Not on file    Highest education level: Not on file   Occupational History    Not on file   Tobacco Use    Smoking status: Former Smoker    Smokeless tobacco: Never Used    Tobacco comment: smoked in her 25s    Vaping Use    Vaping Use: Never used   Substance and Sexual Activity    Alcohol use: Yes     Comment: occasional    Drug use: No    Sexual activity: Not Currently     Partners: Male   Other Topics Concern    Not on file   Social History Narrative  Not on file     Social Determinants of Health     Financial Resource Strain:     Difficulty of Paying Living Expenses:    Food Insecurity:     Worried About Running Out of Food in the Last Year:     [address] in the Last Year:    Transportation Needs:     Lack of Transportation (Medical):  Lack of Transportation (Non-Medical):    Physical Activity:     Days of Exercise per Week:     Minutes of Exercise per Session:    Stress:     Feeling of Stress :    Social Connections:     Frequency of Communication with Friends and Family:     Frequency of Social Gatherings with Friends and Family:     Attends [employer] Services:     Active Member of Clubs or Organizations:     Attends Club or Organization Meetings:     Marital Status:    Intimate Partner Violence:     Fear of Current or Ex-Partner:     Emotionally Abused:     Physically Abused:     Sexually Abused:        Family History:   Family History   Problem Relation Age of Onset    Heart Disease Mother     Cancer Mother         ovarian    Cancer Father         luekemia       Review of Systems:   Pertinent positives stated above in HPI. Remainder of 10 point review of systems were reviewed and were negative.     Physical exam:   Constitutional:  VITALS:  /72   Pulse 100   Temp 98 °F (36.7 °C) (Oral)   Resp 14   Ht 5' 4\" (1.626 m)   Wt 140 lb 3.4 oz (63.6 kg)   SpO2 98%   BMI 24.07 kg/m²   Gen: alert, awake, nad  Skin: no rash, turgor wnl  Heent:  eomi, mmm  Neck: no bruits or jvd noted, thyroid normal  Cardiovascular:  S1, S2, irregular without m/r/g  Respiratory: CTA B without w/r/r; respiratory effort normal  Abdomen:  +bs, soft, nt, nd, no hepatosplenomegaly  Ext: no lower extremity edema  Psychiatric: mood and affect appropriate; judgement and insight intact  Musculoskeletal:  Rom, muscular strength limited; digits, nails normal    Data/  CBC:   Lab Results   Component Value Date    WBC 16.2 10/14/2021    RBC 3.53 10/14/2021    HGB 9.4 10/14/2021    HCT 28.6 10/14/2021    MCV 80.9 10/14/2021    MCH 26.6 10/14/2021    MCHC 32.9 10/14/2021    RDW 18.3 10/14/2021     10/14/2021    MPV 9.0 10/14/2021     BMP:    Lab Results   Component Value Date     10/14/2021    K 4.8 10/14/2021    K 3.9 08/17/2021    CL 95 10/14/2021    CO2 25 10/14/2021     10/14/2021    LABALBU 1.8 10/14/2021    CREATININE 1.3 10/14/2021    CALCIUM 8.7 10/14/2021    GFRAA 47 10/14/2021    GFRAA 35 04/08/2013    LABGLOM 39 10/14/2021    GLUCOSE 105 10/14/2021         Assessment/    - Acute kidney injury - pre-renal in setting of sepsis, hyperglycemia; better with IVF's    - Chronic kidney disease stage 4 - secondary to HTN, baseline SCr 1.4-1.8, followed with Dr. Debbie Pyle in our office    - Hyperkalemia - better with insulin, lokelma, & improving renal function    - Enterococcus bacteremia/E coli UTI - plans per ID    - Bilateral pleural effusions    - Atrial fibrillation    - DM2      Plan/    - Trial off IVF's today  - Lisinopril/hctz on hold  - Trend labs, bp's, & urine output    Case d/w Nursing & Family  Okay for transfer to floor    Thank you for the consultation. Please do not hesitate to call with questions. ____________________________________  Jaime Mayfield MD  [facility]  [email or web address]  Office: [phone]

## 2021-10-14 NOTE — PROGRESS NOTES
[facility] Daily Progress Note      Admit Date:  10/11/2021    Subjective:  Ms. Josue Flores is being seen for f/u afib, MV and AV disease, and ECHO results. She is somnolent today. She denies specific complaints other than back pain from laying flat.  Tele reviewed shows afib 90'sbpm.     Objective:   BP (!) 140/92   Pulse 102   Temp 97.8 °F (36.6 °C) (Axillary)   Resp 22   Ht 5' 4\" (1.626 m)   Wt 140 lb 3.4 oz (63.6 kg)   SpO2 97%   BMI 24.07 kg/m²       Intake/Output Summary (Last 24 hours) at 10/14/2021 0736  Last data filed at 10/14/2021 0710  Gross per 24 hour   Intake 1549.19 ml   Output 510 ml   Net 1039.19 ml       TELEMETRY: Atrial fibrillation     Physical Exam:  General:  Awake, mild confusion; debilitated and chronically-ill appearing; NAD  Skin:  Warm and dry  Neck:  JVD none  Chest:  Clear to auscultation, respiration easy  Cardiovascular:  +irregularly irregular with soft PRIYA; S1S2  Abdomen:  Soft NT  Extremities:  no edema    Medications:    vancomycin  500 mg IntraVENous Once    vancomycin (VANCOCIN) intermittent dosing (placeholder)   Other RX Placeholder    mupirocin   Nasal BID    Venelex   Topical BID    insulin glargine  20 Units SubCUTAneous Nightly    insulin lispro  0-12 Units SubCUTAneous TID WC    insulin lispro  0-6 Units SubCUTAneous Nightly    ampicillin-sulbactam  3,000 mg IntraVENous Q12H    aspirin  81 mg Oral Daily    atorvastatin  40 mg Oral Nightly    levothyroxine  50 mcg Oral Daily    metoprolol tartrate  25 mg Oral BID    sennosides-docusate sodium  1 tablet Oral Daily    polyethylene glycol  17 g Oral Daily    therapeutic multivitamin-minerals  1 tablet Oral Daily    enoxaparin  40 mg SubCUTAneous BID      sodium chloride 60 mL/hr at 10/14/21 0700    dextrose      dextrose 5% in lactated ringers Stopped (10/12/21 1543)     iohexol, oxyCODONE, glucose, dextrose, glucagon (rDNA), dextrose, dextrose, dextrose 5% in lactated ringers, ondansetron, acetaminophen **OR** acetaminophen, perflutren lipid microspheres    Lab Data:  CBC:   Recent Labs     10/12/21  0418 10/13/21  0426 10/14/21  0430   WBC 18.0* 16.5* 16.2*   HGB 8.8* 10.2* 9.4*   HCT 26.6* 31.2* 28.6*   MCV 81.9 82.0 80.9    300 257     BMP:   Recent Labs     10/12/21  1601 10/13/21  0426 10/14/21  0430   * 128* 128*   K 5.5* 5.9* 4.8   CL 94* 97* 95*   CO2 23 23 25   PHOS 3.0 3.6 3.7   * 112* 109*   CREATININE 1.3* 1.2 1.3*     LIVER PROFILE:   Recent Labs     10/12/21  0418 10/13/21  0426 10/14/21  0430   AST 96* 182* 192*   * 171* 174*   BILITOT 0.7 0.8 0.9   ALKPHOS 80 70 78     PT/INR:   Recent Labs     10/12/21  0418 10/13/21  0426 10/14/21  0430   PROTIME 14.8* 14.4* 15.6*   INR 1.30* 1.26* 1.36*       Cultures:   10/11   BC x2 E faecalis, Анна negative, MARK pending               COVID NAAT, PCR neg               UC NGTD       Lexiscan myoview 7/8/21  IMAGING FINDINGS:   LVEDV:   123ml     (Normal is up to 100ml for women and 150ml for men)   LVEF:   57 %      (Normal is at least 62% for women and 53% for men)   TRANSIENT DILATATION RATIO:    0.8      (Normal is up to 1.3 for women and 1.2 for men)   LV WALL MOTION:   Unremarkable   SPECT PERFUSION IMAGES:   Fixed area of decreased activity in the lateral wall of the left ventricle, with no significant change between the stress and rest images.  No reversible perfusion defects to suggest myocardial ischemia          ECHO 10/11/21Summary   The left ventricle is normal in size with concentric hypertrophy. Left ventricular systolic function is normal with visually estimated LVEF of 55-60%. No obvious regional wall motion abnormalities. Elevated left ventricular filling pressure. The right ventricle is normal in size. Right ventricular systolic function is reduced. Severe left atrial enlargement. Severely calcified aortic valve with reduced excursion.  Aortic valve   gradients and visual appearance of the valve are most consistent with severe   low flow low gradient aortic stenosis (velocity=2.61m/s; MPG=14mmHg)   Trace aortic regurgitation. The mitral valve leaflets appear myxomatous with severe posterior directed mitral regurgitation. Inadequate tricuspid valve regurgitation to estimate systolic pulmonary artery pressure. No prior studies available for comparison. Per card Dr. Bhumika Hensley note:  -Rafi Allison with two-vessel PCI in 2008   -  LVEF 57% per MPI 7/8/2021.  No fixed or reversible perfusion defects.  -  TTE 5/16/2019 w/ severe MR and moderate AS. CT imaging 10/13/21  Impression   Large bilateral pleural effusions with large amount of bilateral atelectasis. Moderate anasarca.  Small amount of abdominal ascites.  No acute abdominal   abnormality identified.  Severely distended gallbladder.  No cholelithiasis   or finding of acute cholecystitis.           Assessment:  1. Atrial fibrillation:   -apparently new-onset and asymptomatic.    -high CHADs-vasc score=6   -continue metoprolol 25mg BID for HR control   -tele shows HR adequately controlled   -continue lovenox 40mg SQ BID for AC   -HR control and AC should be strategy; would not consider cardioversion or ablation    -debilitated and severe valve disease precludes more aggressive intervention   -unlikely to maintain NSR and would revert to afib eventually    2.  Mitral and aortic valve disease:   -known severe MR since 2019 which had increased from 2017 study   -I personally reviewed ECHO from 10/11/21   -eccentric, posteriorly directed, severe jet of MR   -note AV appears significantly calcified and restricted but pressure gradients and velocity  are low; results technically mild AS but may be falsely low and AS could be more severe    -given debilitated condition and multiple med problems she would NOT be too high risk  for surgical valve replacement and would treat medically as best possible   -recent CT imaging shows anasarca and pleural effusions likely due to CHF from  valvular disease +/- renal issues; would recommend diurese as best possible   -LFT's worsening and could be related to passive liver congestion from CHF    3. Elevated troponin:   -likely combination of ARF and infection   -EKG without ischemic ST changes    -low clinical suspicion for ACS   -would not pursue ischemia evaluation   -lexiscan myoview 7/8/21 showed no reversible ischemia; fixed lateral defect; EF 57%    4. Enterococcus Faecalis bacteremia:   -ID consulted   -source unknown but could be multiple sources (pulm, , abdominal, sacral decub)   -consider HIRAM only if no other source found and procedure would change antibiotic choice  or duration of treatment; she is higher risk for anesthesia    -CT abdomen/pelvis 10/13 showed large bilateral pleural effusions; small ascites;  moderate anasarca    5. S/P Left TKR and abdominal hysterectomy summer of 2021    6. CAD:    -hx MV CAD and 2V PCI in 2008   -There are no concerning symptoms for angina currently.     -continue baby asa, lipitor 40mg qd, and lopressor 25mg BID    Palliative care consult pending.  coming in today to discuss. Note she follows Dr. Alysia Lazaro for cardiology and last OV was July 2021.        Patient Active Problem List    Diagnosis Date Noted    Aortic valve stenosis     Elevated troponin     Type 2 diabetes mellitus with hyperosmolarity ([address] 10/11/2021    MARGY (acute kidney injury) ([address] 10/11/2021    HCAP (healthcare-associated pneumonia) 10/11/2021    New onset atrial fibrillation ([address] 10/11/2021    Sepsis ([address] 10/11/2021    Stage 3a chronic kidney disease ([address] 10/11/2021    Severe mitral regurgitation 10/11/2021    Dislodged Lerma catheter ([address]     Generalized abdominal mass 08/16/2021    CAD (coronary artery disease)     Hyperlipidemia     Hypertension     CKD (chronic kidney disease)     Pelvic mass     Status post total knee replacement, left 07/14/2021    Primary localized osteoarthritis of left knee 07/12/2021    Localized osteoarthritis of left knee 06/24/2021    Prepatellar bursitis of right knee 03/04/2020    Right TKR 08/30/2011       Nubia Hampton MD, MD 10/14/2021 7:45 AM

## 2021-10-15 NOTE — PROGRESS NOTES
Pt with increased secretions and cough. NP notified atropine gtt ordered/given. Suction hooked up but pt vocalized that she did not want to be suctioned. Pt was also able to vocalized that she was not in pain at this time. Attempt to reposition for pt comfort however pt just kept saying \"no no no\". Will attempt to re-position at a later time. Oral care also performed at this time.

## 2021-10-15 NOTE — PROGRESS NOTES
VM left on Spouse Дмитрий's phone stating RN needs call back as soon as possible @ [phone]. RN number given.   Will wait for call back

## 2021-10-15 NOTE — PLAN OF CARE
Problem: Falls - Risk of:  Goal: Will remain free from falls  Description: Will remain free from falls  Outcome: Ongoing     Problem: Pain:  Description: Pain management should include both nonpharmacologic and pharmacologic interventions.   Goal: Pain level will decrease  Description: Pain level will decrease  Outcome: Ongoing  Goal: Control of acute pain  Description: Control of acute pain  Outcome: Ongoing     Problem: Skin Integrity:  Goal: Will show no infection signs and symptoms  Description: Will show no infection signs and symptoms  Outcome: Ongoing  Goal: Absence of new skin breakdown  Description: Absence of new skin breakdown  Outcome: Ongoing

## 2021-10-15 NOTE — PROGRESS NOTES
Family gone.  home of choice contacted and pt info given to them. All pt lines removed. Pt cleaned and placed in body bag with tags attached. Transport aware to come take body to [facility].

## 2021-10-18 LAB
BLOOD CULTURE, ROUTINE: NORMAL
CULTURE, BLOOD 2: NORMAL

## 2021-10-18 NOTE — DISCHARGE SUMMARY
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 ([facility] [I50.9, I38]     Aortic valve stenosis [I35.0]     Elevated troponin [R77.8]     Type 2 diabetes mellitus with hyperosmolarity (HCC) [E11.00]     MARGY (acute kidney injury) [facility] [N17.9]     New onset atrial fibrillation (HCC) [I48.91]     Sepsis ([facility] [A41.9]     Stage 3a chronic kidney disease ([facility] [N18.31]     Severe mitral regurgitation [I34.0]     CAD (coronary artery disease) [I25.10]        Date of Death:10/15/2021  Time of Death: 0700am    Immediate Cause of Death: Sepsis/Cardiopulmonary Arrest  Underlying Conditions: CAD, DM, Enterococcal Bacteremia   Other Contributing Conditions: ARF/CKD/unstageable decubitus coccyx/a fib/CHF    Hospital Course: Venancio Cortez is a 81 y.o. female. She was sent to the ER from [facility] for reported dyspnea. Her appetite has been poor for at least a month. She has been eating and drinking but not much. She denies rigors, sweats, nausea, and vomiting. Also denies any choking or gagging episodes while eating. She is very weak. She has been participating in rehab to the extent possible, but still not even able to stand. She has developed a left knee contracture. She has been bed or chair bound. She denies respiratory symptoms now. She states she had a 2-week cough for a while in early September but it resolved weeks ago. She denies abdominal pain and  complaints. Her recent history has been complex. She was hospitalized - for left knee total arthroplasty. A large 20cm pelvic mass was discovered during preoperative evaluation.   Evaluation of the mass was delayed in favor of completing the arthroplasty first.  She was discharged to a SNF. Rehab did not progress well. She developed a left knee contracture. She then presented 21 because her velazquez catheter was thought to have become dislodged. It was suspected at the time that the mass effect of the tumor was causing urinary retention. A new velazquez was placed. She was hospitalized  because at the time her  was unable to take care of her at home and unable to get her to needed appointments. While hospitalized she was evaluated by Ob/Gyn. Unfortunately no Gyn/Onc consultant was available so she was transferred to [facility] to be evaluated by Dr. Wanda Hendricks. She underwent SUKHI/BSO 21 at [facility].  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 Patient dx with Sepsis, UTI, Enterococcal bacteremia. Patient desire was to focus on comfort care and end of life care. Hospice consulted. Patient .          Consults:  IP CONSULT TO HOSPITALIST  IP CONSULT TO PALLIATIVE CARE  IP CONSULT TO CARDIOLOGY  IP CONSULT TO INFECTIOUS DISEASES  IP CONSULT TO PHARMACY  IP CONSULT TO NEPHROLOGY  IP CONSULT TO HOSPICE    Signed:  LINDSAY Daniel - CNP MD   10/18/2021    Thank you Nae Hall MD for the opportunity to be involved in this patient's care. If you have any questions or concerns please feel free to contact me at [phone].

## 2024-07-30 NOTE — PROGRESS NOTES
4 Eyes Skin Assessment      The patient is being assess for   Admission    I agree that 2 RN's have performed a thorough Head to Toe Skin Assessment on the patient. ALL assessment sites listed below have been assessed. Areas assessed for pressure by both nurses:   [x]   Head, Face, and Ears   [x]   Shoulders, Back, and Chest, Abdomen  [x]   Arms, Elbows, and Hands   [x]   Coccyx, Sacrum, and Ischium  [x]   Legs, Feet, and Heels        Skin Assessed Under all Medical Devices by both nurses:  n/a              All Mepilex Borders were peeled back and area peeked at by both nurses:  No: N/A  Please list where Mepilex Borders are located:  N/A             **SHARE this note so that the co-signing nurse is able to place an eSignature**    Co-signer eSignature: Electronically signed by [employer] on 8/16/21 at 7:28 PM EDT    Does the Patient have Skin Breakdown related to pressure? Yes LDA WOUND CARE was Initiated documentation include the Mariana-wound, Wound Assessment, Measurements, Dressing Treatment, Drainage, and Color\",     Pt has 2mm stage II pressure wound and redness on sacrum. Mepilex applied.      (Insert Photo hereN/A)         Fabian Prevention initiated:  Yes   Wound Care Orders initiated:  NA      WOC nurse consulted for Pressure Injury (Stage 3,4, Unstageable, DTI, NWPT, Complex wounds)and New or Established Ostomies:  NA      Primary Nurse eSignature: Electronically signed by Amber Ornelas RN on 8/16/21 at 6:57 PM EDT Not Applicable

## (undated) DEVICE — GLOVE ORANGE PI 7 1/2   MSG9075

## (undated) DEVICE — Device

## (undated) DEVICE — NEEDLE SPNL 22GA L3.5IN BLK HUB S STL REG WALL FIT STYL W/

## (undated) DEVICE — PREVENA INCISION MANAGEMENT SYSTEM- PEEL & PLACE DRESSING: Brand: PREVENA™ PEEL & PLACE™

## (undated) DEVICE — HOOD, PEEL-AWAY: Brand: FLYTE

## (undated) DEVICE — BIPOLAR SEALER 23-112-1 AQM 6.0: Brand: AQUAMANTYS ®

## (undated) DEVICE — 2108 SERIES SAGITTAL BLADE, NO OFFSET  (12.4 X 1.19 X 82.1MM)

## (undated) DEVICE — SUTURE ETHBND EXCEL SZ 2 L30IN NONABSORBABLE GRN L40MM V-37 MX69G

## (undated) DEVICE — SUTURE VCRL + SZ 2-0 L18IN ABSRB UD CT1 L36MM 1/2 CIR VCP839D

## (undated) DEVICE — SUTURE STRATAFIX SPRL SZ 1 L14IN ABSRB VLT L48CM CTX 1/2 SXPD2B405

## (undated) DEVICE — 3M™ COBAN™ SELF-ADHERENT WRAP, 1586S, STERILE, 6 IN X 5 YD (15 CM X 4,5 M), 12 ROLLS/CASE: Brand: 3M™ COBAN™

## (undated) DEVICE — SUTURE MCRYL SZ 3-0 L27IN ABSRB UD L19MM PS-2 3/8 CIR PRIM Y427H

## (undated) DEVICE — BOWL AND CEMENT CARTRIDGE WITH BREAKAWAY FEMORAL NOZZLE AND MEDIUM PRESSURIZER: Brand: ACM

## (undated) DEVICE — ZIMMER® A.T.S. CYCLINDRICAL TOURNIQUET CUFF, SINGLE PORT, SINGLE BLADDER 30 IN. 76 CM)

## (undated) DEVICE — SPONGE LAP W18XL18IN WHT COT 4 PLY FLD STRUNG RADPQ DISP ST

## (undated) DEVICE — PADDING BNDG UNDERCAST 3.3 YDX3.9 IN N ABSORBENT WHT ARTFLX

## (undated) DEVICE — MYKNEE FEMUR BONE MODEL LEFT: Brand: MY KNEE BONE MODELS

## (undated) DEVICE — HANDPIECE SET WITH HIGH FLOW TIP AND SUCTION TUBE: Brand: INTERPULSE

## (undated) DEVICE — BONE PREPARATION KIT: Brand: BIOPREP

## (undated) DEVICE — GLOVE SURG SZ 8 L12IN FNGR THK79MIL GRN LTX FREE

## (undated) DEVICE — SYSTEM SKIN CLSR 22CM DERMBND PRINEO

## (undated) DEVICE — SPLINT KNEE UNIV FOR LESS THAN 36IN L20IN FOAM LAM E CNTCT

## (undated) DEVICE — MYKNEE TIBIAL BONE MODEL LEFT: Brand: MY KNEE BONE MODELS

## (undated) DEVICE — SOLUTION IRRIG 2000ML 0.9% SOD CHL USP UROMATIC PLAS CONT

## (undated) DEVICE — COVER,TABLE,HEAVY DUTY,50"X90",STRL: Brand: MEDLINE

## (undated) DEVICE — PENCIL SMK EVAC ALL IN 1 DSGN ENH VISIBILITY IMPROVED AIR